# Patient Record
Sex: MALE | Race: BLACK OR AFRICAN AMERICAN | NOT HISPANIC OR LATINO
[De-identification: names, ages, dates, MRNs, and addresses within clinical notes are randomized per-mention and may not be internally consistent; named-entity substitution may affect disease eponyms.]

---

## 2021-08-16 DIAGNOSIS — Z01.818 ENCOUNTER FOR OTHER PREPROCEDURAL EXAMINATION: ICD-10-CM

## 2021-08-16 PROBLEM — Z00.00 ENCOUNTER FOR PREVENTIVE HEALTH EXAMINATION: Status: ACTIVE | Noted: 2021-08-16

## 2021-08-17 ENCOUNTER — APPOINTMENT (OUTPATIENT)
Dept: DISASTER EMERGENCY | Facility: CLINIC | Age: 66
End: 2021-08-17

## 2021-08-18 ENCOUNTER — NON-APPOINTMENT (OUTPATIENT)
Age: 66
End: 2021-08-18

## 2021-08-18 LAB — SARS-COV-2 N GENE NPH QL NAA+PROBE: NOT DETECTED

## 2021-08-20 ENCOUNTER — INPATIENT (INPATIENT)
Facility: HOSPITAL | Age: 66
LOS: 16 days | Discharge: HOME HEALTH | DRG: 233 | End: 2021-09-06
Attending: THORACIC SURGERY (CARDIOTHORACIC VASCULAR SURGERY) | Admitting: THORACIC SURGERY (CARDIOTHORACIC VASCULAR SURGERY)
Payer: MEDICARE

## 2021-08-20 VITALS
DIASTOLIC BLOOD PRESSURE: 65 MMHG | HEART RATE: 70 BPM | RESPIRATION RATE: 18 BRPM | HEIGHT: 66 IN | WEIGHT: 190.04 LBS | OXYGEN SATURATION: 100 % | TEMPERATURE: 99 F | SYSTOLIC BLOOD PRESSURE: 144 MMHG

## 2021-08-20 DIAGNOSIS — Z98.890 OTHER SPECIFIED POSTPROCEDURAL STATES: Chronic | ICD-10-CM

## 2021-08-20 DIAGNOSIS — I25.10 ATHEROSCLEROTIC HEART DISEASE OF NATIVE CORONARY ARTERY WITHOUT ANGINA PECTORIS: ICD-10-CM

## 2021-08-20 DIAGNOSIS — E78.5 HYPERLIPIDEMIA, UNSPECIFIED: ICD-10-CM

## 2021-08-20 DIAGNOSIS — E11.9 TYPE 2 DIABETES MELLITUS WITHOUT COMPLICATIONS: ICD-10-CM

## 2021-08-20 DIAGNOSIS — I10 ESSENTIAL (PRIMARY) HYPERTENSION: ICD-10-CM

## 2021-08-20 DIAGNOSIS — I25.118 ATHEROSCLEROTIC HEART DISEASE OF NATIVE CORONARY ARTERY WITH OTHER FORMS OF ANGINA PECTORIS: ICD-10-CM

## 2021-08-20 LAB
A1C WITH ESTIMATED AVERAGE GLUCOSE RESULT: 7.2 % — HIGH (ref 4–5.6)
ALBUMIN SERPL ELPH-MCNC: 3.7 G/DL — SIGNIFICANT CHANGE UP (ref 3.3–5)
ALP SERPL-CCNC: 60 U/L — SIGNIFICANT CHANGE UP (ref 40–120)
ALT FLD-CCNC: 11 U/L — SIGNIFICANT CHANGE UP (ref 10–45)
ANION GAP SERPL CALC-SCNC: 12 MMOL/L — SIGNIFICANT CHANGE UP (ref 5–17)
APPEARANCE UR: CLEAR — SIGNIFICANT CHANGE UP
APTT BLD: 30.8 SEC — SIGNIFICANT CHANGE UP (ref 27.5–35.5)
AST SERPL-CCNC: 15 U/L — SIGNIFICANT CHANGE UP (ref 10–40)
BILIRUB DIRECT SERPL-MCNC: 0.2 MG/DL — SIGNIFICANT CHANGE UP (ref 0–0.2)
BILIRUB INDIRECT FLD-MCNC: 0.4 MG/DL — SIGNIFICANT CHANGE UP (ref 0.2–1)
BILIRUB SERPL-MCNC: 0.6 MG/DL — SIGNIFICANT CHANGE UP (ref 0.2–1.2)
BILIRUB UR-MCNC: NEGATIVE — SIGNIFICANT CHANGE UP
BLD GP AB SCN SERPL QL: NEGATIVE — SIGNIFICANT CHANGE UP
BUN SERPL-MCNC: 25 MG/DL — HIGH (ref 7–23)
CALCIUM SERPL-MCNC: 9.6 MG/DL — SIGNIFICANT CHANGE UP (ref 8.4–10.5)
CHLORIDE SERPL-SCNC: 106 MMOL/L — SIGNIFICANT CHANGE UP (ref 96–108)
CHOLEST SERPL-MCNC: 136 MG/DL — SIGNIFICANT CHANGE UP
CO2 SERPL-SCNC: 21 MMOL/L — LOW (ref 22–31)
COLOR SPEC: SIGNIFICANT CHANGE UP
CREAT SERPL-MCNC: 1.06 MG/DL — SIGNIFICANT CHANGE UP (ref 0.5–1.3)
DIFF PNL FLD: NEGATIVE — SIGNIFICANT CHANGE UP
ESTIMATED AVERAGE GLUCOSE: 160 MG/DL — HIGH (ref 68–114)
FIBRINOGEN PPP-MCNC: 398 MG/DL — SIGNIFICANT CHANGE UP (ref 290–520)
GLUCOSE BLDC GLUCOMTR-MCNC: 155 MG/DL — HIGH (ref 70–99)
GLUCOSE BLDC GLUCOMTR-MCNC: 172 MG/DL — HIGH (ref 70–99)
GLUCOSE BLDC GLUCOMTR-MCNC: 177 MG/DL — HIGH (ref 70–99)
GLUCOSE BLDC GLUCOMTR-MCNC: 257 MG/DL — HIGH (ref 70–99)
GLUCOSE SERPL-MCNC: 168 MG/DL — HIGH (ref 70–99)
GLUCOSE UR QL: ABNORMAL
HCT VFR BLD CALC: 39.7 % — SIGNIFICANT CHANGE UP (ref 39–50)
HDLC SERPL-MCNC: 35 MG/DL — LOW
HGB BLD-MCNC: 13.1 G/DL — SIGNIFICANT CHANGE UP (ref 13–17)
INR BLD: 1.27 RATIO — HIGH (ref 0.88–1.16)
KETONES UR-MCNC: NEGATIVE — SIGNIFICANT CHANGE UP
LEUKOCYTE ESTERASE UR-ACNC: NEGATIVE — SIGNIFICANT CHANGE UP
LIPID PNL WITH DIRECT LDL SERPL: 91 MG/DL — SIGNIFICANT CHANGE UP
MCHC RBC-ENTMCNC: 32 PG — SIGNIFICANT CHANGE UP (ref 27–34)
MCHC RBC-ENTMCNC: 33 GM/DL — SIGNIFICANT CHANGE UP (ref 32–36)
MCV RBC AUTO: 97.1 FL — SIGNIFICANT CHANGE UP (ref 80–100)
NITRITE UR-MCNC: NEGATIVE — SIGNIFICANT CHANGE UP
NON HDL CHOLESTEROL: 101 MG/DL — SIGNIFICANT CHANGE UP
NRBC # BLD: 0 /100 WBCS — SIGNIFICANT CHANGE UP (ref 0–0)
NT-PROBNP SERPL-SCNC: 175 PG/ML — SIGNIFICANT CHANGE UP (ref 0–300)
PH UR: 6 — SIGNIFICANT CHANGE UP (ref 5–8)
PLATELET # BLD AUTO: 275 K/UL — SIGNIFICANT CHANGE UP (ref 150–400)
POTASSIUM SERPL-MCNC: 4.5 MMOL/L — SIGNIFICANT CHANGE UP (ref 3.5–5.3)
POTASSIUM SERPL-SCNC: 4.5 MMOL/L — SIGNIFICANT CHANGE UP (ref 3.5–5.3)
PROT SERPL-MCNC: 6.4 G/DL — SIGNIFICANT CHANGE UP (ref 6–8.3)
PROT UR-MCNC: NEGATIVE — SIGNIFICANT CHANGE UP
PROTHROM AB SERPL-ACNC: 15.1 SEC — HIGH (ref 10.6–13.6)
RBC # BLD: 4.09 M/UL — LOW (ref 4.2–5.8)
RBC # FLD: 12.5 % — SIGNIFICANT CHANGE UP (ref 10.3–14.5)
RH IG SCN BLD-IMP: POSITIVE — SIGNIFICANT CHANGE UP
SARS-COV-2 RNA SPEC QL NAA+PROBE: SIGNIFICANT CHANGE UP
SODIUM SERPL-SCNC: 139 MMOL/L — SIGNIFICANT CHANGE UP (ref 135–145)
SP GR SPEC: 1.04 — HIGH (ref 1.01–1.02)
T3 SERPL-MCNC: 71 NG/DL — LOW (ref 80–200)
T4 AB SER-ACNC: 5.9 UG/DL — SIGNIFICANT CHANGE UP (ref 4.6–12)
TRIGL SERPL-MCNC: 50 MG/DL — SIGNIFICANT CHANGE UP
TSH SERPL-MCNC: 1.09 UIU/ML — SIGNIFICANT CHANGE UP (ref 0.27–4.2)
UROBILINOGEN FLD QL: NEGATIVE — SIGNIFICANT CHANGE UP
WBC # BLD: 4.5 K/UL — SIGNIFICANT CHANGE UP (ref 3.8–10.5)
WBC # FLD AUTO: 4.5 K/UL — SIGNIFICANT CHANGE UP (ref 3.8–10.5)

## 2021-08-20 PROCEDURE — 71045 X-RAY EXAM CHEST 1 VIEW: CPT | Mod: 26

## 2021-08-20 PROCEDURE — 93306 TTE W/DOPPLER COMPLETE: CPT | Mod: 26

## 2021-08-20 PROCEDURE — 93010 ELECTROCARDIOGRAM REPORT: CPT

## 2021-08-20 RX ORDER — GABAPENTIN 400 MG/1
300 CAPSULE ORAL THREE TIMES A DAY
Refills: 0 | Status: DISCONTINUED | OUTPATIENT
Start: 2021-08-20 | End: 2021-08-27

## 2021-08-20 RX ORDER — FINASTERIDE 5 MG/1
5 TABLET, FILM COATED ORAL DAILY
Refills: 0 | Status: DISCONTINUED | OUTPATIENT
Start: 2021-08-20 | End: 2021-08-27

## 2021-08-20 RX ORDER — SODIUM CHLORIDE 9 MG/ML
1000 INJECTION, SOLUTION INTRAVENOUS
Refills: 0 | Status: DISCONTINUED | OUTPATIENT
Start: 2021-08-20 | End: 2021-08-23

## 2021-08-20 RX ORDER — DEXTROSE 50 % IN WATER 50 %
15 SYRINGE (ML) INTRAVENOUS ONCE
Refills: 0 | Status: DISCONTINUED | OUTPATIENT
Start: 2021-08-20 | End: 2021-08-27

## 2021-08-20 RX ORDER — GEMFIBROZIL 600 MG
600 TABLET ORAL
Refills: 0 | Status: DISCONTINUED | OUTPATIENT
Start: 2021-08-20 | End: 2021-08-27

## 2021-08-20 RX ORDER — GLUCAGON INJECTION, SOLUTION 0.5 MG/.1ML
1 INJECTION, SOLUTION SUBCUTANEOUS ONCE
Refills: 0 | Status: DISCONTINUED | OUTPATIENT
Start: 2021-08-20 | End: 2021-08-23

## 2021-08-20 RX ORDER — DEXTROSE 50 % IN WATER 50 %
12.5 SYRINGE (ML) INTRAVENOUS ONCE
Refills: 0 | Status: DISCONTINUED | OUTPATIENT
Start: 2021-08-20 | End: 2021-08-23

## 2021-08-20 RX ORDER — TAMSULOSIN HYDROCHLORIDE 0.4 MG/1
0.4 CAPSULE ORAL AT BEDTIME
Refills: 0 | Status: DISCONTINUED | OUTPATIENT
Start: 2021-08-20 | End: 2021-08-27

## 2021-08-20 RX ORDER — ASPIRIN/CALCIUM CARB/MAGNESIUM 324 MG
81 TABLET ORAL DAILY
Refills: 0 | Status: DISCONTINUED | OUTPATIENT
Start: 2021-08-20 | End: 2021-08-27

## 2021-08-20 RX ORDER — INSULIN LISPRO 100/ML
VIAL (ML) SUBCUTANEOUS
Refills: 0 | Status: DISCONTINUED | OUTPATIENT
Start: 2021-08-20 | End: 2021-08-22

## 2021-08-20 RX ORDER — INSULIN LISPRO 100/ML
VIAL (ML) SUBCUTANEOUS AT BEDTIME
Refills: 0 | Status: DISCONTINUED | OUTPATIENT
Start: 2021-08-20 | End: 2021-08-22

## 2021-08-20 RX ORDER — DEXTROSE 50 % IN WATER 50 %
25 SYRINGE (ML) INTRAVENOUS ONCE
Refills: 0 | Status: DISCONTINUED | OUTPATIENT
Start: 2021-08-20 | End: 2021-08-23

## 2021-08-20 RX ORDER — DEXTROSE 50 % IN WATER 50 %
25 SYRINGE (ML) INTRAVENOUS ONCE
Refills: 0 | Status: DISCONTINUED | OUTPATIENT
Start: 2021-08-20 | End: 2021-08-22

## 2021-08-20 RX ORDER — METOPROLOL TARTRATE 50 MG
25 TABLET ORAL
Refills: 0 | Status: DISCONTINUED | OUTPATIENT
Start: 2021-08-20 | End: 2021-08-27

## 2021-08-20 RX ADMIN — Medication 1: at 17:10

## 2021-08-20 RX ADMIN — Medication 600 MILLIGRAM(S): at 22:08

## 2021-08-20 RX ADMIN — TAMSULOSIN HYDROCHLORIDE 0.4 MILLIGRAM(S): 0.4 CAPSULE ORAL at 22:08

## 2021-08-20 RX ADMIN — FINASTERIDE 5 MILLIGRAM(S): 5 TABLET, FILM COATED ORAL at 17:13

## 2021-08-20 RX ADMIN — Medication 25 MILLIGRAM(S): at 17:13

## 2021-08-20 RX ADMIN — GABAPENTIN 300 MILLIGRAM(S): 400 CAPSULE ORAL at 22:08

## 2021-08-20 NOTE — H&P CARDIOLOGY - NSICDXFAMILYHX_GEN_ALL_CORE_FT
FAMILY HISTORY:  Father  Still living? No  MI (myocardial infarction), Age at diagnosis: Age Unknown

## 2021-08-20 NOTE — CONSULT NOTE ADULT - SUBJECTIVE AND OBJECTIVE BOX
History of Present Illness:    66 yo Male (denies implanted devices) with significant PMHx: of T2DM (last HgbA1c 8.1, managed by PCP, complicated by peripheral neuropathy), HLD, HTN, BPH presents for RHC/LHC. Patient c/o chest discomfort with exertion that has been becoming progressively worse and resolves with rest associated with SOB. Chest pain resolves with rest. Patient recently went to [facility] ER on 7/29 and patient states results were normal. Patient seen and evaluated by Dr. Celestin and presents for further evaluation and cardiac Cath.  Cath finding revealed: proximal left main 50 %, proximal LAD 95 % stenosis, proximal circumflex: 95 % and proximal RCA: 95 % stenosis. CTS consult called to evaluate patient for cardiac surgery with Dr. Deep Valerio.     Past Medical History  T2DM (type 2 diabetes mellitus)  c/b peripheral neuropathy    HTN (hypertension)    HLD (hyperlipidemia)    BPH (benign prostatic hyperplasia)    Past Surgical History  H/O shoulder surgery  right    H/O arthroscopy of knee    S/P arthroscopy of right knee    MEDICATIONS  (STANDING):  aspirin enteric coated 81 milliGRAM(s) Oral daily  dextrose 40% Gel 15 Gram(s) Oral once  dextrose 5%. 1000 milliLiter(s) (100 mL/Hr) IV Continuous <Continuous>  dextrose 5%. 1000 milliLiter(s) (50 mL/Hr) IV Continuous <Continuous>  dextrose 50% Injectable 25 Gram(s) IV Push once  dextrose 50% Injectable 25 Gram(s) IV Push once  dextrose 50% Injectable 12.5 Gram(s) IV Push once  enalapril 10 milliGRAM(s) Oral daily  finasteride 5 milliGRAM(s) Oral daily  gabapentin 300 milliGRAM(s) Oral three times a day  gemfibrozil 600 milliGRAM(s) Oral two times a day  glucagon  Injectable 1 milliGRAM(s) IntraMuscular once  insulin lispro (ADMELOG) corrective regimen sliding scale   SubCutaneous at bedtime  insulin lispro (ADMELOG) corrective regimen sliding scale   SubCutaneous three times a day before meals  tamsulosin 0.4 milliGRAM(s) Oral at bedtime    MEDICATIONS  (PRN):    Antiplatelet therapy: N/A                          Last dose/amt:    Allergies: sulfa drugs (Hives)    SOCIAL HISTORY:  Smoker: [ ] Yes  [ ] No        PACK YEARS:                         WHEN QUIT?  ETOH use: [ ] Yes  [ ] No              FREQUENCY / QUANTITY:  Ilicit Drug use:  [ ] Yes  [ ] No  Occupation:  Live with:  Assist device use:    Relevant Family History  FAMILY HISTORY:  MI (myocardial infarction) (Father)    Review of Systems  GENERAL:  Fevers[] chills[] sweats[] fatigue[] weight loss[] weight gain []                                        NEURO:  parathesias[] seizures []  syncope []  confusion []                                                                                  EYES: glasses[]  blurry vision[]  discharge[] pain[] glaucoma []                                                                            ENMT:  difficulty hearing []  vertigo[]  dysphagia[] epistaxis[] recent dental work []                                      CV:  chest pain[] palpitations[] HOLLEY [] diaphoresis [] edema[]                                                                                             RESPIRATORY:  wheezing[] SOB[] cough [] sputum[] hemoptysis[]                                                                    GI:  nausea[]  vomiting []  diarrhea[] constipation [] melena []                                                                        : hematuria[ ]  dysuria[ ] urgency[] incontinence[]                                                                                              MUSCULOSKELETAL  arthritis[ ]  joint swelling [ ] muscle weakness [ ]                                                                  SKIN/BREAST:  rash[ ] itching [ ]  hair loss[ ] masses[ ]                                                                                                PSYCH:  dementia [ ] depression [ ] anxiety[ ]                                                                                                                  HEME/LYMPH:  bruises easily[ ] enlarged lymph nodes[ ] tender lymph nodes[ ]                                                 ENDOCRINE:  cold intolerance[ ] heat intolerance[ ] polydipsia[ ]                                                                              PHYSICAL EXAM  Vital Signs Last 24 Hrs  T(C): 37.1 (20 Aug 2021 08:07), Max: 37.1 (20 Aug 2021 08:07)  T(F): 98.7 (20 Aug 2021 08:07), Max: 98.7 (20 Aug 2021 08:07)  HR: 68 (20 Aug 2021 12:40) (60 - 70)  BP: 113/63 (20 Aug 2021 12:40) (104/58 - 144/65)  BP(mean): --  RR: 19 (20 Aug 2021 12:40) (14 - 19)  SpO2: 98% (20 Aug 2021 12:40) (97% - 100%)    General: Well nourished, well developed, no acute distress.                                                         Neuro: Normal exam oriented to person/place & time with no focal motor or sensory  deficits.                    Eyes: Normal exam of conjunctiva & lids, pupils equally reactive.   ENT: Normal exam of nasal/oral mucosa with absence of cyanosis.   Neck: Normal exam of jugular veins, trachea & thyroid.   Chest: Normal lung exam with good air movement absence of wheezes, rales, or rhonchi:                                                                          CV:  Auscultation: normal [ ] S3[ ] S4[ ] Irregular [ ] Rub[ ] Clicks[ ]  Murmurs none:[ ]systolic [ ]  diastolic [ ] holosystolic [ ]  Carotids: No Bruits[ ] Other____________ Abdominal Aorta: normal [ ] nonpalpable[ ]                                                                         GI: Normal exam of abdomen, liver & spleen with no noted masses or tenderness.  (+) BS X 4 Quadrants, Nontender / Non Distended.                                                                                             Extremities: Normal no evidence of cyanosis or deformity Edema: none[ ]trace[ ]1+[ ]2+[ ]3+[ ]4+[ ]  Lower Extremity Pulses: Right[ ] Left[ ]Varicosities[ ]  SKIN : Normal exam to inspection & palpation.                                                           LABS:                        13.1   4.50  )-----------( 275      ( 20 Aug 2021 08:50 )             39.7     08-20    139  |  106  |  25<H>  ----------------------------<  168<H>  4.5   |  21<L>  |  1.06    Ca    9.6      20 Aug 2021 08:50    TPro  6.4  /  Alb  3.7  /  TBili  0.6  /  DBili  0.2  /  AST  15  /  ALT  11  /  AlkPhos  60  08-20    PT/INR - ( 20 Aug 2021 11:21 )   PT: 15.1 sec;   INR: 1.27 ratio         PTT - ( 20 Aug 2021 11:21 )  PTT:30.8 sec    Cardiac Cath: < from: Cardiac Cath Lab (08.20.21 @ 09:50) EF estimated was 60 %.  CORONARY VESSELS: The coronary circulation is right dominant.  LM: --  Proximal left main: There was a 50 % stenosis.  LAD:   --  Proximal LAD: There was a 95 % stenosis.  CX:   --  Proximal circumflex: There was a 95 % stenosis.  RCA:   --  Proximal RCA: There was a 95 % stenosis.               History of Present Illness:    64 yo Male (denies implanted devices) with significant PMHx: of T2DM (last HgbA1c 8.1, managed by PCP, complicated by peripheral neuropathy), HLD, HTN, BPH presents for RHC/LHC. Patient c/o chest discomfort with exertion that has been becoming progressively worse and resolves with rest associated with SOB. Chest pain resolves with rest. Patient recently went to [facility] ER on 7/29 and patient states results were normal. Patient seen and evaluated by Dr. Celestin and presents for further evaluation and cardiac Cath.  Cath finding revealed: proximal left main 50 %, proximal LAD 95 % stenosis, proximal circumflex: 95 % and proximal RCA: 95 % stenosis. CTS consult called to evaluate patient for cardiac surgery with Dr. Deep Valerio.     Past Medical History    T2DM (type 2 diabetes mellitus)  c/b peripheral neuropathy    HTN (hypertension)    HLD (hyperlipidemia)    BPH (benign prostatic hyperplasia)    Past Surgical History  H/O shoulder surgery  right    H/O arthroscopy of knee    S/P arthroscopy of right knee    MEDICATIONS  (STANDING):  aspirin enteric coated 81 milliGRAM(s) Oral daily  dextrose 40% Gel 15 Gram(s) Oral once  dextrose 5%. 1000 milliLiter(s) (100 mL/Hr) IV Continuous <Continuous>  dextrose 5%. 1000 milliLiter(s) (50 mL/Hr) IV Continuous <Continuous>  dextrose 50% Injectable 25 Gram(s) IV Push once  dextrose 50% Injectable 25 Gram(s) IV Push once  dextrose 50% Injectable 12.5 Gram(s) IV Push once  enalapril 10 milliGRAM(s) Oral daily  finasteride 5 milliGRAM(s) Oral daily  gabapentin 300 milliGRAM(s) Oral three times a day  gemfibrozil 600 milliGRAM(s) Oral two times a day  glucagon  Injectable 1 milliGRAM(s) IntraMuscular once  insulin lispro (ADMELOG) corrective regimen sliding scale  SubCutaneous at bedtime  insulin lispro (ADMELOG) corrective regimen sliding scale  SubCutaneous three times a day before meals  tamsulosin 0.4 milliGRAM(s) Oral at bedtime    MEDICATIONS  (PRN):    Antiplatelet therapy: N/A                          Last dose/amt:    Allergies: sulfa drugs (Hives)    SOCIAL HISTORY:  Smoker: [ ] Yes  [X ] No        PACK YEARS:                         WHEN QUIT?  ETOH use: [ ] Yes  [X] No              FREQUENCY / QUANTITY: Occasional may have a drink every 6 months   Ilicit Drug use:  [ ] Yes  [X ] No  Occupation: Retired Social work   Live with: Spouse   Assist device use: Denies     Relevant Family History  FAMILY HISTORY:  MI (myocardial infarction) (Father)    Review of Systems  GENERAL:  Fevers[] chills[] sweats[] fatigue[] weight loss[] weight gain []                                        NEURO:  parathesias[] seizures []  syncope []  confusion []                                                                                  EYES: glasses[]  blurry vision[]  discharge[] pain[] glaucoma []                                                                            ENMT:  difficulty hearing []  vertigo[]  dysphagia[] epistaxis[] recent dental work []                                      CV:  chest pain[X] palpitations[] HOLLEY [X] diaphoresis [] edema[]                                                                                             RESPIRATORY:  wheezing[] SOB[] cough [] sputum[] hemoptysis[]                                                                    GI:  nausea[]  vomiting []  diarrhea[] constipation [] melena []                                                                        : hematuria[ ]  dysuria[ ] urgency[] incontinence[]                                                                                              MUSCULOSKELETAL  arthritis[ ]  joint swelling [ ] muscle weakness [ ]                                                                  SKIN/BREAST:  rash[ ] itching [ ]  hair loss[ ] masses[ ]                                                                                                PSYCH:  dementia [ ] depression [ ] anxiety[ ]                                                                                                                  HEME/LYMPH:  bruises easily[ ] enlarged lymph nodes[ ] tender lymph nodes[ ]                                                 ENDOCRINE:  cold intolerance[ ] heat intolerance[ ] polydipsia[ ]                                                                              PHYSICAL EXAM  Vital Signs Last 24 Hrs  T(C): 37.1 (20 Aug 2021 08:07), Max: 37.1 (20 Aug 2021 08:07)  T(F): 98.7 (20 Aug 2021 08:07), Max: 98.7 (20 Aug 2021 08:07)  HR: 68 (20 Aug 2021 12:40) (60 - 70)  BP: 113/63 (20 Aug 2021 12:40) (104/58 - 144/65)  BP(mean): --  RR: 19 (20 Aug 2021 12:40) (14 - 19)  SpO2: 98% (20 Aug 2021 12:40) (97% - 100%)    General: Well nourished, well developed, no acute distress.                                                         Neuro: Normal exam oriented to person/place & time with no focal motor or sensory  deficits.                    Eyes: Normal exam of conjunctiva & lids, pupils equally reactive.   ENT: Normal exam of nasal/oral mucosa with absence of cyanosis.   Neck: Normal exam of jugular veins, trachea & thyroid.   Chest: Normal lung exam with good air movement absence of wheezes, rales, or rhonchi:                                                                          CV:  Auscultation: normal [X] S3[ ] S4[ ] Irregular [ ] Rub[ ] Clicks[ ]  Murmurs none:[X ]systolic [ ]  diastolic [ ] holosystolic [ ]  Carotids: No Bruits[+2 ] Other____________ Abdominal Aorta: normal [X ] nonpalpable[X ]                                                                         GI: Normal exam of abdomen, liver & spleen with no noted masses or tenderness.  (+) BS X 4 Quadrants, Nontender / Non Distended.                                                                                             Extremities: Normal no evidence of cyanosis or deformity Edema: none[X ]trace[ ]1+[ ]2+[ ]3+[ ]4+[ ]  Lower Extremity Pulses: Right[+2] Left[+2 ]Varicosities[+ ]  SKIN : Normal exam to inspection & palpation.                                                           LABS:                        13.1   4.50  )-----------( 275      ( 20 Aug 2021 08:50 )             39.7     08-20    139  |  106  |  25<H>  ----------------------------<  168<H>  4.5   |  21<L>  |  1.06    Ca    9.6      20 Aug 2021 08:50    TPro  6.4  /  Alb  3.7  /  TBili  0.6  /  DBili  0.2  /  AST  15  /  ALT  11  /  AlkPhos  60  08-20    PT/INR - ( 20 Aug 2021 11:21 )   PT: 15.1 sec;   INR: 1.27 ratio         PTT - ( 20 Aug 2021 11:21 )  PTT:30.8 sec    Cardiac Cath: < from: Cardiac Cath Lab (08.20.21 @ 09:50) EF estimated was 60 %.  CORONARY VESSELS: The coronary circulation is right dominant.  LM: --  Proximal left main: There was a 50 % stenosis.  LAD:   --  Proximal LAD: There was a 95 % stenosis.  CX:   --  Proximal circumflex: There was a 95 % stenosis.  RCA:   --  Proximal RCA: There was a 95 % stenosis.

## 2021-08-20 NOTE — CONSULT NOTE ADULT - ASSESSMENT
66 yo Male (denies implanted devices) with significant PMHx: of T2DM (last HgbA1c 8.1, managed by PCP, complicated by peripheral neuropathy), HLD, HTN, BPH presents for RHC/LHC today. S/P Cath finding revealed: proximal left main 50 %, proximal LAD 95 % stenosis, proximal circumflex: 95 % and proximal RCA: 95 % stenosis. CTS consult called to evaluate patient for cardiac surgery with Dr. Deep Valerio.     8/20 Pre op Cardiac surgery work up initiated.  66 yo Male (denies implanted devices) with significant PMHx: of T2DM (last HgbA1c 8.1, managed by PCP, complicated by peripheral neuropathy), HLD, HTN, BPH presents for RHC/LHC today. S/P Cath finding revealed: proximal left main 50 %, proximal LAD 95 % stenosis, proximal circumflex: 95 % and proximal RCA: 95 % stenosis. CTS consult called to evaluate patient for cardiac surgery with Dr. Deep Valerio.     8/20 Pre op Cardiac surgery work up initiated. Patient at this time would like to explore other options for treatment.  Will still be amenable to speak to Dr. Valerio regarding surgical options. Will proceed with pre op cardiac work up once patient is agreeable to bypass surgery.   66 yo Male (denies implanted devices) with significant PMHx: of T2DM (last HgbA1c 8.1, managed by PCP, complicated by peripheral neuropathy), HLD, HTN, BPH presents for RHC/LHC today. S/P Cath finding revealed: proximal left main 50 %, proximal LAD 95 % stenosis, proximal circumflex: 95 % and proximal RCA: 95 % stenosis. CTS consult called to evaluate patient for cardiac surgery with Dr. Deep Valerio.     8/20 Pre op Cardiac surgery work up initiated. Plan for cardiac surgery with Dr. Valerio tentatively for Monday 8/23/21

## 2021-08-20 NOTE — H&P CARDIOLOGY - NSICDXPASTSURGICALHX_GEN_ALL_CORE_FT
PAST SURGICAL HISTORY:  H/O arthroscopy of knee     H/O shoulder surgery right    S/P arthroscopy of right knee

## 2021-08-20 NOTE — H&P CARDIOLOGY - HISTORY OF PRESENT ILLNESS
65 year old male with significant PMHx of T2DM, HTN presents for RHC/LHC. Patient seen and evaluated by Dr. Celestin and presents for further evaluation.  65 year old male with significant PMHx of T2DM (last HgbA1c 8.1), HLD, HTN presents for RHC/LHC. Patient seen and evaluated by Dr. Celestin and presents for further evaluation.  65 year old male (denies implanted devices) with significant PMHx of T2DM (last HgbA1c 8.1, managed by PCP, complicated by peripheral neuropathy), HLD, HTN, BPH presents for RHC/LHC. Patient c/o chest discomfort with exertion that has been becoming progressively worse and resolves with rest associated with SOB. Chest pain resolves with rest. Patient seen and evaluated by Dr. Celestin and presents for further evaluation.     Patient currenly denies chest pian, palpitations, orthopnea or PND.  65 year old male (denies implanted devices) with significant PMHx of T2DM (last HgbA1c 8.1, managed by PCP, complicated by peripheral neuropathy), HLD, HTN, BPH presents for RHC/LHC. Patient c/o chest discomfort with exertion that has been becoming progressively worse and resolves with rest associated with SOB. Chest pain resolves with rest. Patient recently went to [facility] ER on 7/29 and patient states results were normal. Patient seen and evaluated by Dr. Celestin and presents for further evaluation.     Patient currenly denies chest pian, palpitations, orthopnea or PND.

## 2021-08-20 NOTE — H&P CARDIOLOGY - NSICDXPASTMEDICALHX_GEN_ALL_CORE_FT
PAST MEDICAL HISTORY:  BPH (benign prostatic hyperplasia)     HLD (hyperlipidemia)     HTN (hypertension)     T2DM (type 2 diabetes mellitus) c/b peripheral neuropathy

## 2021-08-21 LAB
A1C WITH ESTIMATED AVERAGE GLUCOSE RESULT: 7.1 % — HIGH (ref 4–5.6)
COVID-19 SPIKE DOMAIN AB INTERP: POSITIVE
COVID-19 SPIKE DOMAIN AB INTERP: POSITIVE
COVID-19 SPIKE DOMAIN ANTIBODY RESULT: >250 U/ML — HIGH
COVID-19 SPIKE DOMAIN ANTIBODY RESULT: >250 U/ML — HIGH
ESTIMATED AVERAGE GLUCOSE: 157 MG/DL — HIGH (ref 68–114)
GLUCOSE BLDC GLUCOMTR-MCNC: 121 MG/DL — HIGH (ref 70–99)
GLUCOSE BLDC GLUCOMTR-MCNC: 198 MG/DL — HIGH (ref 70–99)
GLUCOSE BLDC GLUCOMTR-MCNC: 224 MG/DL — HIGH (ref 70–99)
GLUCOSE BLDC GLUCOMTR-MCNC: 256 MG/DL — HIGH (ref 70–99)
PA ADP PRP-ACNC: 64 PRU — LOW (ref 194–417)
SARS-COV-2 IGG+IGM SERPL QL IA: >250 U/ML — HIGH
SARS-COV-2 IGG+IGM SERPL QL IA: >250 U/ML — HIGH
SARS-COV-2 IGG+IGM SERPL QL IA: POSITIVE
SARS-COV-2 IGG+IGM SERPL QL IA: POSITIVE
T3 SERPL-MCNC: 75 NG/DL — LOW (ref 80–200)
T4 FREE SERPL-MCNC: 1.3 NG/DL — SIGNIFICANT CHANGE UP (ref 0.9–1.8)
TSH SERPL-MCNC: 0.79 UIU/ML — SIGNIFICANT CHANGE UP (ref 0.27–4.2)

## 2021-08-21 PROCEDURE — 93880 EXTRACRANIAL BILAT STUDY: CPT | Mod: 26

## 2021-08-21 PROCEDURE — 99232 SBSQ HOSP IP/OBS MODERATE 35: CPT

## 2021-08-21 RX ORDER — DEXTROSE 50 % IN WATER 50 %
25 SYRINGE (ML) INTRAVENOUS ONCE
Refills: 0 | Status: DISCONTINUED | OUTPATIENT
Start: 2021-08-21 | End: 2021-08-27

## 2021-08-21 RX ORDER — SODIUM CHLORIDE 9 MG/ML
1000 INJECTION, SOLUTION INTRAVENOUS
Refills: 0 | Status: DISCONTINUED | OUTPATIENT
Start: 2021-08-21 | End: 2021-08-23

## 2021-08-21 RX ORDER — GLUCAGON INJECTION, SOLUTION 0.5 MG/.1ML
1 INJECTION, SOLUTION SUBCUTANEOUS ONCE
Refills: 0 | Status: DISCONTINUED | OUTPATIENT
Start: 2021-08-21 | End: 2021-08-23

## 2021-08-21 RX ORDER — INSULIN GLARGINE 100 [IU]/ML
8 INJECTION, SOLUTION SUBCUTANEOUS AT BEDTIME
Refills: 0 | Status: DISCONTINUED | OUTPATIENT
Start: 2021-08-21 | End: 2021-08-21

## 2021-08-21 RX ORDER — INSULIN LISPRO 100/ML
5 VIAL (ML) SUBCUTANEOUS
Refills: 0 | Status: DISCONTINUED | OUTPATIENT
Start: 2021-08-21 | End: 2021-08-22

## 2021-08-21 RX ORDER — INSULIN GLARGINE 100 [IU]/ML
14 INJECTION, SOLUTION SUBCUTANEOUS AT BEDTIME
Refills: 0 | Status: DISCONTINUED | OUTPATIENT
Start: 2021-08-21 | End: 2021-08-22

## 2021-08-21 RX ORDER — ENOXAPARIN SODIUM 100 MG/ML
85 INJECTION SUBCUTANEOUS EVERY 12 HOURS
Refills: 0 | Status: DISCONTINUED | OUTPATIENT
Start: 2021-08-21 | End: 2021-08-21

## 2021-08-21 RX ORDER — ENOXAPARIN SODIUM 100 MG/ML
90 INJECTION SUBCUTANEOUS EVERY 12 HOURS
Refills: 0 | Status: DISCONTINUED | OUTPATIENT
Start: 2021-08-21 | End: 2021-08-22

## 2021-08-21 RX ORDER — DEXTROSE 50 % IN WATER 50 %
15 SYRINGE (ML) INTRAVENOUS ONCE
Refills: 0 | Status: DISCONTINUED | OUTPATIENT
Start: 2021-08-21 | End: 2021-08-23

## 2021-08-21 RX ADMIN — Medication 600 MILLIGRAM(S): at 06:21

## 2021-08-21 RX ADMIN — Medication 1: at 22:30

## 2021-08-21 RX ADMIN — Medication 5 UNIT(S): at 17:28

## 2021-08-21 RX ADMIN — GABAPENTIN 300 MILLIGRAM(S): 400 CAPSULE ORAL at 13:25

## 2021-08-21 RX ADMIN — INSULIN GLARGINE 14 UNIT(S): 100 INJECTION, SOLUTION SUBCUTANEOUS at 22:30

## 2021-08-21 RX ADMIN — GABAPENTIN 300 MILLIGRAM(S): 400 CAPSULE ORAL at 06:21

## 2021-08-21 RX ADMIN — ENOXAPARIN SODIUM 90 MILLIGRAM(S): 100 INJECTION SUBCUTANEOUS at 17:28

## 2021-08-21 RX ADMIN — Medication 81 MILLIGRAM(S): at 12:11

## 2021-08-21 RX ADMIN — TAMSULOSIN HYDROCHLORIDE 0.4 MILLIGRAM(S): 0.4 CAPSULE ORAL at 22:55

## 2021-08-21 RX ADMIN — Medication 25 MILLIGRAM(S): at 06:20

## 2021-08-21 RX ADMIN — Medication 600 MILLIGRAM(S): at 17:27

## 2021-08-21 RX ADMIN — Medication 25 MILLIGRAM(S): at 17:28

## 2021-08-21 RX ADMIN — GABAPENTIN 300 MILLIGRAM(S): 400 CAPSULE ORAL at 22:31

## 2021-08-21 RX ADMIN — Medication 2: at 12:12

## 2021-08-21 RX ADMIN — FINASTERIDE 5 MILLIGRAM(S): 5 TABLET, FILM COATED ORAL at 12:12

## 2021-08-21 RX ADMIN — Medication 1: at 17:29

## 2021-08-22 LAB
ANION GAP SERPL CALC-SCNC: 15 MMOL/L — SIGNIFICANT CHANGE UP (ref 5–17)
BASOPHILS # BLD AUTO: 0.06 K/UL — SIGNIFICANT CHANGE UP (ref 0–0.2)
BASOPHILS NFR BLD AUTO: 1.6 % — SIGNIFICANT CHANGE UP (ref 0–2)
BLD GP AB SCN SERPL QL: NEGATIVE — SIGNIFICANT CHANGE UP
BUN SERPL-MCNC: 24 MG/DL — HIGH (ref 7–23)
CALCIUM SERPL-MCNC: 9.3 MG/DL — SIGNIFICANT CHANGE UP (ref 8.4–10.5)
CHLORIDE SERPL-SCNC: 107 MMOL/L — SIGNIFICANT CHANGE UP (ref 96–108)
CO2 SERPL-SCNC: 18 MMOL/L — LOW (ref 22–31)
CREAT SERPL-MCNC: 0.83 MG/DL — SIGNIFICANT CHANGE UP (ref 0.5–1.3)
EOSINOPHIL # BLD AUTO: 0.16 K/UL — SIGNIFICANT CHANGE UP (ref 0–0.5)
EOSINOPHIL NFR BLD AUTO: 4.4 % — SIGNIFICANT CHANGE UP (ref 0–6)
GLUCOSE BLDC GLUCOMTR-MCNC: 119 MG/DL — HIGH (ref 70–99)
GLUCOSE BLDC GLUCOMTR-MCNC: 188 MG/DL — HIGH (ref 70–99)
GLUCOSE BLDC GLUCOMTR-MCNC: 256 MG/DL — HIGH (ref 70–99)
GLUCOSE BLDC GLUCOMTR-MCNC: 277 MG/DL — HIGH (ref 70–99)
GLUCOSE BLDC GLUCOMTR-MCNC: 319 MG/DL — HIGH (ref 70–99)
GLUCOSE SERPL-MCNC: 108 MG/DL — HIGH (ref 70–99)
HCT VFR BLD CALC: 41.5 % — SIGNIFICANT CHANGE UP (ref 39–50)
HGB BLD-MCNC: 13.9 G/DL — SIGNIFICANT CHANGE UP (ref 13–17)
IMM GRANULOCYTES NFR BLD AUTO: 0.3 % — SIGNIFICANT CHANGE UP (ref 0–1.5)
LYMPHOCYTES # BLD AUTO: 1.09 K/UL — SIGNIFICANT CHANGE UP (ref 1–3.3)
LYMPHOCYTES # BLD AUTO: 29.7 % — SIGNIFICANT CHANGE UP (ref 13–44)
MCHC RBC-ENTMCNC: 31.4 PG — SIGNIFICANT CHANGE UP (ref 27–34)
MCHC RBC-ENTMCNC: 33.5 GM/DL — SIGNIFICANT CHANGE UP (ref 32–36)
MCV RBC AUTO: 93.9 FL — SIGNIFICANT CHANGE UP (ref 80–100)
MONOCYTES # BLD AUTO: 0.6 K/UL — SIGNIFICANT CHANGE UP (ref 0–0.9)
MONOCYTES NFR BLD AUTO: 16.3 % — HIGH (ref 2–14)
MRSA PCR RESULT.: SIGNIFICANT CHANGE UP
NEUTROPHILS # BLD AUTO: 1.75 K/UL — LOW (ref 1.8–7.4)
NEUTROPHILS NFR BLD AUTO: 47.7 % — SIGNIFICANT CHANGE UP (ref 43–77)
NRBC # BLD: 0 /100 WBCS — SIGNIFICANT CHANGE UP (ref 0–0)
PA ADP PRP-ACNC: 80 PRU — LOW (ref 194–417)
PLATELET # BLD AUTO: 265 K/UL — SIGNIFICANT CHANGE UP (ref 150–400)
POTASSIUM SERPL-MCNC: 3.8 MMOL/L — SIGNIFICANT CHANGE UP (ref 3.5–5.3)
POTASSIUM SERPL-SCNC: 3.8 MMOL/L — SIGNIFICANT CHANGE UP (ref 3.5–5.3)
RBC # BLD: 4.42 M/UL — SIGNIFICANT CHANGE UP (ref 4.2–5.8)
RBC # FLD: 12.2 % — SIGNIFICANT CHANGE UP (ref 10.3–14.5)
RH IG SCN BLD-IMP: POSITIVE — SIGNIFICANT CHANGE UP
S AUREUS DNA NOSE QL NAA+PROBE: SIGNIFICANT CHANGE UP
SARS-COV-2 RNA SPEC QL NAA+PROBE: SIGNIFICANT CHANGE UP
SARS-COV-2 RNA SPEC QL NAA+PROBE: SIGNIFICANT CHANGE UP
SODIUM SERPL-SCNC: 140 MMOL/L — SIGNIFICANT CHANGE UP (ref 135–145)
WBC # BLD: 3.67 K/UL — LOW (ref 3.8–10.5)
WBC # FLD AUTO: 3.67 K/UL — LOW (ref 3.8–10.5)

## 2021-08-22 PROCEDURE — 99232 SBSQ HOSP IP/OBS MODERATE 35: CPT

## 2021-08-22 RX ORDER — DEXTROSE 50 % IN WATER 50 %
50 SYRINGE (ML) INTRAVENOUS
Refills: 0 | Status: DISCONTINUED | OUTPATIENT
Start: 2021-08-22 | End: 2021-08-23

## 2021-08-22 RX ORDER — CHLORHEXIDINE GLUCONATE 213 G/1000ML
15 SOLUTION TOPICAL
Refills: 0 | Status: COMPLETED | OUTPATIENT
Start: 2021-08-22 | End: 2021-08-23

## 2021-08-22 RX ORDER — DEXTROSE 50 % IN WATER 50 %
25 SYRINGE (ML) INTRAVENOUS
Refills: 0 | Status: DISCONTINUED | OUTPATIENT
Start: 2021-08-22 | End: 2021-08-23

## 2021-08-22 RX ORDER — INSULIN LISPRO 100/ML
5 VIAL (ML) SUBCUTANEOUS ONCE
Refills: 0 | Status: COMPLETED | OUTPATIENT
Start: 2021-08-22 | End: 2021-08-22

## 2021-08-22 RX ORDER — CEFUROXIME AXETIL 250 MG
1500 TABLET ORAL ONCE
Refills: 0 | Status: DISCONTINUED | OUTPATIENT
Start: 2021-08-23 | End: 2021-08-27

## 2021-08-22 RX ORDER — INSULIN HUMAN 100 [IU]/ML
6 INJECTION, SOLUTION SUBCUTANEOUS ONCE
Refills: 0 | Status: COMPLETED | OUTPATIENT
Start: 2021-08-22 | End: 2021-08-22

## 2021-08-22 RX ORDER — CHLORHEXIDINE GLUCONATE 213 G/1000ML
1 SOLUTION TOPICAL
Refills: 0 | Status: COMPLETED | OUTPATIENT
Start: 2021-08-22 | End: 2021-08-23

## 2021-08-22 RX ORDER — INSULIN HUMAN 100 [IU]/ML
6 INJECTION, SOLUTION SUBCUTANEOUS
Qty: 100 | Refills: 0 | Status: DISCONTINUED | OUTPATIENT
Start: 2021-08-22 | End: 2021-08-23

## 2021-08-22 RX ADMIN — Medication 25 MILLIGRAM(S): at 06:17

## 2021-08-22 RX ADMIN — GABAPENTIN 300 MILLIGRAM(S): 400 CAPSULE ORAL at 22:06

## 2021-08-22 RX ADMIN — CHLORHEXIDINE GLUCONATE 15 MILLILITER(S): 213 SOLUTION TOPICAL at 22:04

## 2021-08-22 RX ADMIN — CHLORHEXIDINE GLUCONATE 1 APPLICATION(S): 213 SOLUTION TOPICAL at 22:03

## 2021-08-22 RX ADMIN — ENOXAPARIN SODIUM 90 MILLIGRAM(S): 100 INJECTION SUBCUTANEOUS at 17:16

## 2021-08-22 RX ADMIN — Medication 81 MILLIGRAM(S): at 12:11

## 2021-08-22 RX ADMIN — Medication 5 UNIT(S): at 08:17

## 2021-08-22 RX ADMIN — Medication 600 MILLIGRAM(S): at 06:17

## 2021-08-22 RX ADMIN — Medication 3: at 17:15

## 2021-08-22 RX ADMIN — Medication 5 UNIT(S): at 17:39

## 2021-08-22 RX ADMIN — INSULIN HUMAN 6 UNIT(S)/HR: 100 INJECTION, SOLUTION SUBCUTANEOUS at 22:30

## 2021-08-22 RX ADMIN — Medication 5 UNIT(S): at 17:15

## 2021-08-22 RX ADMIN — TAMSULOSIN HYDROCHLORIDE 0.4 MILLIGRAM(S): 0.4 CAPSULE ORAL at 22:06

## 2021-08-22 RX ADMIN — Medication 25 MILLIGRAM(S): at 17:16

## 2021-08-22 RX ADMIN — FINASTERIDE 5 MILLIGRAM(S): 5 TABLET, FILM COATED ORAL at 12:11

## 2021-08-22 RX ADMIN — Medication 600 MILLIGRAM(S): at 17:16

## 2021-08-22 RX ADMIN — GABAPENTIN 300 MILLIGRAM(S): 400 CAPSULE ORAL at 06:17

## 2021-08-22 RX ADMIN — GABAPENTIN 300 MILLIGRAM(S): 400 CAPSULE ORAL at 14:02

## 2021-08-22 RX ADMIN — Medication 1: at 12:11

## 2021-08-22 RX ADMIN — Medication 5 UNIT(S): at 12:12

## 2021-08-22 RX ADMIN — INSULIN HUMAN 6 UNIT(S): 100 INJECTION, SOLUTION SUBCUTANEOUS at 22:31

## 2021-08-22 RX ADMIN — ENOXAPARIN SODIUM 90 MILLIGRAM(S): 100 INJECTION SUBCUTANEOUS at 06:17

## 2021-08-22 NOTE — PROGRESS NOTE ADULT - ASSESSMENT
66 yo Male (denies implanted devices) with significant PMHx: of T2DM (last HgbA1c 8.1, managed by PCP, complicated by peripheral neuropathy), HLD, HTN, BPH presents for RHC/LHC. Patient c/o chest discomfort with exertion that has been becoming progressively worse and resolves with rest associated with SOB. Chest pain resolves with rest. Patient recently went to [facility] ER on 7/29 and patient states results were normal. Patient seen and evaluated by Dr. Celestin and presents for further evaluation and cardiac Cath.  Cath finding revealed: proximal left main 50 %, proximal LAD 95 % stenosis, proximal circumflex: 95 % and proximal RCA: 95 % stenosis. CTS consult called to evaluate patient for cardiac surgery with Dr. Deep Valerio.   8/22 P2Y12 80. Continue with BBlocker, statin asa. CABG to be rescheduled. 66 yo Male (denies implanted devices) with significant PMHx: of T2DM (last HgbA1c 8.1, managed by PCP, complicated by peripheral neuropathy), HLD, HTN, BPH presents for RHC/LHC. Patient c/o chest discomfort with exertion that has been becoming progressively worse and resolves with rest associated with SOB. Chest pain resolves with rest. Patient recently went to [facility] ER on 7/29 and patient states results were normal. Patient seen and evaluated by Dr. Celestin and presents for further evaluation and cardiac Cath.  Cath finding revealed: proximal left main 50 %, proximal LAD 95 % stenosis, proximal circumflex: 95 % and proximal RCA: 95 % stenosis. CTS consult called to evaluate patient for cardiac surgery with Dr. Deep Valerio.   8/22 P2Y12 80. Continue with BBlocker, statin asa. CABG tentatively scheduled in am pending repeat [id number] in am 0400

## 2021-08-22 NOTE — PROGRESS NOTE ADULT - SUBJECTIVE AND OBJECTIVE BOX
VITAL SIGNS    Telemetry: SR 55-60   Vital Signs Last 24 Hrs  T(C): 36.5 (21 @ 06:03), Max: 36.6 (21 @ 14:40)  T(F): 97.7 (21 @ 06:03), Max: 97.9 (21 @ 14:40)  HR: 64 (21 @ 06:03) (64 - 79)  BP: 125/74 (21 @ 06:03) (122/70 - 126/64)  RR: 18 (21 @ 06:03) (18 - 18)  SpO2: 95% (21 @ 06:03) (95% - 99%)             @ 07:01  -   @ 07:00  --------------------------------------------------------  IN: 1280 mL / OUT: 2300 mL / NET: -1020 mL     @ 07:01  -   @ 10:24  --------------------------------------------------------  IN: 360 mL / OUT: 400 mL / NET: -40 mL       Daily     Daily Weight in k.1 (22 Aug 2021 09:44)  Admit Wt: Drug Dosing Weight  Height (cm): 167.6 (20 Aug 2021 08:07)  Weight (kg): 86.2 (20 Aug 2021 08:07)  BMI (kg/m2): 30.7 (20 Aug 2021 08:07)  BSA (m2): 1.96 (20 Aug 2021 08:07)      CAPILLARY BLOOD GLUCOSE      POCT Blood Glucose.: 119 mg/dL (22 Aug 2021 08:00)  POCT Blood Glucose.: 256 mg/dL (21 Aug 2021 21:44)  POCT Blood Glucose.: 198 mg/dL (21 Aug 2021 17:19)  POCT Blood Glucose.: 224 mg/dL (21 Aug 2021 11:46)          MEDICATIONS  aspirin enteric coated 81 milliGRAM(s) Oral daily  dextrose 40% Gel 15 Gram(s) Oral once  dextrose 40% Gel 15 Gram(s) Oral once  dextrose 5%. 1000 milliLiter(s) IV Continuous <Continuous>  dextrose 5%. 1000 milliLiter(s) IV Continuous <Continuous>  dextrose 5%. 1000 milliLiter(s) IV Continuous <Continuous>  dextrose 50% Injectable 25 Gram(s) IV Push once  dextrose 50% Injectable 12.5 Gram(s) IV Push once  dextrose 50% Injectable 25 Gram(s) IV Push once  dextrose 50% Injectable 25 Gram(s) IV Push once  enoxaparin Injectable 90 milliGRAM(s) SubCutaneous every 12 hours  finasteride 5 milliGRAM(s) Oral daily  gabapentin 300 milliGRAM(s) Oral three times a day  gemfibrozil 600 milliGRAM(s) Oral two times a day  glucagon  Injectable 1 milliGRAM(s) IntraMuscular once  glucagon  Injectable 1 milliGRAM(s) IntraMuscular once  insulin glargine Injectable (LANTUS) 14 Unit(s) SubCutaneous at bedtime  insulin lispro (ADMELOG) corrective regimen sliding scale   SubCutaneous at bedtime  insulin lispro (ADMELOG) corrective regimen sliding scale   SubCutaneous three times a day before meals  insulin lispro Injectable (ADMELOG) 5 Unit(s) SubCutaneous three times a day before meals  metoprolol tartrate 25 milliGRAM(s) Oral two times a day  tamsulosin 0.4 milliGRAM(s) Oral at bedtime      >>> <<<  PHYSICAL EXAM  Subjective: NAD OOB to chair  Neurology: alert and oriented x 3, nonfocal, no gross deficits  CV : s1s2  Lungs: CTA b/l  Abdomen: soft, NT,ND, (+ )BM  :  voiding  Extremities:  -c/c/e     LABS  -    140  |  107  |  24<H>  ----------------------------<  108<H>  3.8   |  18<L>  |  0.83    Ca    9.3      22 Aug 2021 06:41    TPro  6.4  /  Alb  3.7  /  TBili  0.6  /  DBili  0.2  /  AST  15  /  ALT  11  /  AlkPhos  60  -                                 13.9   3.67  )-----------( 265      ( 22 Aug 2021 07:11 )             41.5          PT/INR - ( 20 Aug 2021 11:21 )   PT: 15.1 sec;   INR: 1.27 ratio         PTT - ( 20 Aug 2021 11:21 )  PTT:30.8 sec       PAST MEDICAL & SURGICAL HISTORY:  T2DM (type 2 diabetes mellitus)  c/b peripheral neuropathy    HTN (hypertension)    HLD (hyperlipidemia)    BPH (benign prostatic hyperplasia)    H/O shoulder surgery  right    H/O arthroscopy of knee    S/P arthroscopy of right knee          VITAL SIGNS    Telemetry: SR 55-60   Vital Signs Last 24 Hrs  T(C): 36.5 (21 @ 06:03), Max: 36.6 (21 @ 14:40)  T(F): 97.7 (21 @ 06:03), Max: 97.9 (21 @ 14:40)  HR: 64 (21 @ 06:03) (64 - 79)  BP: 125/74 (21 @ 06:03) (122/70 - 126/64)  RR: 18 (21 @ 06:03) (18 - 18)  SpO2: 95% (21 @ 06:03) (95% - 99%)             @ 07:01  -   @ 07:00  --------------------------------------------------------  IN: 1280 mL / OUT: 2300 mL / NET: -1020 mL     @ 07:01  -   @ 10:24  --------------------------------------------------------  IN: 360 mL / OUT: 400 mL / NET: -40 mL       Daily     Daily Weight in k.1 (22 Aug 2021 09:44)  Admit Wt: Drug Dosing Weight  Height (cm): 167.6 (20 Aug 2021 08:07)  Weight (kg): 86.2 (20 Aug 2021 08:07)  BMI (kg/m2): 30.7 (20 Aug 2021 08:07)  BSA (m2): 1.96 (20 Aug 2021 08:07)      CAPILLARY BLOOD GLUCOSE      POCT Blood Glucose.: 119 mg/dL (22 Aug 2021 08:00)  POCT Blood Glucose.: 256 mg/dL (21 Aug 2021 21:44)  POCT Blood Glucose.: 198 mg/dL (21 Aug 2021 17:19)  POCT Blood Glucose.: 224 mg/dL (21 Aug 2021 11:46)          MEDICATIONS  aspirin enteric coated 81 milliGRAM(s) Oral daily  dextrose 40% Gel 15 Gram(s) Oral once  dextrose 40% Gel 15 Gram(s) Oral once  dextrose 5%. 1000 milliLiter(s) IV Continuous <Continuous>  dextrose 5%. 1000 milliLiter(s) IV Continuous <Continuous>  dextrose 5%. 1000 milliLiter(s) IV Continuous <Continuous>  dextrose 50% Injectable 25 Gram(s) IV Push once  dextrose 50% Injectable 12.5 Gram(s) IV Push once  dextrose 50% Injectable 25 Gram(s) IV Push once  dextrose 50% Injectable 25 Gram(s) IV Push once  enoxaparin Injectable 90 milliGRAM(s) SubCutaneous every 12 hours  finasteride 5 milliGRAM(s) Oral daily  gabapentin 300 milliGRAM(s) Oral three times a day  gemfibrozil 600 milliGRAM(s) Oral two times a day  glucagon  Injectable 1 milliGRAM(s) IntraMuscular once  glucagon  Injectable 1 milliGRAM(s) IntraMuscular once  insulin glargine Injectable (LANTUS) 14 Unit(s) SubCutaneous at bedtime  insulin lispro (ADMELOG) corrective regimen sliding scale   SubCutaneous at bedtime  insulin lispro (ADMELOG) corrective regimen sliding scale   SubCutaneous three times a day before meals  insulin lispro Injectable (ADMELOG) 5 Unit(s) SubCutaneous three times a day before meals  metoprolol tartrate 25 milliGRAM(s) Oral two times a day  tamsulosin 0.4 milliGRAM(s) Oral at bedtime      >>> <<<  PHYSICAL EXAM  Subjective: NAD OOB to chair  Neurology: alert and oriented x 3, nonfocal, no gross deficits  CV : s1s2  Lungs: CTA b/l  Abdomen: soft, NT,ND, (+ )BM  :  voiding  Extremities:  -c/c/e     LABS  -    140  |  107  |  24<H>  ----------------------------<  108<H>  3.8   |  18<L>  |  0.83    Ca    9.3      22 Aug 2021 06:41    TPro  6.4  /  Alb  3.7  /  TBili  0.6  /  DBili  0.2  /  AST  15  /  ALT  11  /  AlkPhos  60                                   13.9   3.67  )-----------( 265      ( 22 Aug 2021 07:11 )             41.5          PT/INR - ( 20 Aug 2021 11:21 )   PT: 15.1 sec;   INR: 1.27 ratio      Urinalysis (21 @ 18:09)   pH Urine: 6.0   Glucose Qualitative, Urine: 1000 mg/dL   Blood, Urine: Negative   Color: Light Yellow   Urine Appearance: Clear   Bilirubin: Negative   Ketone - Urine: Negative   Specific Gravity: 1.036   Protein, Urine: Negative   Urobilinogen: Negative   Nitrite: Negative   Leukocyte Esterase Concentration: Negative < from: VA Duplex Carotid, Bilat (21 @ 10:13) >  FINDINGS:    Mild calcified plaque is present in the right carotid bulb. Otherwise, there is no significant plaque or luminal narrowing in the bilateral internal carotid arteries.    Blood flow velocities are as follows:    RIGHT:    PROX CCA = 75 cm/sec ;  DIST CCA = 58 cm/sec;  PROX ICA = 72 cm/sec;  DIST ICA = 42 cm/sec;  ECA = 74 cm/sec.    LEFT   :    PROX CCA = 84 cm/sec;  DIST CCA = 82 cm/sec ;  PROX ICA = 61 cm/sec;  DIST ICA = 57 cm/sec;  ECA = 69 cm/sec    Measurement of carotid stenosis is based on velocity parameters that correlate the residual internal carotid diameter with that of the more distal vessel in accordance with a method such as the [employer] Symptomatic Carotid Endarterectomy Trial (NASCET).    Flow in the bilateral vertebral arteries is antegrade.    IMPRESSION:  No evidence of hemodynamically significant stenosis in the bilateral internal carotid arteries.    < end of copied text >  < from: Xray Chest 1 View- PORTABLE-Urgent (Xray Chest 1 View- PORTABLE-Urgent .) (21 @ 18:48) >  INTERPRETATION:  Chest one view    HISTORY: Preop CABG    COMPARISON STUDY: None available    Frontal expiratory view of the chest shows the heart to be normal in size. The lungs are clear and there is no evidence of pneumothorax nor pleural effusion.    IMPRESSION:  No active pulmonary disease.    < end of copied text >  < from: TTE with Doppler (w/Cont) (21 @ 15:34) >  PROCEDURE: Transthoracic echocardiogram with 2-D, M-Mode  and complete spectral and color flow Doppler.Verbal  consent was obtained for injection of  Ultrasonic Enhancing  Agent following a discussion of risks and benefits.  Following intravenous injection of Ultrasonic Enhancing  Agent , harmonic imaging was performed.  INDICATION: Chronic ischemic heart disease, unspecified  (I25.9)  ------------------------------------------------------------------------  Dimensions:    Normal Values:  LA:     3.5    2.0 - 4.0 cm  Ao:     3.1    2.0 - 3.8 cm  SEPTUM: 1.3    0.6 - 1.2 cm  PWT:    0.8    0.6 - 1.1 cm  LVIDd:  4.4    3.0 - 5.6 cm  LVIDs:  3.3    1.8 - 4.0 cm  Derived variables:  LVMI: 82 g/m2  RWT: 0.37  EF (Visual Estimate): 60 %  ------------------------------------------------------------------------  Observations:  Mitral Valve: Mitral annular calcification. Mild mitral  regurgitation.  Aortic Valve/Aorta: Calcified trileaflet aortic valve with  normal opening. Minimal aortic regurgitation.  Normal aortic root size.  Left Atrium: Normal left atrium.  Left Ventricle: Endocardial visualization enhanced with  intravenous injection of Ultrasonic Enhancing Agent  (Definity).  Normal left ventricular internal dimensions and wall  thickness.  Estimated ejection fraction 60%.  Akinesis of the basal inferior and basal inferoseptal  segments.  Left ventricular filling pressure is normal.  Right Heart: Normal right atrium. Normal right ventricular  size and function.  Normal tricuspid valve. Normal pulmonic valve.  Pericardium/Pleura: Normal pericardium with no pericardial  effusion.  Hemodynamic: No evidence of pulmonary hypertension.  ------------------------------------------------------------------------  Conclusions:  Endocardial visualization enhanced with intravenous  injection of Ultrasonic Enhancing Agent (Definity).  Normal left ventricular internal dimensions and wall  thickness.  Estimated ejection fraction 60%. Akinesis of the basal  inferior and basal inferoseptal segments.    < end of copied text >       PTT - ( 20 Aug 2021 11:21 )  PTT:30.8 sec       PAST MEDICAL & SURGICAL HISTORY:  T2DM (type 2 diabetes mellitus)  c/b peripheral neuropathy    HTN (hypertension)    HLD (hyperlipidemia)    BPH (benign prostatic hyperplasia)    H/O shoulder surgery  right    H/O arthroscopy of knee    S/P arthroscopy of right knee

## 2021-08-22 NOTE — PROGRESS NOTE ADULT - PROBLEM SELECTOR PLAN 1
c/w bblocker, statin asa  PPI, dvt prophylaxis, incentive spirometry, ambulation  telemetry, strict I/O  lovenox bid  p2y12 in am

## 2021-08-23 DIAGNOSIS — E66.9 OBESITY, UNSPECIFIED: ICD-10-CM

## 2021-08-23 LAB
ANION GAP SERPL CALC-SCNC: 13 MMOL/L — SIGNIFICANT CHANGE UP (ref 5–17)
BUN SERPL-MCNC: 28 MG/DL — HIGH (ref 7–23)
CALCIUM SERPL-MCNC: 9.1 MG/DL — SIGNIFICANT CHANGE UP (ref 8.4–10.5)
CHLORIDE SERPL-SCNC: 107 MMOL/L — SIGNIFICANT CHANGE UP (ref 96–108)
CO2 SERPL-SCNC: 18 MMOL/L — LOW (ref 22–31)
CREAT SERPL-MCNC: 0.98 MG/DL — SIGNIFICANT CHANGE UP (ref 0.5–1.3)
GLUCOSE BLDC GLUCOMTR-MCNC: 111 MG/DL — HIGH (ref 70–99)
GLUCOSE BLDC GLUCOMTR-MCNC: 117 MG/DL — HIGH (ref 70–99)
GLUCOSE BLDC GLUCOMTR-MCNC: 132 MG/DL — HIGH (ref 70–99)
GLUCOSE BLDC GLUCOMTR-MCNC: 140 MG/DL — HIGH (ref 70–99)
GLUCOSE BLDC GLUCOMTR-MCNC: 140 MG/DL — HIGH (ref 70–99)
GLUCOSE BLDC GLUCOMTR-MCNC: 152 MG/DL — HIGH (ref 70–99)
GLUCOSE BLDC GLUCOMTR-MCNC: 152 MG/DL — HIGH (ref 70–99)
GLUCOSE BLDC GLUCOMTR-MCNC: 205 MG/DL — HIGH (ref 70–99)
GLUCOSE BLDC GLUCOMTR-MCNC: 266 MG/DL — HIGH (ref 70–99)
GLUCOSE BLDC GLUCOMTR-MCNC: 283 MG/DL — HIGH (ref 70–99)
GLUCOSE BLDC GLUCOMTR-MCNC: 57 MG/DL — LOW (ref 70–99)
GLUCOSE BLDC GLUCOMTR-MCNC: 63 MG/DL — LOW (ref 70–99)
GLUCOSE SERPL-MCNC: 154 MG/DL — HIGH (ref 70–99)
HCT VFR BLD CALC: 41.6 % — SIGNIFICANT CHANGE UP (ref 39–50)
HGB BLD-MCNC: 13.7 G/DL — SIGNIFICANT CHANGE UP (ref 13–17)
MCHC RBC-ENTMCNC: 31.3 PG — SIGNIFICANT CHANGE UP (ref 27–34)
MCHC RBC-ENTMCNC: 32.9 GM/DL — SIGNIFICANT CHANGE UP (ref 32–36)
MCV RBC AUTO: 95 FL — SIGNIFICANT CHANGE UP (ref 80–100)
NRBC # BLD: 0 /100 WBCS — SIGNIFICANT CHANGE UP (ref 0–0)
PA ADP PRP-ACNC: 93 PRU — LOW (ref 194–417)
PLATELET # BLD AUTO: 275 K/UL — SIGNIFICANT CHANGE UP (ref 150–400)
POTASSIUM SERPL-MCNC: 4.1 MMOL/L — SIGNIFICANT CHANGE UP (ref 3.5–5.3)
POTASSIUM SERPL-SCNC: 4.1 MMOL/L — SIGNIFICANT CHANGE UP (ref 3.5–5.3)
RBC # BLD: 4.38 M/UL — SIGNIFICANT CHANGE UP (ref 4.2–5.8)
RBC # FLD: 12.3 % — SIGNIFICANT CHANGE UP (ref 10.3–14.5)
SODIUM SERPL-SCNC: 138 MMOL/L — SIGNIFICANT CHANGE UP (ref 135–145)
WBC # BLD: 5.55 K/UL — SIGNIFICANT CHANGE UP (ref 3.8–10.5)
WBC # FLD AUTO: 5.55 K/UL — SIGNIFICANT CHANGE UP (ref 3.8–10.5)

## 2021-08-23 PROCEDURE — 99232 SBSQ HOSP IP/OBS MODERATE 35: CPT

## 2021-08-23 RX ORDER — INSULIN LISPRO 100/ML
4 VIAL (ML) SUBCUTANEOUS
Refills: 0 | Status: DISCONTINUED | OUTPATIENT
Start: 2021-08-23 | End: 2021-08-23

## 2021-08-23 RX ORDER — INSULIN GLARGINE 100 [IU]/ML
15 INJECTION, SOLUTION SUBCUTANEOUS AT BEDTIME
Refills: 0 | Status: DISCONTINUED | OUTPATIENT
Start: 2021-08-23 | End: 2021-08-24

## 2021-08-23 RX ORDER — ENOXAPARIN SODIUM 100 MG/ML
80 INJECTION SUBCUTANEOUS ONCE
Refills: 0 | Status: COMPLETED | OUTPATIENT
Start: 2021-08-23 | End: 2021-08-23

## 2021-08-23 RX ORDER — GLUCAGON INJECTION, SOLUTION 0.5 MG/.1ML
1 INJECTION, SOLUTION SUBCUTANEOUS ONCE
Refills: 0 | Status: DISCONTINUED | OUTPATIENT
Start: 2021-08-23 | End: 2021-08-27

## 2021-08-23 RX ORDER — INSULIN LISPRO 100/ML
VIAL (ML) SUBCUTANEOUS AT BEDTIME
Refills: 0 | Status: DISCONTINUED | OUTPATIENT
Start: 2021-08-23 | End: 2021-08-26

## 2021-08-23 RX ORDER — INSULIN LISPRO 100/ML
7 VIAL (ML) SUBCUTANEOUS
Refills: 0 | Status: DISCONTINUED | OUTPATIENT
Start: 2021-08-23 | End: 2021-08-24

## 2021-08-23 RX ORDER — INSULIN LISPRO 100/ML
VIAL (ML) SUBCUTANEOUS
Refills: 0 | Status: DISCONTINUED | OUTPATIENT
Start: 2021-08-23 | End: 2021-08-23

## 2021-08-23 RX ORDER — ENOXAPARIN SODIUM 100 MG/ML
80 INJECTION SUBCUTANEOUS
Refills: 0 | Status: DISCONTINUED | OUTPATIENT
Start: 2021-08-23 | End: 2021-08-25

## 2021-08-23 RX ORDER — INSULIN LISPRO 100/ML
VIAL (ML) SUBCUTANEOUS
Refills: 0 | Status: DISCONTINUED | OUTPATIENT
Start: 2021-08-23 | End: 2021-08-26

## 2021-08-23 RX ADMIN — GABAPENTIN 300 MILLIGRAM(S): 400 CAPSULE ORAL at 05:15

## 2021-08-23 RX ADMIN — GABAPENTIN 300 MILLIGRAM(S): 400 CAPSULE ORAL at 22:10

## 2021-08-23 RX ADMIN — TAMSULOSIN HYDROCHLORIDE 0.4 MILLIGRAM(S): 0.4 CAPSULE ORAL at 22:25

## 2021-08-23 RX ADMIN — Medication 600 MILLIGRAM(S): at 05:15

## 2021-08-23 RX ADMIN — Medication 25 MILLIGRAM(S): at 17:22

## 2021-08-23 RX ADMIN — Medication 6: at 11:33

## 2021-08-23 RX ADMIN — Medication 7 UNIT(S): at 17:23

## 2021-08-23 RX ADMIN — ENOXAPARIN SODIUM 80 MILLIGRAM(S): 100 INJECTION SUBCUTANEOUS at 11:28

## 2021-08-23 RX ADMIN — Medication 4 UNIT(S): at 07:59

## 2021-08-23 RX ADMIN — Medication 81 MILLIGRAM(S): at 11:29

## 2021-08-23 RX ADMIN — INSULIN GLARGINE 15 UNIT(S): 100 INJECTION, SOLUTION SUBCUTANEOUS at 22:09

## 2021-08-23 RX ADMIN — Medication 25 MILLIGRAM(S): at 05:15

## 2021-08-23 RX ADMIN — FINASTERIDE 5 MILLIGRAM(S): 5 TABLET, FILM COATED ORAL at 11:29

## 2021-08-23 RX ADMIN — Medication 4 UNIT(S): at 11:34

## 2021-08-23 RX ADMIN — ENOXAPARIN SODIUM 80 MILLIGRAM(S): 100 INJECTION SUBCUTANEOUS at 22:10

## 2021-08-23 RX ADMIN — Medication 1: at 22:38

## 2021-08-23 RX ADMIN — Medication 600 MILLIGRAM(S): at 17:22

## 2021-08-23 RX ADMIN — GABAPENTIN 300 MILLIGRAM(S): 400 CAPSULE ORAL at 13:26

## 2021-08-23 RX ADMIN — Medication 2: at 17:22

## 2021-08-23 NOTE — CONSULT NOTE ADULT - SUBJECTIVE AND OBJECTIVE BOX
HPI:  65 year old male (denies implanted devices) with significant PMHx of T2DM (last HgbA1c 8.1, managed by PCP, complicated by peripheral neuropathy), HLD, HTN, BPH presents for RHC/LHC. Patient c/o chest discomfort with exertion that has been becoming progressively worse and resolves with rest associated with SOB. Chest pain resolves with rest. Patient recently went to [facility] ER on 7/29 and patient states results were normal. Patient seen and evaluated by Dr. Celestin and presents for further evaluation.     Patient currenly denies chest pian, palpitations, orthopnea or PND.  (20 Aug 2021 08:17)  Patient has history of diabetes, A1C 7.1%, on several oral meds at home, no recent hypoglycemic episodes, no polyuria polydipsia. Patient follows up with PCP for diabetes management.  Endo was consulted for glycemic control.    PAST MEDICAL & SURGICAL HISTORY:  T2DM (type 2 diabetes mellitus)  c/b peripheral neuropathy    HTN (hypertension)    HLD (hyperlipidemia)    BPH (benign prostatic hyperplasia)    H/O shoulder surgery  right    H/O arthroscopy of knee    S/P arthroscopy of right knee        FAMILY HISTORY:  MI (myocardial infarction) (Father)        Social History:    Outpatient Medications:    MEDICATIONS  (STANDING):  aspirin enteric coated 81 milliGRAM(s) Oral daily  cefuroxime  IVPB 1500 milliGRAM(s) IV Intermittent once  dextrose 40% Gel 15 Gram(s) Oral once  dextrose 50% Injectable 25 Gram(s) IV Push once  enoxaparin Injectable 80 milliGRAM(s) SubCutaneous two times a day  finasteride 5 milliGRAM(s) Oral daily  gabapentin 300 milliGRAM(s) Oral three times a day  gemfibrozil 600 milliGRAM(s) Oral two times a day  glucagon  Injectable 1 milliGRAM(s) IntraMuscular once  insulin glargine Injectable (LANTUS) 15 Unit(s) SubCutaneous at bedtime  insulin lispro (ADMELOG) corrective regimen sliding scale   SubCutaneous three times a day before meals  insulin lispro (ADMELOG) corrective regimen sliding scale   SubCutaneous at bedtime  insulin lispro Injectable (ADMELOG) 7 Unit(s) SubCutaneous three times a day before meals  metoprolol tartrate 25 milliGRAM(s) Oral two times a day  tamsulosin 0.4 milliGRAM(s) Oral at bedtime    MEDICATIONS  (PRN):      Allergies    sulfa drugs (Hives)    Intolerances      Review of Systems:  Constitutional: No fever, no chills  Eyes: No blurry vision  Neuro: No tremors  HEENT: No pain, no neck swelling  Cardiovascular: No chest pain, no palpitations  Respiratory: Has SOB, no cough  GI: No nausea, vomiting, abdominal pain  : No dysuria  Skin: no rash  MSK: Has leg swelling.  Psych: no depression  Endocrine: no polyuria, polydipsia    ALL OTHER SYSTEMS REVIEWED AND NEGATIVE    UNABLE TO OBTAIN    PHYSICAL EXAM:  VITALS: T(C): 36.9 (08-23-21 @ 13:57)  T(F): 98.4 (08-23-21 @ 13:57), Max: 98.6 (08-22-21 @ 21:52)  HR: 72 (08-23-21 @ 13:57) (63 - 76)  BP: 125/72 (08-23-21 @ 13:57) (125/72 - 131/71)  RR:  (18 - 18)  SpO2:  (98% - 99%)  Wt(kg): --  GENERAL: NAD, well-groomed, well-developed  EYES: No proptosis, no lid lag  HEENT:  Atraumatic, Normocephalic  THYROID: Normal size, no palpable nodules  RESPIRATORY: Clear to auscultation bilaterally; No rales, rhonchi, wheezing  CARDIOVASCULAR: Si S2, No murmurs;  GI: Soft, non distended, normal bowel sounds  SKIN: Dry, intact, No rashes or lesions  MUSCULOSKELETAL: Has BL lower extremity edema.  NEURO:  no tremor, sensation decreased in feet BL,    POCT Blood Glucose.: 266 mg/dL (08-23-21 @ 11:29)  POCT Blood Glucose.: 152 mg/dL (08-23-21 @ 07:53)  POCT Blood Glucose.: 140 mg/dL (08-23-21 @ 03:59)  POCT Blood Glucose.: 132 mg/dL (08-23-21 @ 02:58)  POCT Blood Glucose.: 140 mg/dL (08-23-21 @ 02:30)  POCT Blood Glucose.: 152 mg/dL (08-23-21 @ 01:57)  POCT Blood Glucose.: 111 mg/dL (08-23-21 @ 01:31)  POCT Blood Glucose.: 63 mg/dL (08-23-21 @ 01:15)  POCT Blood Glucose.: 57 mg/dL (08-23-21 @ 00:58)  POCT Blood Glucose.: 117 mg/dL (08-22-21 @ 23:58)  POCT Blood Glucose.: 256 mg/dL (08-22-21 @ 23:04)  POCT Blood Glucose.: 319 mg/dL (08-22-21 @ 21:51)  POCT Blood Glucose.: 277 mg/dL (08-22-21 @ 16:45)  POCT Blood Glucose.: 188 mg/dL (08-22-21 @ 11:49)  POCT Blood Glucose.: 119 mg/dL (08-22-21 @ 08:00)  POCT Blood Glucose.: 256 mg/dL (08-21-21 @ 21:44)  POCT Blood Glucose.: 198 mg/dL (08-21-21 @ 17:19)  POCT Blood Glucose.: 224 mg/dL (08-21-21 @ 11:46)  POCT Blood Glucose.: 121 mg/dL (08-21-21 @ 07:45)  POCT Blood Glucose.: 257 mg/dL (08-20-21 @ 21:43)  POCT Blood Glucose.: 177 mg/dL (08-20-21 @ 17:03)                            13.7   5.55  )-----------( 275      ( 23 Aug 2021 04:06 )             41.6       08-23    138  |  107  |  28<H>  ----------------------------<  154<H>  4.1   |  18<L>  |  0.98    EGFR if : 94  EGFR if non : 81    Ca    9.1      08-23        Thyroid Function Tests:  08-21 @ 09:33 TSH -- FreeT4 1.3 T3 -- Anti TPO -- Anti Thyroglobulin Ab -- TSI --  08-21 @ 02:11 TSH 0.79 FreeT4 -- T3 75 Anti TPO -- Anti Thyroglobulin Ab -- TSI --          08-20 Chol 136 Direct LDL -- LDL calculated 91 HDL 35<L> Trig 50    Radiology:                  HPI:  65 year old male (denies implanted devices) with significant PMHx of T2DM (last HgbA1c 8.1, managed by PCP, complicated by peripheral neuropathy), HLD, HTN, BPH presents for RHC/LHC. Patient c/o chest discomfort with exertion that has been becoming progressively worse and resolves with rest associated with SOB. Chest pain resolves with rest. Patient recently went to [facility] ER on 7/29 and patient states results were normal. Patient seen and evaluated by Dr. Celestin and presents for further evaluation.     Patient currenly denies chest pian, palpitations, orthopnea or PND.  (20 Aug 2021 08:17)  Patient has history of diabetes, A1C 7.1%, on several oral meds at home, no recent hypoglycemic episodes, no polyuria polydipsia. Patient follows up with PCP for diabetes management.  Endo was consulted for glycemic control.    PAST MEDICAL & SURGICAL HISTORY:  T2DM (type 2 diabetes mellitus)  c/b peripheral neuropathy    HTN (hypertension)    HLD (hyperlipidemia)    BPH (benign prostatic hyperplasia)    H/O shoulder surgery  right    H/O arthroscopy of knee    S/P arthroscopy of right knee        FAMILY HISTORY:  MI (myocardial infarction) (Father)        Social History:    Outpatient Medications:    MEDICATIONS  (STANDING):  aspirin enteric coated 81 milliGRAM(s) Oral daily  cefuroxime  IVPB 1500 milliGRAM(s) IV Intermittent once  dextrose 40% Gel 15 Gram(s) Oral once  dextrose 50% Injectable 25 Gram(s) IV Push once  enoxaparin Injectable 80 milliGRAM(s) SubCutaneous two times a day  finasteride 5 milliGRAM(s) Oral daily  gabapentin 300 milliGRAM(s) Oral three times a day  gemfibrozil 600 milliGRAM(s) Oral two times a day  glucagon  Injectable 1 milliGRAM(s) IntraMuscular once  insulin glargine Injectable (LANTUS) 15 Unit(s) SubCutaneous at bedtime  insulin lispro (ADMELOG) corrective regimen sliding scale   SubCutaneous three times a day before meals  insulin lispro (ADMELOG) corrective regimen sliding scale   SubCutaneous at bedtime  insulin lispro Injectable (ADMELOG) 7 Unit(s) SubCutaneous three times a day before meals  metoprolol tartrate 25 milliGRAM(s) Oral two times a day  tamsulosin 0.4 milliGRAM(s) Oral at bedtime    MEDICATIONS  (PRN):      Allergies    sulfa drugs (Hives)    Intolerances      Review of Systems:  Constitutional: No fever, no chills  Eyes: No blurry vision  Neuro: No tremors  HEENT: No pain, no neck swelling  Cardiovascular: No chest pain, no palpitations  Respiratory: Has SOB, no cough  GI: No nausea, vomiting, abdominal pain  : No dysuria  Skin: no rash  MSK: Has leg swelling.  Psych: no depression  Endocrine: no polyuria, polydipsia    ALL OTHER SYSTEMS REVIEWED AND NEGATIVE    UNABLE TO OBTAIN    PHYSICAL EXAM:  VITALS: T(C): 36.9 (08-23-21 @ 13:57)  T(F): 98.4 (08-23-21 @ 13:57), Max: 98.6 (08-22-21 @ 21:52)  HR: 72 (08-23-21 @ 13:57) (63 - 76)  BP: 125/72 (08-23-21 @ 13:57) (125/72 - 131/71)  RR:  (18 - 18)  SpO2:  (98% - 99%)  Wt(kg): --  GENERAL: NAD, well-groomed, well-developed  EYES: No proptosis, no lid lag  HEENT:  Atraumatic, Normocephalic  THYROID: Normal size, no palpable nodules  RESPIRATORY: Clear to auscultation bilaterally; No rales, rhonchi, wheezing  CARDIOVASCULAR: Si S2, No murmurs;  GI: Soft, non distended, normal bowel sounds  SKIN: Dry, intact, No rashes or lesions  MUSCULOSKELETAL: Has BL lower extremity edema.  NEURO:  no tremor, sensation decreased in feet BL,    POCT Blood Glucose.: 266 mg/dL (08-23-21 @ 11:29)  POCT Blood Glucose.: 152 mg/dL (08-23-21 @ 07:53)  POCT Blood Glucose.: 140 mg/dL (08-23-21 @ 03:59)  POCT Blood Glucose.: 132 mg/dL (08-23-21 @ 02:58)  POCT Blood Glucose.: 140 mg/dL (08-23-21 @ 02:30)  POCT Blood Glucose.: 152 mg/dL (08-23-21 @ 01:57)  POCT Blood Glucose.: 111 mg/dL (08-23-21 @ 01:31)  POCT Blood Glucose.: 63 mg/dL (08-23-21 @ 01:15)  POCT Blood Glucose.: 57 mg/dL (08-23-21 @ 00:58)  POCT Blood Glucose.: 117 mg/dL (08-22-21 @ 23:58)  POCT Blood Glucose.: 256 mg/dL (08-22-21 @ 23:04)  POCT Blood Glucose.: 319 mg/dL (08-22-21 @ 21:51)  POCT Blood Glucose.: 277 mg/dL (08-22-21 @ 16:45)  POCT Blood Glucose.: 188 mg/dL (08-22-21 @ 11:49)  POCT Blood Glucose.: 119 mg/dL (08-22-21 @ 08:00)  POCT Blood Glucose.: 256 mg/dL (08-21-21 @ 21:44)  POCT Blood Glucose.: 198 mg/dL (08-21-21 @ 17:19)  POCT Blood Glucose.: 224 mg/dL (08-21-21 @ 11:46)  POCT Blood Glucose.: 121 mg/dL (08-21-21 @ 07:45)  POCT Blood Glucose.: 257 mg/dL (08-20-21 @ 21:43)  POCT Blood Glucose.: 177 mg/dL (08-20-21 @ 17:03)                            13.7   5.55  )-----------( 275      ( 23 Aug 2021 04:06 )             41.6       08-23    138  |  107  |  28<H>  ----------------------------<  154<H>  4.1   |  18<L>  |  0.98    EGFR if : 94  EGFR if non : 81    Ca    9.1      08-23        Thyroid Function Tests:  08-21 @ 09:33 TSH -- FreeT4 1.3 T3 -- Anti TPO -- Anti Thyroglobulin Ab -- TSI --  08-21 @ 02:11 TSH 0.79 FreeT4 -- T3 75 Anti TPO -- Anti Thyroglobulin Ab -- TSI --          08-20 Chol 136 Direct LDL -- LDL calculated 91 HDL 35<L> Trig 50    Radiology:

## 2021-08-23 NOTE — PROGRESS NOTE ADULT - PROBLEM SELECTOR PLAN 1
c/w bblocker, asa  Start statin>lopid at home  PPI, dvt prophylaxis, incentive spirometry, ambulation  telemetry, strict I/O  lovenox x 1 today> poss OR am  Flomax/Proscar BPH  p2y12 in am c/w bblocker, asa  Start statin>lopid at home  PPI, dvt prophylaxis, incentive spirometry, ambulation  telemetry, strict I/O  lovenox q12  Flomax/Proscar BPH  p2y12 tomorrow evening

## 2021-08-23 NOTE — CONSULT NOTE ADULT - ASSESSMENT
Assessment  DMT2: 65y Male with DM T2 with hyperglycemia, A1C 7.1%, was on several oral meds at home, now on insulin, Lantus dose held last night, received IV insulin, had hypoglycemic episode overnight, blood sugars now running high and not at preop target, eating meals, appears comfortable, planning CABG.  CAD: on medications, no chest pain, stable, monitored.  HTN: Controlled,  on antihypertensive medications.  Obesity: No strict exercise routines, not on any weight loss program, neither on low calorie diet.        Cb Hill MD  Cell: [phone]  Office: [phone]     Assessment  DMT2: 65y Male with DM T2 with hyperglycemia, A1C 7.1%, was on several oral meds at home, now on insulin, Lantus dose held last night, received IV insulin,  had hypoglycemic episode overnight, blood sugars now running high and not at preop target, eating meals, appears comfortable, planning CABG.  CAD: on medications, no chest pain, stable, monitored.  HTN: Controlled,  on antihypertensive medications.  Obesity: No strict exercise routines, not on any weight loss program, neither on low calorie diet.        Cb Hill MD  Cell: [phone]  Office: [phone]

## 2021-08-23 NOTE — PROGRESS NOTE ADULT - SUBJECTIVE AND OBJECTIVE BOX
Subjective:  "I am ok, no pain"  Sleeping in bed    Tele:  SR  60-70                              T(C): 36.6 (08-23-21 @ 05:32), Max: 37 (08-22-21 @ 21:52)  HR: 63 (08-23-21 @ 05:32) (63 - 76)  BP: 126/75 (08-23-21 @ 05:32) (120/63 - 131/71)  RR: 18 (08-23-21 @ 05:32) (18 - 18)  SpO2: 98% (08-23-21 @ 05:32) (98% - 99%)        08-23    138  |  107  |  28<H>  ----------------------------<  154<H>  4.1   |  18<L>  |  0.98    Ca    9.1      23 Aug 2021 04:06                                 13.7   5.55  )-----------( 275      ( 23 Aug 2021 04:06 )             41.6            CAPILLARY BLOOD GLUCOSE      POCT Blood Glucose.: 140 mg/dL (23 Aug 2021 03:59)  POCT Blood Glucose.: 132 mg/dL (23 Aug 2021 02:58)  POCT Blood Glucose.: 140 mg/dL (23 Aug 2021 02:30)  POCT Blood Glucose.: 152 mg/dL (23 Aug 2021 01:57)  POCT Blood Glucose.: 111 mg/dL (23 Aug 2021 01:31)  POCT Blood Glucose.: 63 mg/dL (23 Aug 2021 01:15)  POCT Blood Glucose.: 57 mg/dL (23 Aug 2021 00:58)  POCT Blood Glucose.: 117 mg/dL (22 Aug 2021 23:58)  POCT Blood Glucose.: 256 mg/dL (22 Aug 2021 23:04)  POCT Blood Glucose.: 319 mg/dL (22 Aug 2021 21:51)  POCT Blood Glucose.: 277 mg/dL (22 Aug 2021 16:45)  POCT Blood Glucose.: 188 mg/dL (22 Aug 2021 11:49)  POCT Blood Glucose.: 119 mg/dL (22 Aug 2021 08:00)            Assessment    Neurology: alert and oriented x 3,     CV : s1s2    Lungs: CTA b/l    Abdomen: soft, NT,ND, (+ )BM    :  voiding    Extremities:  -c/c/e         MEDICATIONS  (STANDING):  aspirin enteric coated 81 milliGRAM(s) Oral daily  cefuroxime  IVPB 1500 milliGRAM(s) IV Intermittent once  chlorhexidine 0.12% Liquid 15 milliLiter(s) Oral Mucosa two times a day  chlorhexidine 4% Liquid 1 Application(s) Topical two times a day  dextrose 40% Gel 15 Gram(s) Oral once  dextrose 50% Injectable 25 Gram(s) IV Push once  enoxaparin Injectable 80 milliGRAM(s) SubCutaneous once  finasteride 5 milliGRAM(s) Oral daily  gabapentin 300 milliGRAM(s) Oral three times a day  gemfibrozil 600 milliGRAM(s) Oral two times a day  insulin lispro (ADMELOG) corrective regimen sliding scale   SubCutaneous three times a day before meals  insulin lispro Injectable (ADMELOG) 4 Unit(s) SubCutaneous before breakfast  insulin lispro Injectable (ADMELOG) 4 Unit(s) SubCutaneous before lunch  insulin lispro Injectable (ADMELOG) 4 Unit(s) SubCutaneous before dinner  metoprolol tartrate 25 milliGRAM(s) Oral two times a day  tamsulosin 0.4 milliGRAM(s) Oral at bedtime       PAST MEDICAL & SURGICAL HISTORY:  T2DM (type 2 diabetes mellitus)  c/b peripheral neuropathy    HTN (hypertension)    HLD (hyperlipidemia)    BPH (benign prostatic hyperplasia)    H/O shoulder surgery  right    H/O arthroscopy of knee    S/P arthroscopy of right knee

## 2021-08-23 NOTE — CONSULT NOTE ADULT - PROBLEM SELECTOR RECOMMENDATION 9
On medications,  no chest pain, stable, monitored and followed up by primary cardiothoracic team/cardiology team.
Pre op Cardiac surgery work up in progress   Continue with ASA 81 mg PO daily   Start Lopressor 25 mg PO BID   Start Statin Lipitor 40 mg PO HS   Continue with gemfibrozil 600 mg PO BID   D/C enalapril to optimize renal function preoperatively   Type and Screen x 2   Vein Mapping   TTE   MRSA / MSSA nasal PCR   Plan for Cardiac Surgery with Dr. Valerio tentatively for Monday 8/23/21

## 2021-08-23 NOTE — CONSULT NOTE ADULT - PROBLEM SELECTOR RECOMMENDATION 3
Will start Lantus 15u at bedtime.  Will increase Admelog to 7u before each meal and adjust Admelog correction scale coverage to be low-scale ac/hs. Will continue monitoring FS and FU.  Patient counseled for compliance with consistent low carb diet and exercise as tolerated outpatient.
Start Lopressor 25 mg PO BID  D/C enalapril to optimize renal function preoperatively

## 2021-08-23 NOTE — CONSULT NOTE ADULT - PROBLEM SELECTOR RECOMMENDATION 4
Overweight/Obesity: Patient counseled for weight loss, exercise, low calorie diet.
Send Hgb A1C  Accuchecks 4 times a day AC and HS and cover with Admelog corrective scale   Continue with DASH/ Diabetic Diet

## 2021-08-23 NOTE — PROGRESS NOTE ADULT - ASSESSMENT
64 yo Male (denies implanted devices) with significant PMHx: of T2DM (last HgbA1c 8.1, managed by PCP, complicated by peripheral neuropathy), HLD, HTN, BPH presents for RHC/LHC. Patient c/o chest discomfort with exertion that has been becoming progressively worse and resolves with rest associated with SOB. Chest pain resolves with rest. Patient recently went to [facility] ER on 7/29 and patient states results were normal. Patient seen and evaluated by Dr. Celestin and presents for further evaluation and cardiac Cath.  Cath finding revealed: proximal left main 50 %, proximal LAD 95 % stenosis, proximal circumflex: 95 % and proximal RCA: 95 % stenosis. CTS consult called to evaluate patient for cardiac surgery with Dr. Deep Valerio.   8/22 P2Y12 80. Continue with BBlocker, statin asa. CABG tentatively scheduled in am pending repeat p2y12 in am 0400  8/23 CABG postponed P2Y12> 92 this am  Endo consulted elevated BS, Start statin(lopid at home)   ASA betablocker CAD Recheck P2Y12 am

## 2021-08-24 LAB
ANION GAP SERPL CALC-SCNC: 11 MMOL/L — SIGNIFICANT CHANGE UP (ref 5–17)
BUN SERPL-MCNC: 25 MG/DL — HIGH (ref 7–23)
CALCIUM SERPL-MCNC: 9.3 MG/DL — SIGNIFICANT CHANGE UP (ref 8.4–10.5)
CHLORIDE SERPL-SCNC: 107 MMOL/L — SIGNIFICANT CHANGE UP (ref 96–108)
CO2 SERPL-SCNC: 17 MMOL/L — LOW (ref 22–31)
CREAT SERPL-MCNC: 0.88 MG/DL — SIGNIFICANT CHANGE UP (ref 0.5–1.3)
GLUCOSE BLDC GLUCOMTR-MCNC: 123 MG/DL — HIGH (ref 70–99)
GLUCOSE BLDC GLUCOMTR-MCNC: 156 MG/DL — HIGH (ref 70–99)
GLUCOSE BLDC GLUCOMTR-MCNC: 159 MG/DL — HIGH (ref 70–99)
GLUCOSE BLDC GLUCOMTR-MCNC: 159 MG/DL — HIGH (ref 70–99)
GLUCOSE BLDC GLUCOMTR-MCNC: 172 MG/DL — HIGH (ref 70–99)
GLUCOSE SERPL-MCNC: 183 MG/DL — HIGH (ref 70–99)
HCT VFR BLD CALC: 40.6 % — SIGNIFICANT CHANGE UP (ref 39–50)
HGB BLD-MCNC: 13.4 G/DL — SIGNIFICANT CHANGE UP (ref 13–17)
MCHC RBC-ENTMCNC: 31.2 PG — SIGNIFICANT CHANGE UP (ref 27–34)
MCHC RBC-ENTMCNC: 33 GM/DL — SIGNIFICANT CHANGE UP (ref 32–36)
MCV RBC AUTO: 94.4 FL — SIGNIFICANT CHANGE UP (ref 80–100)
NRBC # BLD: 0 /100 WBCS — SIGNIFICANT CHANGE UP (ref 0–0)
PA ADP PRP-ACNC: 151 PRU — LOW (ref 194–417)
PLATELET # BLD AUTO: 277 K/UL — SIGNIFICANT CHANGE UP (ref 150–400)
POTASSIUM SERPL-MCNC: 3.7 MMOL/L — SIGNIFICANT CHANGE UP (ref 3.5–5.3)
POTASSIUM SERPL-SCNC: 3.7 MMOL/L — SIGNIFICANT CHANGE UP (ref 3.5–5.3)
RBC # BLD: 4.3 M/UL — SIGNIFICANT CHANGE UP (ref 4.2–5.8)
RBC # FLD: 12.2 % — SIGNIFICANT CHANGE UP (ref 10.3–14.5)
SODIUM SERPL-SCNC: 135 MMOL/L — SIGNIFICANT CHANGE UP (ref 135–145)
WBC # BLD: 4.19 K/UL — SIGNIFICANT CHANGE UP (ref 3.8–10.5)
WBC # FLD AUTO: 4.19 K/UL — SIGNIFICANT CHANGE UP (ref 3.8–10.5)

## 2021-08-24 PROCEDURE — 99232 SBSQ HOSP IP/OBS MODERATE 35: CPT

## 2021-08-24 RX ORDER — CHLORHEXIDINE GLUCONATE 213 G/1000ML
1 SOLUTION TOPICAL ONCE
Refills: 0 | Status: COMPLETED | OUTPATIENT
Start: 2021-08-24 | End: 2021-08-25

## 2021-08-24 RX ORDER — CHLORHEXIDINE GLUCONATE 213 G/1000ML
10 SOLUTION TOPICAL ONCE
Refills: 0 | Status: DISCONTINUED | OUTPATIENT
Start: 2021-08-24 | End: 2021-08-25

## 2021-08-24 RX ORDER — CHLORHEXIDINE GLUCONATE 213 G/1000ML
1 SOLUTION TOPICAL ONCE
Refills: 0 | Status: COMPLETED | OUTPATIENT
Start: 2021-08-24 | End: 2021-08-24

## 2021-08-24 RX ORDER — CHLORHEXIDINE GLUCONATE 213 G/1000ML
1 SOLUTION TOPICAL ONCE
Refills: 0 | Status: COMPLETED | OUTPATIENT
Start: 2021-08-25 | End: 2021-08-25

## 2021-08-24 RX ORDER — INSULIN GLARGINE 100 [IU]/ML
18 INJECTION, SOLUTION SUBCUTANEOUS AT BEDTIME
Refills: 0 | Status: DISCONTINUED | OUTPATIENT
Start: 2021-08-24 | End: 2021-08-26

## 2021-08-24 RX ORDER — CEFUROXIME AXETIL 250 MG
1500 TABLET ORAL ONCE
Refills: 0 | Status: COMPLETED | OUTPATIENT
Start: 2021-08-24 | End: 2021-08-25

## 2021-08-24 RX ORDER — INSULIN LISPRO 100/ML
8 VIAL (ML) SUBCUTANEOUS
Refills: 0 | Status: DISCONTINUED | OUTPATIENT
Start: 2021-08-24 | End: 2021-08-25

## 2021-08-24 RX ORDER — POTASSIUM CHLORIDE 20 MEQ
40 PACKET (EA) ORAL ONCE
Refills: 0 | Status: COMPLETED | OUTPATIENT
Start: 2021-08-24 | End: 2021-08-24

## 2021-08-24 RX ADMIN — Medication 25 MILLIGRAM(S): at 05:42

## 2021-08-24 RX ADMIN — Medication 81 MILLIGRAM(S): at 12:15

## 2021-08-24 RX ADMIN — TAMSULOSIN HYDROCHLORIDE 0.4 MILLIGRAM(S): 0.4 CAPSULE ORAL at 22:35

## 2021-08-24 RX ADMIN — GABAPENTIN 300 MILLIGRAM(S): 400 CAPSULE ORAL at 13:41

## 2021-08-24 RX ADMIN — GABAPENTIN 300 MILLIGRAM(S): 400 CAPSULE ORAL at 22:35

## 2021-08-24 RX ADMIN — Medication 600 MILLIGRAM(S): at 17:18

## 2021-08-24 RX ADMIN — Medication 8 UNIT(S): at 12:15

## 2021-08-24 RX ADMIN — Medication 40 MILLIEQUIVALENT(S): at 08:37

## 2021-08-24 RX ADMIN — ENOXAPARIN SODIUM 80 MILLIGRAM(S): 100 INJECTION SUBCUTANEOUS at 05:42

## 2021-08-24 RX ADMIN — Medication 7 UNIT(S): at 07:36

## 2021-08-24 RX ADMIN — Medication 600 MILLIGRAM(S): at 05:42

## 2021-08-24 RX ADMIN — INSULIN GLARGINE 18 UNIT(S): 100 INJECTION, SOLUTION SUBCUTANEOUS at 22:35

## 2021-08-24 RX ADMIN — FINASTERIDE 5 MILLIGRAM(S): 5 TABLET, FILM COATED ORAL at 12:15

## 2021-08-24 RX ADMIN — ENOXAPARIN SODIUM 80 MILLIGRAM(S): 100 INJECTION SUBCUTANEOUS at 17:18

## 2021-08-24 RX ADMIN — Medication 1: at 17:17

## 2021-08-24 RX ADMIN — Medication 25 MILLIGRAM(S): at 17:19

## 2021-08-24 RX ADMIN — Medication 8 UNIT(S): at 17:18

## 2021-08-24 RX ADMIN — CHLORHEXIDINE GLUCONATE 1 APPLICATION(S): 213 SOLUTION TOPICAL at 21:17

## 2021-08-24 RX ADMIN — Medication 1: at 07:35

## 2021-08-24 RX ADMIN — GABAPENTIN 300 MILLIGRAM(S): 400 CAPSULE ORAL at 05:42

## 2021-08-24 NOTE — PROGRESS NOTE ADULT - ASSESSMENT
66 yo Male (denies implanted devices) with significant PMHx of T2DM (last HgbA1c 8.1, managed by PCP, complicated by peripheral neuropathy), HLD, HTN, BPH presented for RHC/LHC. Patient c/o chest discomfort with exertion that has been becoming progressively worse and resolves with rest associated with SOB.  Patient recently went to [facility] ER on 7/29 and patient states results were normal. Patient seen and evaluated by Dr. Celestin and presented for further evaluation and cardiac Cath.  Cath finding revealed: proximal left main 50 %, proximal LAD 95 % stenosis, proximal circumflex: 95 % and proximal RCA: 95 % stenosis. CTS consult called to evaluate patient for cardiac surgery with Dr. Deep Valerio.   8/22 P2Y12 80. Continue with BBlocker, statin asa. CABG tentatively scheduled in am pending repeat p2y12 in am 0400  8/23 CABG postponed P2Y12> 92 this am  Endo consulted elevated BS, Start statin (lopid at home)   ASA betablocker CAD Recheck P2Y12 am  8/24 P2Y12 @ 5pm-->   T&S to be sent at 5pm as well.      Problem/Plan - 1:  ·  Problem: CAD (coronary artery disease).  Plan: c/w bblocker, asa  Start statin>lopid at home  PPI, dvt prophylaxis, incentive spirometry, ambulation  telemetry, strict I/O  lovenox q12  Flomax/Proscar BPH  p2y12 tonight  T&S tonight  NPO after midnight  ***Endo following for uncontrolled FS.  Was previously requiring insulin gtt.   this am, 123 this afternoon.  Continue with basal/bolus, doses per Endo team.

## 2021-08-24 NOTE — PROGRESS NOTE ADULT - SUBJECTIVE AND OBJECTIVE BOX
Patient discussed on morning rounds with Dr. Valerio    Operation / Date: Pre-op for CABG, Wednesday 8/25/21    SUBJECTIVE ASSESSMENT:  Patient       Vital Signs Last 24 Hrs  T(C): 36.6 (24 Aug 2021 05:15), Max: 37 (23 Aug 2021 18:52)  T(F): 97.9 (24 Aug 2021 05:15), Max: 98.6 (23 Aug 2021 18:52)  HR: 71 (24 Aug 2021 05:15) (71 - 73)  BP: 106/67 (24 Aug 2021 05:15) (106/67 - 125/72)  BP(mean): --  RR: 18 (24 Aug 2021 05:15) (18 - 18)  SpO2: 98% (24 Aug 2021 05:15) (98% - 99%)  I&O's Detail    22 Aug 2021 07:01  -  23 Aug 2021 07:00  --------------------------------------------------------  IN:    Insulin: 18 mL    Oral Fluid: 1097 mL  Total IN: 1115 mL    OUT:    Voided (mL): 2900 mL  Total OUT: 2900 mL    Total NET: -1785 mL      23 Aug 2021 07:01  -  24 Aug 2021 06:20  --------------------------------------------------------  IN:    Oral Fluid: 820 mL  Total IN: 820 mL    OUT:    Voided (mL): 2250 mL  Total OUT: 2250 mL    Total NET: -1430 mL          CHEST TUBE:  Yes/No. AIR LEAKS: Yes/No. Suction / H2O SEAL.   DILIP DRAIN:  Yes/No.  EPICARDIAL WIRES: Yes/No.  TIE DOWNS: Yes/No.  MARTIN: Yes/No.    PHYSICAL EXAM:    GEN: NAD, looks comfortable  Psych: Mood appropriate  Neuro: A&Ox3.  No focal deficits.  Moving all extremities.   HEENT: No obvious abnormalities  CV: S1S2, regular, no murmurs appreciated.  No carotid bruits.  No JVD  Lungs: Clear B/L.  No wheezing, rales or rhonchi  ABD: Soft, non-tender, non-distended.  +Bowel sounds  EXT: Warm and well perfused.  No peripheral edema noted  Musculoskeletal: Moving all extremities with normal ROM, no joint swelling  PV: Pedal pulses palpable  Incision Sites:    LABS:                        13.4   4.19  )-----------( 277      ( 24 Aug 2021 05:35 )             40.6       COUMADIN:  Yes/No. REASON: .        08-24    135  |  107  |  25<H>  ----------------------------<  183<H>  3.7   |  17<L>  |  0.88    Ca    9.3      24 Aug 2021 05:35            MEDICATIONS  (STANDING):  aspirin enteric coated 81 milliGRAM(s) Oral daily  cefuroxime  IVPB 1500 milliGRAM(s) IV Intermittent once  dextrose 40% Gel 15 Gram(s) Oral once  dextrose 50% Injectable 25 Gram(s) IV Push once  enoxaparin Injectable 80 milliGRAM(s) SubCutaneous two times a day  finasteride 5 milliGRAM(s) Oral daily  gabapentin 300 milliGRAM(s) Oral three times a day  gemfibrozil 600 milliGRAM(s) Oral two times a day  glucagon  Injectable 1 milliGRAM(s) IntraMuscular once  insulin glargine Injectable (LANTUS) 15 Unit(s) SubCutaneous at bedtime  insulin lispro (ADMELOG) corrective regimen sliding scale   SubCutaneous three times a day before meals  insulin lispro (ADMELOG) corrective regimen sliding scale   SubCutaneous at bedtime  insulin lispro Injectable (ADMELOG) 7 Unit(s) SubCutaneous three times a day before meals  metoprolol tartrate 25 milliGRAM(s) Oral two times a day  tamsulosin 0.4 milliGRAM(s) Oral at bedtime    MEDICATIONS  (PRN):        RADIOLOGY & ADDITIONAL TESTS:    < from: Xray Chest 1 View- PORTABLE-Urgent (Xray Chest 1 View- PORTABLE-Urgent .) (08.20.21 @ 18:48) >    IMPRESSION:  No active pulmonary disease.    < end of copied text >

## 2021-08-24 NOTE — PROGRESS NOTE ADULT - ASSESSMENT
Assessment  DMT2: 65y Male with DM T2 with hyperglycemia, A1C 7.1%, was on several oral meds at home, now on basal bolus insulin, increased dose yesterday, blood sugars are improving though still not at preop target, no hypoglycemias. Patient is eating meals, compliant with low-carb diet, appears comfortable, planning CABG possibly tomorrow.  CAD: on medications, no chest pain, stable, monitored.  HTN: Controlled,  on antihypertensive medications.  Obesity: No strict exercise routines, not on any weight loss program, neither on low calorie diet.        Cb Hill MD  Cell: [phone]  Office: [phone]     Assessment  DMT2: 65y Male with DM T2 with hyperglycemia, A1C 7.1%, was on several oral meds at home, now on basal bolus insulin, increased dose yesterday, blood sugars are improving though still not at preop target, no hypoglycemias.  Patient is eating meals, compliant with low-carb diet, appears comfortable, planning CABG possibly tomorrow.  CAD: on medications, no chest pain, stable, monitored.  HTN: Controlled,  on antihypertensive medications.  Obesity: No strict exercise routines, not on any weight loss program, neither on low calorie diet.        Cb Hill MD  Cell: [phone]  Office: [phone]

## 2021-08-24 NOTE — PRE-ANESTHESIA EVALUATION ADULT - NSRADCARDRESULTSFT_GEN_ALL_CORE
TTE 8/20/2021  Conclusions:   Endocardial visualization enhanced with intravenous injection of Ultrasonic Enhancing Agent (Definity).  Normal left ventricular internal dimensions and wall thickness.  Estimated ejection fraction 60%. Akinesis of the basal inferior and basal inferoseptal segments.     Cardiac Cath 8/20/21  CORONARY VESSELS: The coronary circulation is right dominant.  LM:   --  Proximal left main: There was a 50 % stenosis.  LAD:   --  Proximal LAD: There was a 95 % stenosis.  CX:   --  Proximal circumflex: There was a 95 % stenosis.  RCA:   --  Proximal RCA: There was a 95 % stenosis.

## 2021-08-24 NOTE — PRE-ANESTHESIA EVALUATION ADULT - NSANTHPMHFT_GEN_ALL_CORE
64 yo M w/ PMHx of DM2, peripheral diabetic neuropathy, HLD, HTN, BPH w/ worsening anginal symptoms presented for cardiac Cath electively.  Cath revealed multi-vessel CAD: proximal left main 50 %, proximal LAD 95 % stenosis, proximal circumflex: 95 % and proximal RCA: 95 % stenosis. Presents today for CABG.

## 2021-08-24 NOTE — PROGRESS NOTE ADULT - SUBJECTIVE AND OBJECTIVE BOX
Chief complaint  Patient is a 65y old  Male who presents with a chief complaint of CAD (24 Aug 2021 13:56)   Review of systems  Patient awake in chair, looks comfortable, no hypoglycemic episodes.    Labs and Fingersticks  CAPILLARY BLOOD GLUCOSE      POCT Blood Glucose.: 123 mg/dL (24 Aug 2021 11:42)  POCT Blood Glucose.: 159 mg/dL (24 Aug 2021 07:21)  POCT Blood Glucose.: 159 mg/dL (24 Aug 2021 06:53)  POCT Blood Glucose.: 283 mg/dL (23 Aug 2021 21:38)  POCT Blood Glucose.: 205 mg/dL (23 Aug 2021 16:25)      Anion Gap, Serum: 11 (08-24 @ 05:35)  Anion Gap, Serum: 13 (08-23 @ 04:06)      Calcium, Total Serum: 9.3 (08-24 @ 05:35)  Calcium, Total Serum: 9.1 (08-23 @ 04:06)          08-24    135  |  107  |  25<H>  ----------------------------<  183<H>  3.7   |  17<L>  |  0.88    Ca    9.3      24 Aug 2021 05:35                          13.4   4.19  )-----------( 277      ( 24 Aug 2021 05:35 )             40.6     Medications  MEDICATIONS  (STANDING):  aspirin enteric coated 81 milliGRAM(s) Oral daily  cefuroxime  IVPB 1500 milliGRAM(s) IV Intermittent once  cefuroxime  IVPB 1500 milliGRAM(s) IV Intermittent once  chlorhexidine 0.12% Liquid 10 milliLiter(s) Swish and Spit once  chlorhexidine 4% Liquid 1 Application(s) Topical once  chlorhexidine 4% Liquid 1 Application(s) Topical once  dextrose 40% Gel 15 Gram(s) Oral once  dextrose 50% Injectable 25 Gram(s) IV Push once  enoxaparin Injectable 80 milliGRAM(s) SubCutaneous two times a day  finasteride 5 milliGRAM(s) Oral daily  gabapentin 300 milliGRAM(s) Oral three times a day  gemfibrozil 600 milliGRAM(s) Oral two times a day  glucagon  Injectable 1 milliGRAM(s) IntraMuscular once  insulin glargine Injectable (LANTUS) 18 Unit(s) SubCutaneous at bedtime  insulin lispro (ADMELOG) corrective regimen sliding scale   SubCutaneous three times a day before meals  insulin lispro (ADMELOG) corrective regimen sliding scale   SubCutaneous at bedtime  insulin lispro Injectable (ADMELOG) 8 Unit(s) SubCutaneous three times a day before meals  metoprolol tartrate 25 milliGRAM(s) Oral two times a day  tamsulosin 0.4 milliGRAM(s) Oral at bedtime      Physical Exam  General: Patient comfortable in bed  Vital Signs Last 12 Hrs  T(F): 97.6 (08-24-21 @ 13:15), Max: 97.9 (08-24-21 @ 05:15)  HR: 71 (08-24-21 @ 13:15) (71 - 71)  BP: 112/57 (08-24-21 @ 13:15) (106/67 - 112/57)  BP(mean): --  RR: 18 (08-24-21 @ 13:15) (18 - 18)  SpO2: 100% (08-24-21 @ 13:15) (98% - 100%)  Neck: No palpable thyroid nodules.  CVS: S1S2, No murmurs  Respiratory: No wheezing, no crepitations  GI: Abdomen soft, bowel sounds positive  Musculoskeletal:  edema lower extremities.   Skin: No skin rashes, no ecchymosis    Diagnostics             Chief complaint  Patient is a 65y old  Male who presents with a chief complaint of CAD (24 Aug 2021 13:56)   Review of systems  Patient awake in chair, looks comfortable, no hypoglycemic episodes.    Labs and Fingersticks  CAPILLARY BLOOD GLUCOSE      POCT Blood Glucose.: 123 mg/dL (24 Aug 2021 11:42)  POCT Blood Glucose.: 159 mg/dL (24 Aug 2021 07:21)  POCT Blood Glucose.: 159 mg/dL (24 Aug 2021 06:53)  POCT Blood Glucose.: 283 mg/dL (23 Aug 2021 21:38)  POCT Blood Glucose.: 205 mg/dL (23 Aug 2021 16:25)      Anion Gap, Serum: 11 (08-24 @ 05:35)  Anion Gap, Serum: 13 (08-23 @ 04:06)      Calcium, Total Serum: 9.3 (08-24 @ 05:35)  Calcium, Total Serum: 9.1 (08-23 @ 04:06)          08-24    135  |  107  |  25<H>  ----------------------------<  183<H>  3.7   |  17<L>  |  0.88    Ca    9.3      24 Aug 2021 05:35                          13.4   4.19  )-----------( 277      ( 24 Aug 2021 05:35 )             40.6     Medications  MEDICATIONS  (STANDING):  aspirin enteric coated 81 milliGRAM(s) Oral daily  cefuroxime  IVPB 1500 milliGRAM(s) IV Intermittent once  cefuroxime  IVPB 1500 milliGRAM(s) IV Intermittent once  chlorhexidine 0.12% Liquid 10 milliLiter(s) Swish and Spit once  chlorhexidine 4% Liquid 1 Application(s) Topical once  chlorhexidine 4% Liquid 1 Application(s) Topical once  dextrose 40% Gel 15 Gram(s) Oral once  dextrose 50% Injectable 25 Gram(s) IV Push once  enoxaparin Injectable 80 milliGRAM(s) SubCutaneous two times a day  finasteride 5 milliGRAM(s) Oral daily  gabapentin 300 milliGRAM(s) Oral three times a day  gemfibrozil 600 milliGRAM(s) Oral two times a day  glucagon  Injectable 1 milliGRAM(s) IntraMuscular once  insulin glargine Injectable (LANTUS) 18 Unit(s) SubCutaneous at bedtime  insulin lispro (ADMELOG) corrective regimen sliding scale   SubCutaneous three times a day before meals  insulin lispro (ADMELOG) corrective regimen sliding scale   SubCutaneous at bedtime  insulin lispro Injectable (ADMELOG) 8 Unit(s) SubCutaneous three times a day before meals  metoprolol tartrate 25 milliGRAM(s) Oral two times a day  tamsulosin 0.4 milliGRAM(s) Oral at bedtime      Physical Exam  General: Patient comfortable in bed  Vital Signs Last 12 Hrs  T(F): 97.6 (08-24-21 @ 13:15), Max: 97.9 (08-24-21 @ 05:15)  HR: 71 (08-24-21 @ 13:15) (71 - 71)  BP: 112/57 (08-24-21 @ 13:15) (106/67 - 112/57)  BP(mean): --  RR: 18 (08-24-21 @ 13:15) (18 - 18)  SpO2: 100% (08-24-21 @ 13:15) (98% - 100%)  Neck: No palpable thyroid nodules.  CVS: S1S2, No murmurs  Respiratory: No wheezing, no crepitations  GI: Abdomen soft, bowel sounds positive  Musculoskeletal:  edema lower extremities.   Skin: No skin rashes, no ecchymosis    Diagnostics

## 2021-08-24 NOTE — PROGRESS NOTE ADULT - SUBJECTIVE AND OBJECTIVE BOX
Patient discussed on morning rounds with Dr. Valerio    Operation / Date: Pre-op for CAD    SUBJECTIVE ASSESSMENT:  Patient denies any CP or SOB, no other c/o today.  He is up and ambulating.  Tolerates PO diet.  Likely going for surgery tomorrow pending P2Y12.          Vital Signs Last 24 Hrs  T(C): 36.4 (24 Aug 2021 13:15), Max: 37 (23 Aug 2021 18:52)  T(F): 97.6 (24 Aug 2021 13:15), Max: 98.6 (23 Aug 2021 18:52)  HR: 71 (24 Aug 2021 13:15) (71 - 73)  BP: 112/57 (24 Aug 2021 13:15) (106/67 - 118/68)  BP(mean): --  RR: 18 (24 Aug 2021 13:15) (18 - 18)  SpO2: 100% (24 Aug 2021 13:15) (98% - 100%)  I&O's Detail    23 Aug 2021 07:01  -  24 Aug 2021 07:00  --------------------------------------------------------  IN:    Oral Fluid: 820 mL  Total IN: 820 mL    OUT:    Voided (mL): 2250 mL  Total OUT: 2250 mL    Total NET: -1430 mL      24 Aug 2021 07:01  -  24 Aug 2021 13:57  --------------------------------------------------------  IN:    Oral Fluid: 480 mL  Total IN: 480 mL    OUT:  Total OUT: 0 mL    Total NET: 480 mL      PHYSICAL EXAM:  GEN: NAD, looks comfortable  Psych: Mood appropriate  Neuro: A&Ox3.  No focal deficits.  Moving all extremities.   HEENT: No obvious abnormalities  CV: S1S2, regular, no murmurs appreciated.  No carotid bruits.  No JVD  Lungs: Clear B/L.  No wheezing, rales or rhonchi  ABD: Soft, non-tender, non-distended.  +Bowel sounds  EXT: Warm and well perfused.  No peripheral edema noted  Musculoskeletal: Moving all extremities with normal ROM, no joint swelling  PV: Pedal pulses palpable      LABS:                        13.4   4.19  )-----------( 277      ( 24 Aug 2021 05:35 )             40.6           08-24    135  |  107  |  25<H>  ----------------------------<  183<H>  3.7   |  17<L>  |  0.88    Ca    9.3      24 Aug 2021 05:35            MEDICATIONS  (STANDING):  aspirin enteric coated 81 milliGRAM(s) Oral daily  cefuroxime  IVPB 1500 milliGRAM(s) IV Intermittent once  dextrose 40% Gel 15 Gram(s) Oral once  dextrose 50% Injectable 25 Gram(s) IV Push once  enoxaparin Injectable 80 milliGRAM(s) SubCutaneous two times a day  finasteride 5 milliGRAM(s) Oral daily  gabapentin 300 milliGRAM(s) Oral three times a day  gemfibrozil 600 milliGRAM(s) Oral two times a day  glucagon  Injectable 1 milliGRAM(s) IntraMuscular once  insulin glargine Injectable (LANTUS) 18 Unit(s) SubCutaneous at bedtime  insulin lispro (ADMELOG) corrective regimen sliding scale   SubCutaneous three times a day before meals  insulin lispro (ADMELOG) corrective regimen sliding scale   SubCutaneous at bedtime  insulin lispro Injectable (ADMELOG) 8 Unit(s) SubCutaneous three times a day before meals  metoprolol tartrate 25 milliGRAM(s) Oral two times a day  tamsulosin 0.4 milliGRAM(s) Oral at bedtime    MEDICATIONS  (PRN):        RADIOLOGY & ADDITIONAL TESTS:    < from: Xray Chest 1 View- PORTABLE-Urgent (Xray Chest 1 View- PORTABLE-Urgent .) (08.20.21 @ 18:48) >    IMPRESSION:  No active pulmonary disease.    Thank you for the courtesy of this referral.    < end of copied text >

## 2021-08-25 ENCOUNTER — APPOINTMENT (OUTPATIENT)
Dept: CARDIOTHORACIC SURGERY | Facility: HOSPITAL | Age: 66
End: 2021-08-25

## 2021-08-25 PROBLEM — E11.9 TYPE 2 DIABETES MELLITUS WITHOUT COMPLICATIONS: Chronic | Status: ACTIVE | Noted: 2021-08-20

## 2021-08-25 PROBLEM — E78.5 HYPERLIPIDEMIA, UNSPECIFIED: Chronic | Status: ACTIVE | Noted: 2021-08-20

## 2021-08-25 PROBLEM — I10 ESSENTIAL (PRIMARY) HYPERTENSION: Chronic | Status: ACTIVE | Noted: 2021-08-20

## 2021-08-25 PROBLEM — N40.0 BENIGN PROSTATIC HYPERPLASIA WITHOUT LOWER URINARY TRACT SYMPTOMS: Chronic | Status: ACTIVE | Noted: 2021-08-20

## 2021-08-25 LAB
ANION GAP SERPL CALC-SCNC: 9 MMOL/L — SIGNIFICANT CHANGE UP (ref 5–17)
BUN SERPL-MCNC: 23 MG/DL — SIGNIFICANT CHANGE UP (ref 7–23)
CALCIUM SERPL-MCNC: 8.8 MG/DL — SIGNIFICANT CHANGE UP (ref 8.4–10.5)
CHLORIDE SERPL-SCNC: 106 MMOL/L — SIGNIFICANT CHANGE UP (ref 96–108)
CO2 SERPL-SCNC: 19 MMOL/L — LOW (ref 22–31)
CREAT SERPL-MCNC: 0.9 MG/DL — SIGNIFICANT CHANGE UP (ref 0.5–1.3)
GLUCOSE BLDC GLUCOMTR-MCNC: 112 MG/DL — HIGH (ref 70–99)
GLUCOSE BLDC GLUCOMTR-MCNC: 129 MG/DL — HIGH (ref 70–99)
GLUCOSE BLDC GLUCOMTR-MCNC: 132 MG/DL — HIGH (ref 70–99)
GLUCOSE BLDC GLUCOMTR-MCNC: 155 MG/DL — HIGH (ref 70–99)
GLUCOSE BLDC GLUCOMTR-MCNC: 172 MG/DL — HIGH (ref 70–99)
GLUCOSE BLDC GLUCOMTR-MCNC: 177 MG/DL — HIGH (ref 70–99)
GLUCOSE BLDC GLUCOMTR-MCNC: 188 MG/DL — HIGH (ref 70–99)
GLUCOSE BLDC GLUCOMTR-MCNC: 203 MG/DL — HIGH (ref 70–99)
GLUCOSE BLDC GLUCOMTR-MCNC: 259 MG/DL — HIGH (ref 70–99)
GLUCOSE BLDC GLUCOMTR-MCNC: 276 MG/DL — HIGH (ref 70–99)
GLUCOSE BLDC GLUCOMTR-MCNC: 72 MG/DL — SIGNIFICANT CHANGE UP (ref 70–99)
GLUCOSE BLDC GLUCOMTR-MCNC: 76 MG/DL — SIGNIFICANT CHANGE UP (ref 70–99)
GLUCOSE BLDC GLUCOMTR-MCNC: 92 MG/DL — SIGNIFICANT CHANGE UP (ref 70–99)
GLUCOSE SERPL-MCNC: 313 MG/DL — HIGH (ref 70–99)
HCT VFR BLD CALC: 41.4 % — SIGNIFICANT CHANGE UP (ref 39–50)
HGB BLD-MCNC: 13.4 G/DL — SIGNIFICANT CHANGE UP (ref 13–17)
MCHC RBC-ENTMCNC: 31.6 PG — SIGNIFICANT CHANGE UP (ref 27–34)
MCHC RBC-ENTMCNC: 32.4 GM/DL — SIGNIFICANT CHANGE UP (ref 32–36)
MCV RBC AUTO: 97.6 FL — SIGNIFICANT CHANGE UP (ref 80–100)
NRBC # BLD: 0 /100 WBCS — SIGNIFICANT CHANGE UP (ref 0–0)
PA ADP PRP-ACNC: 164 PRU — LOW (ref 194–417)
PLATELET # BLD AUTO: 281 K/UL — SIGNIFICANT CHANGE UP (ref 150–400)
POTASSIUM SERPL-MCNC: 4.3 MMOL/L — SIGNIFICANT CHANGE UP (ref 3.5–5.3)
POTASSIUM SERPL-SCNC: 4.3 MMOL/L — SIGNIFICANT CHANGE UP (ref 3.5–5.3)
RBC # BLD: 4.24 M/UL — SIGNIFICANT CHANGE UP (ref 4.2–5.8)
RBC # FLD: 12.3 % — SIGNIFICANT CHANGE UP (ref 10.3–14.5)
SARS-COV-2 RNA SPEC QL NAA+PROBE: SIGNIFICANT CHANGE UP
SODIUM SERPL-SCNC: 134 MMOL/L — LOW (ref 135–145)
WBC # BLD: 5.12 K/UL — SIGNIFICANT CHANGE UP (ref 3.8–10.5)
WBC # FLD AUTO: 5.12 K/UL — SIGNIFICANT CHANGE UP (ref 3.8–10.5)

## 2021-08-25 RX ORDER — CHLORHEXIDINE GLUCONATE 213 G/1000ML
30 SOLUTION TOPICAL ONCE
Refills: 0 | Status: DISCONTINUED | OUTPATIENT
Start: 2021-08-25 | End: 2021-08-25

## 2021-08-25 RX ORDER — ENOXAPARIN SODIUM 100 MG/ML
80 INJECTION SUBCUTANEOUS
Refills: 0 | Status: DISCONTINUED | OUTPATIENT
Start: 2021-08-26 | End: 2021-08-26

## 2021-08-25 RX ORDER — ENOXAPARIN SODIUM 100 MG/ML
80 INJECTION SUBCUTANEOUS
Refills: 0 | Status: DISCONTINUED | OUTPATIENT
Start: 2021-08-25 | End: 2021-08-25

## 2021-08-25 RX ORDER — DEXTROSE 50 % IN WATER 50 %
25 SYRINGE (ML) INTRAVENOUS
Refills: 0 | Status: DISCONTINUED | OUTPATIENT
Start: 2021-08-25 | End: 2021-08-27

## 2021-08-25 RX ORDER — DEXTROSE 50 % IN WATER 50 %
50 SYRINGE (ML) INTRAVENOUS
Refills: 0 | Status: DISCONTINUED | OUTPATIENT
Start: 2021-08-25 | End: 2021-08-27

## 2021-08-25 RX ORDER — INSULIN HUMAN 100 [IU]/ML
4 INJECTION, SOLUTION SUBCUTANEOUS
Qty: 100 | Refills: 0 | Status: DISCONTINUED | OUTPATIENT
Start: 2021-08-25 | End: 2021-08-27

## 2021-08-25 RX ORDER — INSULIN HUMAN 100 [IU]/ML
4 INJECTION, SOLUTION SUBCUTANEOUS
Qty: 250 | Refills: 0 | Status: DISCONTINUED | OUTPATIENT
Start: 2021-08-25 | End: 2021-08-25

## 2021-08-25 RX ORDER — INSULIN LISPRO 100/ML
10 VIAL (ML) SUBCUTANEOUS
Refills: 0 | Status: DISCONTINUED | OUTPATIENT
Start: 2021-08-25 | End: 2021-08-26

## 2021-08-25 RX ORDER — CHLORHEXIDINE GLUCONATE 213 G/1000ML
1 SOLUTION TOPICAL ONCE
Refills: 0 | Status: DISCONTINUED | OUTPATIENT
Start: 2021-08-25 | End: 2021-08-25

## 2021-08-25 RX ADMIN — Medication 600 MILLIGRAM(S): at 06:22

## 2021-08-25 RX ADMIN — INSULIN HUMAN 4 UNIT(S)/HR: 100 INJECTION, SOLUTION SUBCUTANEOUS at 14:20

## 2021-08-25 RX ADMIN — GABAPENTIN 300 MILLIGRAM(S): 400 CAPSULE ORAL at 14:20

## 2021-08-25 RX ADMIN — ENOXAPARIN SODIUM 80 MILLIGRAM(S): 100 INJECTION SUBCUTANEOUS at 06:21

## 2021-08-25 RX ADMIN — Medication 8 UNIT(S): at 08:10

## 2021-08-25 RX ADMIN — Medication 25 MILLIGRAM(S): at 06:22

## 2021-08-25 RX ADMIN — FINASTERIDE 5 MILLIGRAM(S): 5 TABLET, FILM COATED ORAL at 11:57

## 2021-08-25 RX ADMIN — Medication 3: at 11:57

## 2021-08-25 RX ADMIN — GABAPENTIN 300 MILLIGRAM(S): 400 CAPSULE ORAL at 21:53

## 2021-08-25 RX ADMIN — Medication 81 MILLIGRAM(S): at 11:56

## 2021-08-25 RX ADMIN — GABAPENTIN 300 MILLIGRAM(S): 400 CAPSULE ORAL at 06:22

## 2021-08-25 RX ADMIN — TAMSULOSIN HYDROCHLORIDE 0.4 MILLIGRAM(S): 0.4 CAPSULE ORAL at 21:53

## 2021-08-25 RX ADMIN — Medication 8 UNIT(S): at 11:57

## 2021-08-25 RX ADMIN — INSULIN GLARGINE 18 UNIT(S): 100 INJECTION, SOLUTION SUBCUTANEOUS at 21:54

## 2021-08-25 RX ADMIN — Medication 600 MILLIGRAM(S): at 17:28

## 2021-08-25 RX ADMIN — Medication 25 MILLIGRAM(S): at 17:29

## 2021-08-25 RX ADMIN — ENOXAPARIN SODIUM 80 MILLIGRAM(S): 100 INJECTION SUBCUTANEOUS at 17:29

## 2021-08-25 NOTE — PROGRESS NOTE ADULT - ASSESSMENT
Assessment  DMT2: 65y Male with DM T2 with hyperglycemia, A1C 7.1%, was on several oral meds at home, now on basal bolus insulin, increased dose yesterday, blood sugars in acceptable range this AM and running high postprandially, not at preop target, no hypoglycemias.  Patient is eating meals, noncompliant with low-carb diet, appears comfortable, planning CABG tomorrow.  CAD: on medications, no chest pain, stable, monitored.  HTN: Controlled,  on antihypertensive medications.  Obesity: No strict exercise routines, not on any weight loss program, neither on low calorie diet.        Cb Hill MD  Cell: [phone]  Office: [phone]     Assessment  DMT2: 65y Male with DM T2 with hyperglycemia, A1C 7.1%, was on several oral meds at home, now on basal bolus insulin, increased  dose yesterday, blood sugars in acceptable range this AM and running high postprandially, not at preop target, no hypoglycemias.  Patient is eating meals, noncompliant with low-carb diet, appears comfortable, planning CABG tomorrow.  CAD: on medications, no chest pain, stable, monitored.  HTN: Controlled,  on antihypertensive medications.  Obesity: No strict exercise routines, not on any weight loss program, neither on low calorie diet.        Cb Hill MD  Cell: [phone]  Office: [phone]

## 2021-08-25 NOTE — PROGRESS NOTE ADULT - PROBLEM SELECTOR PLAN 1
Pre op Cardiac surgery work up completed   Continue with ASA 81 mg PO daily   Continue with Lopressor 25 mg PO BID   Continue on gemfibrozil 600mg PO two times a day  NPO after midnight   Type and Screen x 1   Hibiclens Shower tonight at 8 pm   Plan for Cardiac Surgery tomorrow 8/26 with Dr. Valerio

## 2021-08-25 NOTE — PROGRESS NOTE ADULT - SUBJECTIVE AND OBJECTIVE BOX
Chief complaint  Patient is a 65y old  Male who presents with a chief complaint of CAD (24 Aug 2021 13:56)   Review of systems  Patient awake in chair, looks comfortable, no hypoglycemic episodes.    Labs and Fingersticks  CAPILLARY BLOOD GLUCOSE      POCT Blood Glucose.: 259 mg/dL (25 Aug 2021 14:12)  POCT Blood Glucose.: 276 mg/dL (25 Aug 2021 11:41)  POCT Blood Glucose.: 112 mg/dL (25 Aug 2021 08:01)  POCT Blood Glucose.: 172 mg/dL (24 Aug 2021 22:14)  POCT Blood Glucose.: 156 mg/dL (24 Aug 2021 16:25)      Anion Gap, Serum: 9 (08-25 @ 10:18)  Anion Gap, Serum: 11 (08-24 @ 05:35)      Calcium, Total Serum: 8.8 (08-25 @ 10:18)  Calcium, Total Serum: 9.3 (08-24 @ 05:35)          08-25    134<L>  |  106  |  23  ----------------------------<  313<H>  4.3   |  19<L>  |  0.90    Ca    8.8      25 Aug 2021 10:18                          13.4   5.12  )-----------( 281      ( 25 Aug 2021 12:23 )             41.4     Medications  MEDICATIONS  (STANDING):  aspirin enteric coated 81 milliGRAM(s) Oral daily  cefuroxime  IVPB 1500 milliGRAM(s) IV Intermittent once  chlorhexidine 0.12% Liquid 10 milliLiter(s) Swish and Spit once  dextrose 40% Gel 15 Gram(s) Oral once  dextrose 50% Injectable 25 Gram(s) IV Push once  dextrose 50% Injectable 25 milliLiter(s) IV Push every 15 minutes  dextrose 50% Injectable 50 milliLiter(s) IV Push every 15 minutes  dextrose 50% Injectable 25 milliLiter(s) IV Push every 15 minutes  enoxaparin Injectable 80 milliGRAM(s) SubCutaneous two times a day  finasteride 5 milliGRAM(s) Oral daily  gabapentin 300 milliGRAM(s) Oral three times a day  gemfibrozil 600 milliGRAM(s) Oral two times a day  glucagon  Injectable 1 milliGRAM(s) IntraMuscular once  insulin glargine Injectable (LANTUS) 18 Unit(s) SubCutaneous at bedtime  insulin lispro (ADMELOG) corrective regimen sliding scale   SubCutaneous three times a day before meals  insulin lispro (ADMELOG) corrective regimen sliding scale   SubCutaneous at bedtime  insulin lispro Injectable (ADMELOG) 10 Unit(s) SubCutaneous three times a day before meals  insulin regular Infusion 4 Unit(s)/Hr (4 mL/Hr) IV Continuous <Continuous>  metoprolol tartrate 25 milliGRAM(s) Oral two times a day  tamsulosin 0.4 milliGRAM(s) Oral at bedtime      Physical Exam  General: Patient comfortable in bed  Vital Signs Last 12 Hrs  T(F): 97.9 (08-25-21 @ 13:03), Max: 98 (08-25-21 @ 04:45)  HR: 75 (08-25-21 @ 13:03) (67 - 75)  BP: 135/79 (08-25-21 @ 13:03) (134/78 - 135/79)  BP(mean): --  RR: 18 (08-25-21 @ 13:03) (18 - 18)  SpO2: 100% (08-25-21 @ 13:03) (99% - 100%)  Neck: No palpable thyroid nodules.  CVS: S1S2, No murmurs  Respiratory: No wheezing, no crepitations  GI: Abdomen soft, bowel sounds positive  Musculoskeletal:  edema lower extremities.   Skin: No skin rashes, no ecchymosis    Diagnostics             Chief complaint  Patient is a 65y old  Male who presents with a chief complaint of CAD (24 Aug 2021 13:56)   Review of systems  Patient awake in chair, looks comfortable, no hypoglycemic episodes.    Labs and Fingersticks  CAPILLARY BLOOD GLUCOSE      POCT Blood Glucose.: 259 mg/dL (25 Aug 2021 14:12)  POCT Blood Glucose.: 276 mg/dL (25 Aug 2021 11:41)  POCT Blood Glucose.: 112 mg/dL (25 Aug 2021 08:01)  POCT Blood Glucose.: 172 mg/dL (24 Aug 2021 22:14)  POCT Blood Glucose.: 156 mg/dL (24 Aug 2021 16:25)      Anion Gap, Serum: 9 (08-25 @ 10:18)  Anion Gap, Serum: 11 (08-24 @ 05:35)      Calcium, Total Serum: 8.8 (08-25 @ 10:18)  Calcium, Total Serum: 9.3 (08-24 @ 05:35)          08-25    134<L>  |  106  |  23  ----------------------------<  313<H>  4.3   |  19<L>  |  0.90    Ca    8.8      25 Aug 2021 10:18                          13.4   5.12  )-----------( 281      ( 25 Aug 2021 12:23 )             41.4     Medications  MEDICATIONS  (STANDING):  aspirin enteric coated 81 milliGRAM(s) Oral daily  cefuroxime  IVPB 1500 milliGRAM(s) IV Intermittent once  chlorhexidine 0.12% Liquid 10 milliLiter(s) Swish and Spit once  dextrose 40% Gel 15 Gram(s) Oral once  dextrose 50% Injectable 25 Gram(s) IV Push once  dextrose 50% Injectable 25 milliLiter(s) IV Push every 15 minutes  dextrose 50% Injectable 50 milliLiter(s) IV Push every 15 minutes  dextrose 50% Injectable 25 milliLiter(s) IV Push every 15 minutes  enoxaparin Injectable 80 milliGRAM(s) SubCutaneous two times a day  finasteride 5 milliGRAM(s) Oral daily  gabapentin 300 milliGRAM(s) Oral three times a day  gemfibrozil 600 milliGRAM(s) Oral two times a day  glucagon  Injectable 1 milliGRAM(s) IntraMuscular once  insulin glargine Injectable (LANTUS) 18 Unit(s) SubCutaneous at bedtime  insulin lispro (ADMELOG) corrective regimen sliding scale   SubCutaneous three times a day before meals  insulin lispro (ADMELOG) corrective regimen sliding scale   SubCutaneous at bedtime  insulin lispro Injectable (ADMELOG) 10 Unit(s) SubCutaneous three times a day before meals  insulin regular Infusion 4 Unit(s)/Hr (4 mL/Hr) IV Continuous <Continuous>  metoprolol tartrate 25 milliGRAM(s) Oral two times a day  tamsulosin 0.4 milliGRAM(s) Oral at bedtime      Physical Exam  General: Patient comfortable in bed  Vital Signs Last 12 Hrs  T(F): 97.9 (08-25-21 @ 13:03), Max: 98 (08-25-21 @ 04:45)  HR: 75 (08-25-21 @ 13:03) (67 - 75)  BP: 135/79 (08-25-21 @ 13:03) (134/78 - 135/79)  BP(mean): --  RR: 18 (08-25-21 @ 13:03) (18 - 18)  SpO2: 100% (08-25-21 @ 13:03) (99% - 100%)  Neck: No palpable thyroid nodules.  CVS: S1S2, No murmurs  Respiratory: No wheezing, no crepitations  GI: Abdomen soft, bowel sounds positive  Musculoskeletal:  edema lower extremities.   Skin: No skin rashes, no ecchymosis    Diagnostics

## 2021-08-25 NOTE — PROGRESS NOTE ADULT - SUBJECTIVE AND OBJECTIVE BOX
Cardiac Surgery Pre-op Note:    CC: Patient is a 65y old  Male who presents with a chief complaint of CAD (24 Aug 2021 13:56)    Referring Physician: Dr. Celestin                                                                                                            Surgeon: Dr. Deep Valerio     Procedure: (21) (CABG)    Allergies    sulfa drugs (Hives)    Intolerances    HPI:  65 year old male (denies implanted devices) with significant PMHx of T2DM (last HgbA1c 8.1, managed by PCP, complicated by peripheral neuropathy), HLD, HTN, BPH presents for RHC/LHC. Patient c/o chest discomfort with exertion that has been becoming progressively worse and resolves with rest associated with SOB. Chest pain resolves with rest. Patient recently went to [facility] ER on  and patient states results were normal. Patient seen and evaluated by Dr. Celestin and presents for further evaluation.     Patient currently denies chest pian, palpitations, orthopnea or PND.  (20 Aug 2021 08:17)    PAST MEDICAL & SURGICAL HISTORY:  T2DM (type 2 diabetes mellitus)  c/b peripheral neuropathy    HTN (hypertension)    HLD (hyperlipidemia)    BPH (benign prostatic hyperplasia)    H/O shoulder surgery  right    H/O arthroscopy of knee    S/P arthroscopy of right knee    MEDICATIONS  (STANDING):  aspirin enteric coated 81 milliGRAM(s) Oral daily  cefuroxime IVPB 1500 milliGRAM(s) IV Intermittent once  chlorhexidine 0.12% Liquid 10 milliLiter(s) Swish and Spit once  chlorhexidine 0.12% Liquid 30 milliLiter(s) Swish and Spit once  chlorhexidine 4% Liquid 1 Application(s) Topical once  dextrose 40% Gel 15 Gram(s) Oral once  dextrose 50% Injectable 25 Gram(s) IV Push once  dextrose 50% Injectable 25 milliLiter(s) IV Push every 15 minutes  dextrose 50% Injectable 50 milliLiter(s) IV Push every 15 minutes  dextrose 50% Injectable 25 milliLiter(s) IV Push every 15 minutes  finasteride 5 milliGRAM(s) Oral daily  gabapentin 300 milliGRAM(s) Oral three times a day  gemfibrozil 600 milliGRAM(s) Oral two times a day  glucagon  Injectable 1 milliGRAM(s) IntraMuscular once  insulin glargine Injectable (LANTUS) 18 Unit(s) SubCutaneous at bedtime  insulin lispro (ADMELOG) corrective regimen sliding scale SubCutaneous three times a day before meals  insulin lispro (ADMELOG) corrective regimen sliding scale SubCutaneous at bedtime  insulin lispro Injectable (ADMELOG) 10 Unit(s) SubCutaneous three times a day before meals  insulin regular Infusion 4 Unit(s)/Hr (4 mL/Hr) IV Continuous <Continuous>  metoprolol tartrate 25 milliGRAM(s) Oral two times a day  tamsulosin 0.4 milliGRAM(s) Oral at bedtime    MEDICATIONS  (PRN):    Vital Signs Last 24 Hrs  T(C): 36.7 (21 @ 15:04), Max: 36.8 (21 @ 19:00)  T(F): 98 (21 @ 15:04), Max: 98.2 (21 @ 19:00)  HR: 82 (21 @ 17:24) (63 - 82)  BP: 137/63 (21 @ 17:24) (113/66 - 137/63)  RR: 19 (21 @ 17:24) (18 - 19)  SpO2: 97% (21 @ 17:24) (97% - 100%)          ABO Interpretation: O ( @ 17:38)     Daily     Daily Weight in k (25 Aug 2021 07:52)  Admit Wt: Drug Dosing Weight      Labs:                        13.4   5.12  )-----------( 281      ( 25 Aug 2021 12:23 )             41.4         134<L>  |  106  |  23  ----------------------------<  313<H>  4.3   |  19<L>  |  0.90    Ca    8.8      25 Aug 2021 10:18    P2Y12: P2Y12 Plt Reactivity: 164 PRU (21 @ 03:48)  P2Y12 Plt Reactivity: 151 PRU (21 @ 17:42)  P2Y12 Plt Reactivity: 93 PRU (21 @ 04:06)      Blood Type: ABO Interpretation: O ( @ 17:38)    HGB A1C: 7.1  Prealbumin:   Pro-BNP: 175  Thyroid Panel:  @ 09:33/--  1.3/--/--   @ 02:11/0.79  --/--/75   @ 15:51/1.09  --/.    MRSA: MRSA PCR Result.: NotDetec ( @ 01:23)   / MSSA:     CXR: < from: Xray Chest 1 View- PORTABLE-Urgent (Xray Chest 1 View- PORTABLE-Urgent .) (21 @ 18:48) >  Frontal expiratory view of the chest shows the heart to be normal in size. The lungs are clear and there is no evidence of pneumothorax nor pleural effusion.    IMPRESSION:  No active pulmonary disease.    EKG: < from: 12 Lead ECG (21 @ 08:) >    Ventricular Rate 66 BPM    Atrial Rate 66 BPM    P-R Interval 150 ms    QRS Duration 76 ms    Q-T Interval 364 ms    QTC Calculation(Bazett) 381 ms    P Axis 51 degrees    R Axis 37 degrees    T Axis 1 degrees    Diagnosis Line NORMAL SINUS RHYTHM  NORMAL ECG    Carotid Duplex:  < from: VA Duplex Carotid, Bilat (21 @ 10:13) IMPRESSION: No evidence of hemodynamically significant stenosis in the bilateral internal carotid arteries.    Echocardiogram: < from: TTE with Doppler (w/Cont) (21 @ 15:34) >  Observations:  Mitral Valve: Mitral annular calcification. Mild mitral  regurgitation.  Aortic Valve/Aorta: Calcified trileaflet aortic valve with  normal opening. Minimal aortic regurgitation.  Normal aortic root size.  Left Atrium: Normal left atrium.  Left Ventricle: Endocardial visualization enhanced with  intravenous injection of Ultrasonic Enhancing Agent  (Definity).  Normal left ventricular internal dimensions and wall  thickness.  Estimated ejection fraction 60%.  Akinesis of the basal inferior and basal inferoseptal  segments.  Left ventricular filling pressure is normal.  Right Heart: Normal right atrium. Normal right ventricular  size and function.  Normal tricuspid valve. Normal pulmonic valve.  Pericardium/Pleura: Normal pericardium with no pericardial  effusion.  Hemodynamic: No evidence of pulmonary hypertension.  ------------------------------------------------------------------------  Conclusions:  Endocardial visualization enhanced with intravenous  injection of Ultrasonic Enhancing Agent (Definity).  Normal left ventricular internal dimensions and wall  thickness.  Estimated ejection fraction 60%. Akinesis of the basal  inferior and basal inferoseptal segments.    Cardiac catheterization: < from: Cardiac Cath Lab (21 @ 09:50) >  VENTRICLES: EF estimated was 60 %.  CORONARY VESSELS: The coronary circulation is right dominant.  LM: --  Proximal left main: There was a 50 % stenosis.  LAD:   --  Proximal LAD: There was a 95 % stenosis.  CX:   --  Proximal circumflex: There was a 95 % stenosis.  RCA:   --  Proximal RCA: There was a 95 % stenosis.  COMPLICATIONS: There were no complications.  DIAGNOSTIC RECOMMENDATIONS: Consultation be obtained for coronary artery  bypass grafting.    Gen: WN/WD NAD  Neuro: AAOx3, nonfocal  Pulm: CTA B/L  CV: RRR, S1S2  Abd: Soft, NT, ND +BS  Ext: No edema, + peripheral pulses      Pt has AICD/PPM [] Yes  [X ] No             Brand Name:  Pre-op Beta Blocker ordered within 24 hrs of surgery (CABG ONLY)?  [ X] Yes  [ ] No  If not, Why?  Type & Cross  [X] Yes  [ ] No  NPO after Midnight [X ] Yes  [ ] No  Pre-op ABX ordered, to be taped on chart:  [X ] Yes  [ ] No     Hibiclens/Peridex ordered [X ] Yes  [ ] No  Intraop on Hold:  IJ [X ]   Consent obtained: [X ] Yes  [ ] No

## 2021-08-25 NOTE — PROGRESS NOTE ADULT - ASSESSMENT
64 yo Male (denies implanted devices) with significant PMHx of T2DM (last HgbA1c 8.1, managed by PCP, complicated by peripheral neuropathy), HLD, HTN, BPH presented for RHC/LHC. Patient c/o chest discomfort with exertion that has been becoming progressively worse and resolves with rest associated with SOB.  Patient recently went to [facility] ER on 7/29 and patient states results were normal. Patient seen and evaluated by Dr. Celestin and presented for further evaluation and cardiac Cath.  Cath finding revealed: proximal left main 50 %, proximal LAD 95 % stenosis, proximal circumflex: 95 % and proximal RCA: 95 % stenosis. CTS consult called to evaluate patient for cardiac surgery with Dr. Deep Valerio.   8/22 P2Y12 80. Continue with BBlocker, statin asa. CABG tentatively scheduled in am pending repeat p2y12 in am 0400  8/23 CABG postponed P2Y12> 92 this am  Endo consulted elevated BS, Start statin (lopid at home)   ASA betablocker CAD Recheck P2Y12 am  8/24 P2Y12 @ 5pm-->   T&S to be sent at 5pm as well.   8/25 VVS; Hyperglycemia --> BFG > 200.  Transferred to SDU for Insulin gtt.  NPO after midnight.

## 2021-08-26 LAB
ANION GAP SERPL CALC-SCNC: 13 MMOL/L — SIGNIFICANT CHANGE UP (ref 5–17)
BUN SERPL-MCNC: 24 MG/DL — HIGH (ref 7–23)
CALCIUM SERPL-MCNC: 8.9 MG/DL — SIGNIFICANT CHANGE UP (ref 8.4–10.5)
CHLORIDE SERPL-SCNC: 107 MMOL/L — SIGNIFICANT CHANGE UP (ref 96–108)
CO2 SERPL-SCNC: 17 MMOL/L — LOW (ref 22–31)
CREAT SERPL-MCNC: 0.84 MG/DL — SIGNIFICANT CHANGE UP (ref 0.5–1.3)
GLUCOSE BLDC GLUCOMTR-MCNC: 128 MG/DL — HIGH (ref 70–99)
GLUCOSE BLDC GLUCOMTR-MCNC: 170 MG/DL — HIGH (ref 70–99)
GLUCOSE BLDC GLUCOMTR-MCNC: 171 MG/DL — HIGH (ref 70–99)
GLUCOSE BLDC GLUCOMTR-MCNC: 200 MG/DL — HIGH (ref 70–99)
GLUCOSE BLDC GLUCOMTR-MCNC: 214 MG/DL — HIGH (ref 70–99)
GLUCOSE BLDC GLUCOMTR-MCNC: 238 MG/DL — HIGH (ref 70–99)
GLUCOSE BLDC GLUCOMTR-MCNC: 248 MG/DL — HIGH (ref 70–99)
GLUCOSE BLDC GLUCOMTR-MCNC: 58 MG/DL — LOW (ref 70–99)
GLUCOSE BLDC GLUCOMTR-MCNC: 70 MG/DL — SIGNIFICANT CHANGE UP (ref 70–99)
GLUCOSE BLDC GLUCOMTR-MCNC: 71 MG/DL — SIGNIFICANT CHANGE UP (ref 70–99)
GLUCOSE BLDC GLUCOMTR-MCNC: 74 MG/DL — SIGNIFICANT CHANGE UP (ref 70–99)
GLUCOSE BLDC GLUCOMTR-MCNC: 75 MG/DL — SIGNIFICANT CHANGE UP (ref 70–99)
GLUCOSE BLDC GLUCOMTR-MCNC: 77 MG/DL — SIGNIFICANT CHANGE UP (ref 70–99)
GLUCOSE BLDC GLUCOMTR-MCNC: 90 MG/DL — SIGNIFICANT CHANGE UP (ref 70–99)
GLUCOSE BLDC GLUCOMTR-MCNC: 93 MG/DL — SIGNIFICANT CHANGE UP (ref 70–99)
GLUCOSE BLDC GLUCOMTR-MCNC: 96 MG/DL — SIGNIFICANT CHANGE UP (ref 70–99)
GLUCOSE BLDC GLUCOMTR-MCNC: 99 MG/DL — SIGNIFICANT CHANGE UP (ref 70–99)
GLUCOSE SERPL-MCNC: 92 MG/DL — SIGNIFICANT CHANGE UP (ref 70–99)
HCT VFR BLD CALC: 40 % — SIGNIFICANT CHANGE UP (ref 39–50)
HGB BLD-MCNC: 13.2 G/DL — SIGNIFICANT CHANGE UP (ref 13–17)
MCHC RBC-ENTMCNC: 31.1 PG — SIGNIFICANT CHANGE UP (ref 27–34)
MCHC RBC-ENTMCNC: 33 GM/DL — SIGNIFICANT CHANGE UP (ref 32–36)
MCV RBC AUTO: 94.1 FL — SIGNIFICANT CHANGE UP (ref 80–100)
NRBC # BLD: 0 /100 WBCS — SIGNIFICANT CHANGE UP (ref 0–0)
PLATELET # BLD AUTO: 273 K/UL — SIGNIFICANT CHANGE UP (ref 150–400)
POTASSIUM SERPL-MCNC: 3.7 MMOL/L — SIGNIFICANT CHANGE UP (ref 3.5–5.3)
POTASSIUM SERPL-SCNC: 3.7 MMOL/L — SIGNIFICANT CHANGE UP (ref 3.5–5.3)
RBC # BLD: 4.25 M/UL — SIGNIFICANT CHANGE UP (ref 4.2–5.8)
RBC # FLD: 12.1 % — SIGNIFICANT CHANGE UP (ref 10.3–14.5)
SARS-COV-2 RNA SPEC QL NAA+PROBE: SIGNIFICANT CHANGE UP
SODIUM SERPL-SCNC: 137 MMOL/L — SIGNIFICANT CHANGE UP (ref 135–145)
WBC # BLD: 4.38 K/UL — SIGNIFICANT CHANGE UP (ref 3.8–10.5)
WBC # FLD AUTO: 4.38 K/UL — SIGNIFICANT CHANGE UP (ref 3.8–10.5)

## 2021-08-26 PROCEDURE — 33518 CABG ARTERY-VEIN TWO: CPT

## 2021-08-26 PROCEDURE — 33533 CABG ARTERIAL SINGLE: CPT

## 2021-08-26 PROCEDURE — 33508 ENDOSCOPIC VEIN HARVEST: CPT | Mod: 59

## 2021-08-26 RX ORDER — INSULIN LISPRO 100/ML
6 VIAL (ML) SUBCUTANEOUS
Refills: 0 | Status: DISCONTINUED | OUTPATIENT
Start: 2021-08-26 | End: 2021-08-26

## 2021-08-26 RX ORDER — POTASSIUM BICARBONATE 978 MG/1
25 TABLET, EFFERVESCENT ORAL ONCE
Refills: 0 | Status: COMPLETED | OUTPATIENT
Start: 2021-08-26 | End: 2021-08-26

## 2021-08-26 RX ORDER — CHLORHEXIDINE GLUCONATE 213 G/1000ML
1 SOLUTION TOPICAL ONCE
Refills: 0 | Status: COMPLETED | OUTPATIENT
Start: 2021-08-26 | End: 2021-08-26

## 2021-08-26 RX ORDER — CHLORHEXIDINE GLUCONATE 213 G/1000ML
1 SOLUTION TOPICAL ONCE
Refills: 0 | Status: COMPLETED | OUTPATIENT
Start: 2021-08-27 | End: 2021-08-27

## 2021-08-26 RX ORDER — CHLORHEXIDINE GLUCONATE 213 G/1000ML
30 SOLUTION TOPICAL ONCE
Refills: 0 | Status: COMPLETED | OUTPATIENT
Start: 2021-08-26 | End: 2021-08-27

## 2021-08-26 RX ADMIN — Medication 6 UNIT(S): at 12:02

## 2021-08-26 RX ADMIN — TAMSULOSIN HYDROCHLORIDE 0.4 MILLIGRAM(S): 0.4 CAPSULE ORAL at 21:48

## 2021-08-26 RX ADMIN — FINASTERIDE 5 MILLIGRAM(S): 5 TABLET, FILM COATED ORAL at 12:24

## 2021-08-26 RX ADMIN — GABAPENTIN 300 MILLIGRAM(S): 400 CAPSULE ORAL at 14:47

## 2021-08-26 RX ADMIN — Medication 25 MILLIGRAM(S): at 05:45

## 2021-08-26 RX ADMIN — GABAPENTIN 300 MILLIGRAM(S): 400 CAPSULE ORAL at 05:45

## 2021-08-26 RX ADMIN — Medication 81 MILLIGRAM(S): at 12:24

## 2021-08-26 RX ADMIN — ENOXAPARIN SODIUM 80 MILLIGRAM(S): 100 INJECTION SUBCUTANEOUS at 05:45

## 2021-08-26 RX ADMIN — Medication 2: at 12:00

## 2021-08-26 RX ADMIN — Medication 600 MILLIGRAM(S): at 05:45

## 2021-08-26 RX ADMIN — Medication 0: at 07:42

## 2021-08-26 RX ADMIN — Medication 600 MILLIGRAM(S): at 17:06

## 2021-08-26 RX ADMIN — GABAPENTIN 300 MILLIGRAM(S): 400 CAPSULE ORAL at 21:48

## 2021-08-26 RX ADMIN — ENOXAPARIN SODIUM 80 MILLIGRAM(S): 100 INJECTION SUBCUTANEOUS at 17:06

## 2021-08-26 RX ADMIN — Medication 25 MILLIGRAM(S): at 17:06

## 2021-08-26 RX ADMIN — CHLORHEXIDINE GLUCONATE 1 APPLICATION(S): 213 SOLUTION TOPICAL at 21:46

## 2021-08-26 RX ADMIN — POTASSIUM BICARBONATE 25 MILLIEQUIVALENT(S): 978 TABLET, EFFERVESCENT ORAL at 07:01

## 2021-08-26 NOTE — PROGRESS NOTE ADULT - SUBJECTIVE AND OBJECTIVE BOX
Cardiac Surgery Pre-op Note:     Surgeon:    Procedure: (Date) (Procedure)    HPI:  65 year old male (denies implanted devices) with significant PMHx of T2DM (last HgbA1c 8.1, managed by PCP, complicated by peripheral neuropathy), HLD, HTN, BPH presents for RHC/LHC. Patient c/o chest discomfort with exertion that has been becoming progressively worse and resolves with rest associated with SOB. Chest pain resolves with rest. Patient recently went to [facility] ER on 7/29 and patient states results were normal. Patient seen and evaluated by Dr. Celestin and presents for further evaluation.     Patient currenly denies chest pian, palpitations, orthopnea or PND.  (20 Aug 2021 08:17)      PAST MEDICAL & SURGICAL HISTORY:  T2DM (type 2 diabetes mellitus)  c/b peripheral neuropathy    HTN (hypertension)    HLD (hyperlipidemia)    BPH (benign prostatic hyperplasia)    H/O shoulder surgery  right    H/O arthroscopy of knee    S/P arthroscopy of right knee        MEDICATIONS  (STANDING):  aspirin enteric coated 81 milliGRAM(s) Oral daily  cefuroxime  IVPB 1500 milliGRAM(s) IV Intermittent once  dextrose 40% Gel 15 Gram(s) Oral once  dextrose 50% Injectable 25 Gram(s) IV Push once  dextrose 50% Injectable 25 milliLiter(s) IV Push every 15 minutes  dextrose 50% Injectable 50 milliLiter(s) IV Push every 15 minutes  dextrose 50% Injectable 25 milliLiter(s) IV Push every 15 minutes  enoxaparin Injectable 80 milliGRAM(s) SubCutaneous two times a day  finasteride 5 milliGRAM(s) Oral daily  gabapentin 300 milliGRAM(s) Oral three times a day  gemfibrozil 600 milliGRAM(s) Oral two times a day  glucagon  Injectable 1 milliGRAM(s) IntraMuscular once  insulin regular Infusion 4 Unit(s)/Hr (4 mL/Hr) IV Continuous <Continuous>  metoprolol tartrate 25 milliGRAM(s) Oral two times a day  tamsulosin 0.4 milliGRAM(s) Oral at bedtime    MEDICATIONS  (PRN):      Vital Signs Last 24 Hrs  T(C): 36.8 (08-26-21 @ 06:48), Max: 36.8 (08-25-21 @ 18:59)  T(F): 98.2 (08-26-21 @ 06:48), Max: 98.2 (08-25-21 @ 18:59)  HR: 59 (08-26-21 @ 08:00) (59 - 82)  BP: 111/60 (08-26-21 @ 08:00) (111/60 - 137/63)  RR: 18 (08-26-21 @ 06:48) (18 - 19)  SpO2: 100% (08-26-21 @ 08:00) (97% - 100%)          ABO Interpretation: O (08-26 @ 05:19)     Daily     Daily   Admit Wt: Drug Dosing Weight      Labs:                        13.2   4.38  )-----------( 273      ( 26 Aug 2021 05:19 )             40.0     08-26    137  |  107  |  24<H>  ----------------------------<  92  3.7   |  17<L>  |  0.84    Ca    8.9      26 Aug 2021 05:19          Blood Type: ABO Interpretation: O (08-26 @ 05:19)    HGB A1C:   Pro-BNP:   Thyroid Panel: 08-21 @ 09:33/--  1.3/--/--  08-21 @ 02:11/0.79  --/--/75  08-20 @ 15:51/1.09  --/5.9/71    MRSA:  / MSSA:     P2Y12    CXR:     Carotid Duplex:      PFT's:    Echocardiogram:    Cardiac catheterization:    PHYSICAL EXAM:  Neuro: A&Ox3, NAD  Pulm: B/L BS CTA  CV: RRR,+S1S2  Abd: Soft, NT/ND +BSX4Q  Ext: B/L LE no edema, +PP, no calf tenderness Cardiac Surgery Pre-op Note:     Surgeon: Teodoro    Procedure:  8/27 cabg    HPI:  65 year old male (denies implanted devices) with significant PMHx of T2DM (last HgbA1c 8.1, managed by PCP, complicated by peripheral neuropathy), HLD, HTN, BPH presents for RHC/LHC. Patient c/o chest discomfort with exertion that has been becoming progressively worse and resolves with rest associated with SOB. Chest pain resolves with rest. Patient recently went to [facility] ER on 7/29 and patient states results were normal. Patient seen and evaluated by Dr. Celestin and presents for further evaluation.     Patient currenly denies chest pian, palpitations, orthopnea or PND.  (20 Aug 2021 08:17)      PAST MEDICAL & SURGICAL HISTORY:  T2DM (type 2 diabetes mellitus)  c/b peripheral neuropathy    HTN (hypertension)    HLD (hyperlipidemia)    BPH (benign prostatic hyperplasia)    H/O shoulder surgery  right    H/O arthroscopy of knee    S/P arthroscopy of right knee        MEDICATIONS  (STANDING):  aspirin enteric coated 81 milliGRAM(s) Oral daily  cefuroxime  IVPB 1500 milliGRAM(s) IV Intermittent once  dextrose 40% Gel 15 Gram(s) Oral once  dextrose 50% Injectable 25 Gram(s) IV Push once  dextrose 50% Injectable 25 milliLiter(s) IV Push every 15 minutes  dextrose 50% Injectable 50 milliLiter(s) IV Push every 15 minutes  dextrose 50% Injectable 25 milliLiter(s) IV Push every 15 minutes  enoxaparin Injectable 80 milliGRAM(s) SubCutaneous two times a day  finasteride 5 milliGRAM(s) Oral daily  gabapentin 300 milliGRAM(s) Oral three times a day  gemfibrozil 600 milliGRAM(s) Oral two times a day  glucagon  Injectable 1 milliGRAM(s) IntraMuscular once  insulin regular Infusion 4 Unit(s)/Hr (4 mL/Hr) IV Continuous <Continuous>  metoprolol tartrate 25 milliGRAM(s) Oral two times a day  tamsulosin 0.4 milliGRAM(s) Oral at bedtime    MEDICATIONS  (PRN):      Vital Signs Last 24 Hrs  T(C): 36.8 (08-26-21 @ 06:48), Max: 36.8 (08-25-21 @ 18:59)  T(F): 98.2 (08-26-21 @ 06:48), Max: 98.2 (08-25-21 @ 18:59)  HR: 59 (08-26-21 @ 08:00) (59 - 82)  BP: 111/60 (08-26-21 @ 08:00) (111/60 - 137/63)  RR: 18 (08-26-21 @ 06:48) (18 - 19)  SpO2: 100% (08-26-21 @ 08:00) (97% - 100%)          ABO Interpretation: O (08-26 @ 05:19)     Daily     Daily   Admit Wt: Drug Dosing Weight      Labs:                        13.2   4.38  )-----------( 273      ( 26 Aug 2021 05:19 )             40.0     08-26    137  |  107  |  24<H>  ----------------------------<  92  3.7   |  17<L>  |  0.84    Ca    8.9      26 Aug 2021 05:19          Blood Type: ABO Interpretation: O (08-26 @ 05:19)    HGB A1C:   7.1  Pro-BNP:   Thyroid Panel: 08-21 @ 09:33/--  1.3/--/--  08-21 @ 02:11/0.79  --/--/75  08-20 @ 15:51/1.09  --/5.9/71    MRSA:  / MSSA:    neg    P2Y12   164 8/25    CXR: nad    Carotid Duplex:    neg    PFT's:    Echocardiogram: < from: TTE with Doppler (w/Cont) (08.20.21 @ 15:34) >  Conclusions:  Endocardial visualization enhanced with intravenous  injection of Ultrasonic Enhancing Agent (Definity).  Normal left ventricular internal dimensions and wall  thickness.  Estimated ejection fraction 60%. Akinesis of the basal  inferior and basal inferoseptal segments.    < end of copied text >      Cardiac catheterization:    PHYSICAL EXAM:  Neuro: A&Ox3, NAD  Pulm: B/L BS CTA  CV: RRR,+S1S2  Abd: Soft, NT/ND +BSX4Q  Ext: B/L LE no edema, +PP, no calf tenderness

## 2021-08-26 NOTE — PROGRESS NOTE ADULT - ASSESSMENT
Assessment  DMT2: 65y Male with DM T2 with hyperglycemia, A1C 7.1%, was on several oral meds at home, transitioned to IV insulin 2/2 hyperglycemia yesterday, received Lantus dose last night, blood sugars improved though trending up/running high postprandially this afternoon, no hypoglycemic episodes. Patient is eating meals, noncompliant with low-carb diet, appears comfortable, planning CABG tomorrow, requires tight glycemic control.  CAD: on medications, no chest pain, stable, monitored.  HTN: Controlled,  on antihypertensive medications.  Obesity: No strict exercise routines, not on any weight loss program, neither on low calorie diet.        Cb Hill MD  Cell: [phone]  Office: [phone]     Assessment  DMT2: 65y Male with DM T2 with hyperglycemia, A1C 7.1%, was on several oral meds at home, transitioned to IV insulin 2/2 hyperglycemia yesterday, received Lantus dose last night, blood sugars improved though trending up/running  high postprandially this afternoon, no hypoglycemic episodes. Patient is eating meals, noncompliant with low-carb diet, appears comfortable, planning CABG tomorrow, requires tight glycemic control.  CAD: on medications, no chest pain, stable, monitored.  HTN: Controlled,  on antihypertensive medications.  Obesity: No strict exercise routines, not on any weight loss program, neither on low calorie diet.        Cb Hill MD  Cell: [phone]  Office: [phone]

## 2021-08-26 NOTE — PROGRESS NOTE ADULT - ASSESSMENT
66 yo Male (denies implanted devices) with significant PMHx of T2DM (last HgbA1c 8.1, managed by PCP, complicated by peripheral neuropathy), HLD, HTN, BPH presented for RHC/LHC. Patient c/o chest discomfort with exertion that has been becoming progressively worse and resolves with rest associated with SOB.  Patient recently went to [facility] ER on 7/29 and patient states results were normal. Patient seen and evaluated by Dr. Celestin and presented for further evaluation and cardiac Cath.  Cath finding revealed: proximal left main 50 %, proximal LAD 95 % stenosis, proximal circumflex: 95 % and proximal RCA: 95 % stenosis. CTS consult called to evaluate patient for cardiac surgery with Dr. Deep Valerio.   8/22 P2Y12 80. Continue with BBlocker, statin asa. CABG tentatively scheduled in am pending repeat p2y12 in am 0400  8/23 CABG postponed P2Y12> 92 this am  Endo consulted elevated BS, Start statin (lopid at home)   ASA betablocker CAD Recheck P2Y12 am  8/24 P2Y12 @ 5pm-->   T&S to be sent at 5pm as well.   8/25 VVS; Hyperglycemia --> BFG > 200.  Transferred to SDU for Insulin gtt.  NPO after midnight  8/26 Off isulin gtt, resumed this  ирина  OR tomorrow.

## 2021-08-26 NOTE — PROGRESS NOTE ADULT - PROBLEM SELECTOR PROBLEM 2
HLD (hyperlipidemia) Bleeding that does not stop/Swelling that gets worse/Pain not relieved by Medications/Fever greater than (need to indicate Fahrenheit or Celsius)/Wound/Surgical Site with redness, or foul smelling discharge or pus

## 2021-08-26 NOTE — PROGRESS NOTE ADULT - SUBJECTIVE AND OBJECTIVE BOX
Chief complaint  Patient is a 65y old  Male who presents with a chief complaint of Cardiac Surgery (25 Aug 2021 18:27)   Review of systems  Patient looks comfortable, no hypoglycemic episodes.    Labs and Fingersticks  CAPILLARY BLOOD GLUCOSE  99 (26 Aug 2021 03:00)  93 (26 Aug 2021 02:30)  96 (26 Aug 2021 02:00)  70 (26 Aug 2021 01:30)  77 (26 Aug 2021 01:00)  71 (26 Aug 2021 00:32)  76 (25 Aug 2021 23:50)  92 (25 Aug 2021 23:00)  129 (25 Aug 2021 22:00)  172 (25 Aug 2021 21:00)  177 (25 Aug 2021 20:00)      POCT Blood Glucose.: 238 mg/dL (26 Aug 2021 11:57)  POCT Blood Glucose.: 90 mg/dL (26 Aug 2021 07:41)  POCT Blood Glucose.: 99 mg/dL (26 Aug 2021 03:04)  POCT Blood Glucose.: 93 mg/dL (26 Aug 2021 02:33)  POCT Blood Glucose.: 96 mg/dL (26 Aug 2021 02:06)  POCT Blood Glucose.: 70 mg/dL (26 Aug 2021 01:35)  POCT Blood Glucose.: 74 mg/dL (26 Aug 2021 01:32)  POCT Blood Glucose.: 77 mg/dL (26 Aug 2021 01:04)  POCT Blood Glucose.: 71 mg/dL (26 Aug 2021 00:32)  POCT Blood Glucose.: 76 mg/dL (25 Aug 2021 23:55)  POCT Blood Glucose.: 92 mg/dL (25 Aug 2021 23:08)  POCT Blood Glucose.: 129 mg/dL (25 Aug 2021 22:12)  POCT Blood Glucose.: 172 mg/dL (25 Aug 2021 21:23)  POCT Blood Glucose.: 177 mg/dL (25 Aug 2021 20:03)  POCT Blood Glucose.: 188 mg/dL (25 Aug 2021 19:04)  POCT Blood Glucose.: 155 mg/dL (25 Aug 2021 18:01)  POCT Blood Glucose.: 72 mg/dL (25 Aug 2021 17:27)  POCT Blood Glucose.: 132 mg/dL (25 Aug 2021 16:19)  POCT Blood Glucose.: 203 mg/dL (25 Aug 2021 15:18)  POCT Blood Glucose.: 259 mg/dL (25 Aug 2021 14:12)      Anion Gap, Serum: 13 (08-26 @ 05:19)  Anion Gap, Serum: 9 (08-25 @ 10:18)      Calcium, Total Serum: 8.9 (08-26 @ 05:19)  Calcium, Total Serum: 8.8 (08-25 @ 10:18)          08-26    137  |  107  |  24<H>  ----------------------------<  92  3.7   |  17<L>  |  0.84    Ca    8.9      26 Aug 2021 05:19                          13.2   4.38  )-----------( 273      ( 26 Aug 2021 05:19 )             40.0     Medications  MEDICATIONS  (STANDING):  aspirin enteric coated 81 milliGRAM(s) Oral daily  cefuroxime  IVPB 1500 milliGRAM(s) IV Intermittent once  dextrose 40% Gel 15 Gram(s) Oral once  dextrose 50% Injectable 25 Gram(s) IV Push once  dextrose 50% Injectable 25 milliLiter(s) IV Push every 15 minutes  dextrose 50% Injectable 50 milliLiter(s) IV Push every 15 minutes  dextrose 50% Injectable 25 milliLiter(s) IV Push every 15 minutes  enoxaparin Injectable 80 milliGRAM(s) SubCutaneous two times a day  finasteride 5 milliGRAM(s) Oral daily  gabapentin 300 milliGRAM(s) Oral three times a day  gemfibrozil 600 milliGRAM(s) Oral two times a day  glucagon  Injectable 1 milliGRAM(s) IntraMuscular once  insulin regular Infusion 4 Unit(s)/Hr (4 mL/Hr) IV Continuous <Continuous>  metoprolol tartrate 25 milliGRAM(s) Oral two times a day  tamsulosin 0.4 milliGRAM(s) Oral at bedtime      Physical Exam  General: Patient comfortable in bed  Vital Signs Last 12 Hrs  T(F): 98.2 (08-26-21 @ 06:48), Max: 98.2 (08-26-21 @ 06:48)  HR: 59 (08-26-21 @ 08:00) (59 - 62)  BP: 111/60 (08-26-21 @ 08:00) (111/60 - 131/62)  BP(mean): 80 (08-26-21 @ 08:00) (80 - 89)  RR: 18 (08-26-21 @ 06:48) (18 - 18)  SpO2: 100% (08-26-21 @ 08:00) (100% - 100%)  Neck: No palpable thyroid nodules.  CVS: S1S2, No murmurs  Respiratory: No wheezing, no crepitations  GI: Abdomen soft, bowel sounds positive  Musculoskeletal:  edema lower extremities.   Skin: No skin rashes, no ecchymosis    Diagnostics             Chief complaint  Patient is a 65y old  Male who presents with a chief complaint of Cardiac Surgery (25 Aug 2021 18:27)   Review of systems  Patient looks comfortable, no hypoglycemic episodes.    Labs and Fingersticks  CAPILLARY BLOOD GLUCOSE  99 (26 Aug 2021 03:00)  93 (26 Aug 2021 02:30)  96 (26 Aug 2021 02:00)  70 (26 Aug 2021 01:30)  77 (26 Aug 2021 01:00)  71 (26 Aug 2021 00:32)  76 (25 Aug 2021 23:50)  92 (25 Aug 2021 23:00)  129 (25 Aug 2021 22:00)  172 (25 Aug 2021 21:00)  177 (25 Aug 2021 20:00)      POCT Blood Glucose.: 238 mg/dL (26 Aug 2021 11:57)  POCT Blood Glucose.: 90 mg/dL (26 Aug 2021 07:41)  POCT Blood Glucose.: 99 mg/dL (26 Aug 2021 03:04)  POCT Blood Glucose.: 93 mg/dL (26 Aug 2021 02:33)  POCT Blood Glucose.: 96 mg/dL (26 Aug 2021 02:06)  POCT Blood Glucose.: 70 mg/dL (26 Aug 2021 01:35)  POCT Blood Glucose.: 74 mg/dL (26 Aug 2021 01:32)  POCT Blood Glucose.: 77 mg/dL (26 Aug 2021 01:04)  POCT Blood Glucose.: 71 mg/dL (26 Aug 2021 00:32)  POCT Blood Glucose.: 76 mg/dL (25 Aug 2021 23:55)  POCT Blood Glucose.: 92 mg/dL (25 Aug 2021 23:08)  POCT Blood Glucose.: 129 mg/dL (25 Aug 2021 22:12)  POCT Blood Glucose.: 172 mg/dL (25 Aug 2021 21:23)  POCT Blood Glucose.: 177 mg/dL (25 Aug 2021 20:03)  POCT Blood Glucose.: 188 mg/dL (25 Aug 2021 19:04)  POCT Blood Glucose.: 155 mg/dL (25 Aug 2021 18:01)  POCT Blood Glucose.: 72 mg/dL (25 Aug 2021 17:27)  POCT Blood Glucose.: 132 mg/dL (25 Aug 2021 16:19)  POCT Blood Glucose.: 203 mg/dL (25 Aug 2021 15:18)  POCT Blood Glucose.: 259 mg/dL (25 Aug 2021 14:12)      Anion Gap, Serum: 13 (08-26 @ 05:19)  Anion Gap, Serum: 9 (08-25 @ 10:18)      Calcium, Total Serum: 8.9 (08-26 @ 05:19)  Calcium, Total Serum: 8.8 (08-25 @ 10:18)          08-26    137  |  107  |  24<H>  ----------------------------<  92  3.7   |  17<L>  |  0.84    Ca    8.9      26 Aug 2021 05:19                          13.2   4.38  )-----------( 273      ( 26 Aug 2021 05:19 )             40.0     Medications  MEDICATIONS  (STANDING):  aspirin enteric coated 81 milliGRAM(s) Oral daily  cefuroxime  IVPB 1500 milliGRAM(s) IV Intermittent once  dextrose 40% Gel 15 Gram(s) Oral once  dextrose 50% Injectable 25 Gram(s) IV Push once  dextrose 50% Injectable 25 milliLiter(s) IV Push every 15 minutes  dextrose 50% Injectable 50 milliLiter(s) IV Push every 15 minutes  dextrose 50% Injectable 25 milliLiter(s) IV Push every 15 minutes  enoxaparin Injectable 80 milliGRAM(s) SubCutaneous two times a day  finasteride 5 milliGRAM(s) Oral daily  gabapentin 300 milliGRAM(s) Oral three times a day  gemfibrozil 600 milliGRAM(s) Oral two times a day  glucagon  Injectable 1 milliGRAM(s) IntraMuscular once  insulin regular Infusion 4 Unit(s)/Hr (4 mL/Hr) IV Continuous <Continuous>  metoprolol tartrate 25 milliGRAM(s) Oral two times a day  tamsulosin 0.4 milliGRAM(s) Oral at bedtime      Physical Exam  General: Patient comfortable in bed  Vital Signs Last 12 Hrs  T(F): 98.2 (08-26-21 @ 06:48), Max: 98.2 (08-26-21 @ 06:48)  HR: 59 (08-26-21 @ 08:00) (59 - 62)  BP: 111/60 (08-26-21 @ 08:00) (111/60 - 131/62)  BP(mean): 80 (08-26-21 @ 08:00) (80 - 89)  RR: 18 (08-26-21 @ 06:48) (18 - 18)  SpO2: 100% (08-26-21 @ 08:00) (100% - 100%)  Neck: No palpable thyroid nodules.  CVS: S1S2, No murmurs  Respiratory: No wheezing, no crepitations  GI: Abdomen soft, bowel sounds positive  Musculoskeletal:  edema lower extremities.   Skin: No skin rashes, no ecchymosis    Diagnostics

## 2021-08-27 ENCOUNTER — APPOINTMENT (OUTPATIENT)
Dept: CARDIOTHORACIC SURGERY | Facility: CLINIC | Age: 66
End: 2021-08-27

## 2021-08-27 LAB
ALBUMIN SERPL ELPH-MCNC: 2.3 G/DL — LOW (ref 3.3–5)
ALP SERPL-CCNC: 30 U/L — LOW (ref 40–120)
ALT FLD-CCNC: 12 U/L — SIGNIFICANT CHANGE UP (ref 10–45)
ANION GAP SERPL CALC-SCNC: 11 MMOL/L — SIGNIFICANT CHANGE UP (ref 5–17)
APTT BLD: 29.7 SEC — SIGNIFICANT CHANGE UP (ref 27.5–35.5)
AST SERPL-CCNC: 34 U/L — SIGNIFICANT CHANGE UP (ref 10–40)
BASE EXCESS BLDV CALC-SCNC: -0.5 MMOL/L — SIGNIFICANT CHANGE UP (ref -2–2)
BASE EXCESS BLDV CALC-SCNC: -1.2 MMOL/L — SIGNIFICANT CHANGE UP (ref -2–2)
BASE EXCESS BLDV CALC-SCNC: 0.3 MMOL/L — SIGNIFICANT CHANGE UP (ref -2–2)
BASE EXCESS BLDV CALC-SCNC: 1.1 MMOL/L — SIGNIFICANT CHANGE UP (ref -2–2)
BASE EXCESS BLDV CALC-SCNC: 2.8 MMOL/L — HIGH (ref -2–2)
BASOPHILS # BLD AUTO: 0.2 K/UL — SIGNIFICANT CHANGE UP (ref 0–0.2)
BASOPHILS NFR BLD AUTO: 3 % — HIGH (ref 0–2)
BILIRUB SERPL-MCNC: 0.7 MG/DL — SIGNIFICANT CHANGE UP (ref 0.2–1.2)
BLOOD GAS VENOUS - CREATININE: SIGNIFICANT CHANGE UP MG/DL (ref 0.5–1.3)
BUN SERPL-MCNC: 14 MG/DL — SIGNIFICANT CHANGE UP (ref 7–23)
CA-I SERPL-SCNC: 0.79 MMOL/L — LOW (ref 1.15–1.33)
CA-I SERPL-SCNC: 0.84 MMOL/L — LOW (ref 1.15–1.33)
CA-I SERPL-SCNC: 0.88 MMOL/L — LOW (ref 1.15–1.33)
CA-I SERPL-SCNC: 0.88 MMOL/L — LOW (ref 1.15–1.33)
CA-I SERPL-SCNC: 0.92 MMOL/L — LOW (ref 1.15–1.33)
CALCIUM SERPL-MCNC: 7.5 MG/DL — LOW (ref 8.4–10.5)
CHLORIDE BLDV-SCNC: 105 MMOL/L — SIGNIFICANT CHANGE UP (ref 96–108)
CHLORIDE BLDV-SCNC: 106 MMOL/L — SIGNIFICANT CHANGE UP (ref 96–108)
CHLORIDE BLDV-SCNC: 107 MMOL/L — SIGNIFICANT CHANGE UP (ref 96–108)
CHLORIDE SERPL-SCNC: 110 MMOL/L — HIGH (ref 96–108)
CK MB BLD-MCNC: 4.2 % — HIGH (ref 0–3.5)
CK MB CFR SERPL CALC: 13.6 NG/ML — HIGH (ref 0–6.7)
CK SERPL-CCNC: 322 U/L — HIGH (ref 30–200)
CO2 BLDV-SCNC: 25 MMOL/L — SIGNIFICANT CHANGE UP (ref 22–26)
CO2 BLDV-SCNC: 25 MMOL/L — SIGNIFICANT CHANGE UP (ref 22–26)
CO2 BLDV-SCNC: 26 MMOL/L — SIGNIFICANT CHANGE UP (ref 22–26)
CO2 BLDV-SCNC: 27 MMOL/L — HIGH (ref 22–26)
CO2 BLDV-SCNC: 29 MMOL/L — HIGH (ref 22–26)
CO2 SERPL-SCNC: 21 MMOL/L — LOW (ref 22–31)
CREAT SERPL-MCNC: 0.63 MG/DL — SIGNIFICANT CHANGE UP (ref 0.5–1.3)
EOSINOPHIL # BLD AUTO: 0.07 K/UL — SIGNIFICANT CHANGE UP (ref 0–0.5)
EOSINOPHIL NFR BLD AUTO: 1 % — SIGNIFICANT CHANGE UP (ref 0–6)
FIBRINOGEN PPP-MCNC: 302 MG/DL — SIGNIFICANT CHANGE UP (ref 290–520)
GAS PNL BLDA: SIGNIFICANT CHANGE UP
GAS PNL BLDV: 137 MMOL/L — SIGNIFICANT CHANGE UP (ref 136–145)
GAS PNL BLDV: 137 MMOL/L — SIGNIFICANT CHANGE UP (ref 136–145)
GAS PNL BLDV: 138 MMOL/L — SIGNIFICANT CHANGE UP (ref 136–145)
GAS PNL BLDV: 139 MMOL/L — SIGNIFICANT CHANGE UP (ref 136–145)
GAS PNL BLDV: 139 MMOL/L — SIGNIFICANT CHANGE UP (ref 136–145)
GAS PNL BLDV: SIGNIFICANT CHANGE UP
GLUCOSE BLDC GLUCOMTR-MCNC: 107 MG/DL — HIGH (ref 70–99)
GLUCOSE BLDC GLUCOMTR-MCNC: 110 MG/DL — HIGH (ref 70–99)
GLUCOSE BLDC GLUCOMTR-MCNC: 114 MG/DL — HIGH (ref 70–99)
GLUCOSE BLDC GLUCOMTR-MCNC: 118 MG/DL — HIGH (ref 70–99)
GLUCOSE BLDC GLUCOMTR-MCNC: 134 MG/DL — HIGH (ref 70–99)
GLUCOSE BLDC GLUCOMTR-MCNC: 153 MG/DL — HIGH (ref 70–99)
GLUCOSE BLDC GLUCOMTR-MCNC: 172 MG/DL — HIGH (ref 70–99)
GLUCOSE BLDC GLUCOMTR-MCNC: 186 MG/DL — HIGH (ref 70–99)
GLUCOSE BLDV-MCNC: 131 MG/DL — HIGH (ref 70–99)
GLUCOSE BLDV-MCNC: 133 MG/DL — HIGH (ref 70–99)
GLUCOSE BLDV-MCNC: 142 MG/DL — HIGH (ref 70–99)
GLUCOSE BLDV-MCNC: 156 MG/DL — HIGH (ref 70–99)
GLUCOSE BLDV-MCNC: 157 MG/DL — HIGH (ref 70–99)
GLUCOSE SERPL-MCNC: 112 MG/DL — HIGH (ref 70–99)
HCO3 BLDV-SCNC: 24 MMOL/L — SIGNIFICANT CHANGE UP (ref 22–29)
HCO3 BLDV-SCNC: 24 MMOL/L — SIGNIFICANT CHANGE UP (ref 22–29)
HCO3 BLDV-SCNC: 25 MMOL/L — SIGNIFICANT CHANGE UP (ref 22–29)
HCO3 BLDV-SCNC: 26 MMOL/L — SIGNIFICANT CHANGE UP (ref 22–29)
HCO3 BLDV-SCNC: 28 MMOL/L — SIGNIFICANT CHANGE UP (ref 22–29)
HCT VFR BLD CALC: 24 % — LOW (ref 39–50)
HCT VFR BLDA CALC: 20 % — CRITICAL LOW (ref 39–51)
HCT VFR BLDA CALC: 22 % — LOW (ref 39–51)
HCT VFR BLDA CALC: 23 % — LOW (ref 39–51)
HCT VFR BLDA CALC: 25 % — LOW (ref 39–51)
HCT VFR BLDA CALC: 26 % — LOW (ref 39–51)
HGB BLD CALC-MCNC: 6.7 G/DL — CRITICAL LOW (ref 12.6–17.4)
HGB BLD CALC-MCNC: 7.4 G/DL — LOW (ref 12.6–17.4)
HGB BLD CALC-MCNC: 7.5 G/DL — LOW (ref 12.6–17.4)
HGB BLD CALC-MCNC: 8.2 G/DL — LOW (ref 12.6–17.4)
HGB BLD CALC-MCNC: 8.6 G/DL — LOW (ref 12.6–17.4)
HGB BLD-MCNC: 8.4 G/DL — LOW (ref 13–17)
INR BLD: 1.69 RATIO — HIGH (ref 0.88–1.16)
LACTATE BLDV-MCNC: 0.8 MMOL/L — SIGNIFICANT CHANGE UP (ref 0.7–2)
LACTATE BLDV-MCNC: 0.9 MMOL/L — SIGNIFICANT CHANGE UP (ref 0.7–2)
LACTATE BLDV-MCNC: 0.9 MMOL/L — SIGNIFICANT CHANGE UP (ref 0.7–2)
LACTATE BLDV-MCNC: 1 MMOL/L — SIGNIFICANT CHANGE UP (ref 0.7–2)
LACTATE BLDV-MCNC: 1 MMOL/L — SIGNIFICANT CHANGE UP (ref 0.7–2)
LYMPHOCYTES # BLD AUTO: 0.94 K/UL — LOW (ref 1–3.3)
LYMPHOCYTES # BLD AUTO: 14 % — SIGNIFICANT CHANGE UP (ref 13–44)
MAGNESIUM SERPL-MCNC: 2.6 MG/DL — SIGNIFICANT CHANGE UP (ref 1.6–2.6)
MANUAL SMEAR VERIFICATION: SIGNIFICANT CHANGE UP
MCHC RBC-ENTMCNC: 31.7 PG — SIGNIFICANT CHANGE UP (ref 27–34)
MCHC RBC-ENTMCNC: 35 GM/DL — SIGNIFICANT CHANGE UP (ref 32–36)
MCV RBC AUTO: 90.6 FL — SIGNIFICANT CHANGE UP (ref 80–100)
MONOCYTES # BLD AUTO: 0.13 K/UL — SIGNIFICANT CHANGE UP (ref 0–0.9)
MONOCYTES NFR BLD AUTO: 2 % — SIGNIFICANT CHANGE UP (ref 2–14)
NEUTROPHILS # BLD AUTO: 5.38 K/UL — SIGNIFICANT CHANGE UP (ref 1.8–7.4)
NEUTROPHILS NFR BLD AUTO: 73 % — SIGNIFICANT CHANGE UP (ref 43–77)
NEUTS BAND # BLD: 7 % — SIGNIFICANT CHANGE UP (ref 0–8)
NRBC # BLD: 0 /100 — SIGNIFICANT CHANGE UP (ref 0–0)
PCO2 BLDV: 37 MMHG — LOW (ref 42–55)
PCO2 BLDV: 38 MMHG — LOW (ref 42–55)
PCO2 BLDV: 38 MMHG — LOW (ref 42–55)
PCO2 BLDV: 42 MMHG — SIGNIFICANT CHANGE UP (ref 42–55)
PCO2 BLDV: 45 MMHG — SIGNIFICANT CHANGE UP (ref 42–55)
PH BLDV: 7.4 — SIGNIFICANT CHANGE UP (ref 7.32–7.43)
PH BLDV: 7.41 — SIGNIFICANT CHANGE UP (ref 7.32–7.43)
PH BLDV: 7.43 — SIGNIFICANT CHANGE UP (ref 7.32–7.43)
PHOSPHATE SERPL-MCNC: 2 MG/DL — LOW (ref 2.5–4.5)
PLAT MORPH BLD: NORMAL — SIGNIFICANT CHANGE UP
PLATELET # BLD AUTO: 160 K/UL — SIGNIFICANT CHANGE UP (ref 150–400)
PO2 BLDV: 118 MMHG — HIGH (ref 25–45)
PO2 BLDV: 53 MMHG — HIGH (ref 25–45)
PO2 BLDV: 56 MMHG — HIGH (ref 25–45)
PO2 BLDV: 79 MMHG — HIGH (ref 25–45)
PO2 BLDV: 97 MMHG — HIGH (ref 25–45)
POTASSIUM BLDV-SCNC: 3.7 MMOL/L — SIGNIFICANT CHANGE UP (ref 3.5–5.1)
POTASSIUM BLDV-SCNC: 3.9 MMOL/L — SIGNIFICANT CHANGE UP (ref 3.5–5.1)
POTASSIUM BLDV-SCNC: 4.1 MMOL/L — SIGNIFICANT CHANGE UP (ref 3.5–5.1)
POTASSIUM BLDV-SCNC: 4.6 MMOL/L — SIGNIFICANT CHANGE UP (ref 3.5–5.1)
POTASSIUM BLDV-SCNC: 5.2 MMOL/L — HIGH (ref 3.5–5.1)
POTASSIUM SERPL-MCNC: 4 MMOL/L — SIGNIFICANT CHANGE UP (ref 3.5–5.3)
POTASSIUM SERPL-SCNC: 4 MMOL/L — SIGNIFICANT CHANGE UP (ref 3.5–5.3)
PROT SERPL-MCNC: 3.5 G/DL — LOW (ref 6–8.3)
PROTHROM AB SERPL-ACNC: 19.8 SEC — HIGH (ref 10.6–13.6)
RBC # BLD: 2.65 M/UL — LOW (ref 4.2–5.8)
RBC # FLD: 12.9 % — SIGNIFICANT CHANGE UP (ref 10.3–14.5)
RBC BLD AUTO: SIGNIFICANT CHANGE UP
SAO2 % BLDV: 89.6 % — HIGH (ref 67–88)
SAO2 % BLDV: 91.4 % — HIGH (ref 67–88)
SAO2 % BLDV: 98 % — HIGH (ref 67–88)
SAO2 % BLDV: 98.9 % — HIGH (ref 67–88)
SAO2 % BLDV: 98.9 % — HIGH (ref 67–88)
SODIUM SERPL-SCNC: 142 MMOL/L — SIGNIFICANT CHANGE UP (ref 135–145)
TROPONIN T, HIGH SENSITIVITY RESULT: 293 NG/L — HIGH (ref 0–51)
WBC # BLD: 6.73 K/UL — SIGNIFICANT CHANGE UP (ref 3.8–10.5)
WBC # FLD AUTO: 6.73 K/UL — SIGNIFICANT CHANGE UP (ref 3.8–10.5)

## 2021-08-27 PROCEDURE — 33533 CABG ARTERIAL SINGLE: CPT

## 2021-08-27 PROCEDURE — 33518 CABG ARTERY-VEIN TWO: CPT

## 2021-08-27 PROCEDURE — 99233 SBSQ HOSP IP/OBS HIGH 50: CPT

## 2021-08-27 PROCEDURE — 71045 X-RAY EXAM CHEST 1 VIEW: CPT | Mod: 26

## 2021-08-27 PROCEDURE — 93010 ELECTROCARDIOGRAM REPORT: CPT

## 2021-08-27 PROCEDURE — 33508 ENDOSCOPIC VEIN HARVEST: CPT | Mod: 59

## 2021-08-27 RX ORDER — SODIUM CHLORIDE 9 MG/ML
500 INJECTION, SOLUTION INTRAVENOUS ONCE
Refills: 0 | Status: COMPLETED | OUTPATIENT
Start: 2021-08-27 | End: 2021-08-27

## 2021-08-27 RX ORDER — METOCLOPRAMIDE HCL 10 MG
10 TABLET ORAL EVERY 8 HOURS
Refills: 0 | Status: DISCONTINUED | OUTPATIENT
Start: 2021-08-27 | End: 2021-08-27

## 2021-08-27 RX ORDER — POLYETHYLENE GLYCOL 3350 17 G/17G
17 POWDER, FOR SOLUTION ORAL DAILY
Refills: 0 | Status: DISCONTINUED | OUTPATIENT
Start: 2021-08-27 | End: 2021-08-28

## 2021-08-27 RX ORDER — NOREPINEPHRINE BITARTRATE/D5W 8 MG/250ML
0.04 PLASTIC BAG, INJECTION (ML) INTRAVENOUS
Qty: 8 | Refills: 0 | Status: DISCONTINUED | OUTPATIENT
Start: 2021-08-27 | End: 2021-08-28

## 2021-08-27 RX ORDER — POTASSIUM CHLORIDE 20 MEQ
10 PACKET (EA) ORAL
Refills: 0 | Status: DISCONTINUED | OUTPATIENT
Start: 2021-08-27 | End: 2021-08-28

## 2021-08-27 RX ORDER — VASOPRESSIN 20 [USP'U]/ML
0.03 INJECTION INTRAVENOUS
Qty: 50 | Refills: 0 | Status: DISCONTINUED | OUTPATIENT
Start: 2021-08-27 | End: 2021-08-29

## 2021-08-27 RX ORDER — SODIUM CHLORIDE 9 MG/ML
1500 INJECTION, SOLUTION INTRAVENOUS ONCE
Refills: 0 | Status: COMPLETED | OUTPATIENT
Start: 2021-08-27 | End: 2021-08-27

## 2021-08-27 RX ORDER — ALBUMIN HUMAN 25 %
250 VIAL (ML) INTRAVENOUS ONCE
Refills: 0 | Status: COMPLETED | OUTPATIENT
Start: 2021-08-27 | End: 2021-08-27

## 2021-08-27 RX ORDER — PROPOFOL 10 MG/ML
15 INJECTION, EMULSION INTRAVENOUS
Qty: 1000 | Refills: 0 | Status: DISCONTINUED | OUTPATIENT
Start: 2021-08-27 | End: 2021-08-28

## 2021-08-27 RX ORDER — DEXMEDETOMIDINE HYDROCHLORIDE IN 0.9% SODIUM CHLORIDE 4 UG/ML
0.2 INJECTION INTRAVENOUS
Qty: 200 | Refills: 0 | Status: DISCONTINUED | OUTPATIENT
Start: 2021-08-27 | End: 2021-08-28

## 2021-08-27 RX ORDER — OXYCODONE AND ACETAMINOPHEN 5; 325 MG/1; MG/1
1 TABLET ORAL EVERY 4 HOURS
Refills: 0 | Status: DISCONTINUED | OUTPATIENT
Start: 2021-08-27 | End: 2021-08-28

## 2021-08-27 RX ORDER — HYDROMORPHONE HYDROCHLORIDE 2 MG/ML
0.5 INJECTION INTRAMUSCULAR; INTRAVENOUS; SUBCUTANEOUS ONCE
Refills: 0 | Status: DISCONTINUED | OUTPATIENT
Start: 2021-08-27 | End: 2021-08-27

## 2021-08-27 RX ORDER — POTASSIUM CHLORIDE 20 MEQ
10 PACKET (EA) ORAL
Refills: 0 | Status: COMPLETED | OUTPATIENT
Start: 2021-08-27 | End: 2021-08-27

## 2021-08-27 RX ORDER — DEXTROSE 50 % IN WATER 50 %
50 SYRINGE (ML) INTRAVENOUS
Refills: 0 | Status: DISCONTINUED | OUTPATIENT
Start: 2021-08-27 | End: 2021-08-28

## 2021-08-27 RX ORDER — MAGNESIUM SULFATE 500 MG/ML
1 VIAL (ML) INJECTION ONCE
Refills: 0 | Status: COMPLETED | OUTPATIENT
Start: 2021-08-27 | End: 2021-08-27

## 2021-08-27 RX ORDER — CALCIUM GLUCONATE 100 MG/ML
2 VIAL (ML) INTRAVENOUS ONCE
Refills: 0 | Status: COMPLETED | OUTPATIENT
Start: 2021-08-27 | End: 2021-08-27

## 2021-08-27 RX ORDER — SODIUM CHLORIDE 9 MG/ML
1000 INJECTION INTRAMUSCULAR; INTRAVENOUS; SUBCUTANEOUS
Refills: 0 | Status: DISCONTINUED | OUTPATIENT
Start: 2021-08-27 | End: 2021-08-28

## 2021-08-27 RX ORDER — SODIUM CHLORIDE 9 MG/ML
10 INJECTION INTRAMUSCULAR; INTRAVENOUS; SUBCUTANEOUS
Refills: 0 | Status: DISCONTINUED | OUTPATIENT
Start: 2021-08-27 | End: 2021-09-06

## 2021-08-27 RX ORDER — METOCLOPRAMIDE HCL 10 MG
10 TABLET ORAL EVERY 8 HOURS
Refills: 0 | Status: COMPLETED | OUTPATIENT
Start: 2021-08-27 | End: 2021-08-29

## 2021-08-27 RX ORDER — FENTANYL CITRATE 50 UG/ML
50 INJECTION INTRAVENOUS ONCE
Refills: 0 | Status: DISCONTINUED | OUTPATIENT
Start: 2021-08-27 | End: 2021-08-27

## 2021-08-27 RX ORDER — ACETAMINOPHEN 500 MG
1000 TABLET ORAL ONCE
Refills: 0 | Status: COMPLETED | OUTPATIENT
Start: 2021-08-27 | End: 2021-08-27

## 2021-08-27 RX ORDER — MEPERIDINE HYDROCHLORIDE 50 MG/ML
25 INJECTION INTRAMUSCULAR; INTRAVENOUS; SUBCUTANEOUS ONCE
Refills: 0 | Status: DISCONTINUED | OUTPATIENT
Start: 2021-08-27 | End: 2021-08-28

## 2021-08-27 RX ORDER — PANTOPRAZOLE SODIUM 20 MG/1
40 TABLET, DELAYED RELEASE ORAL DAILY
Refills: 0 | Status: DISCONTINUED | OUTPATIENT
Start: 2021-08-27 | End: 2021-08-28

## 2021-08-27 RX ORDER — CHLORHEXIDINE GLUCONATE 213 G/1000ML
15 SOLUTION TOPICAL EVERY 12 HOURS
Refills: 0 | Status: DISCONTINUED | OUTPATIENT
Start: 2021-08-27 | End: 2021-08-28

## 2021-08-27 RX ORDER — ALBUMIN HUMAN 25 %
250 VIAL (ML) INTRAVENOUS
Refills: 0 | Status: COMPLETED | OUTPATIENT
Start: 2021-08-27 | End: 2021-08-27

## 2021-08-27 RX ORDER — INSULIN HUMAN 100 [IU]/ML
3 INJECTION, SOLUTION SUBCUTANEOUS
Qty: 100 | Refills: 0 | Status: DISCONTINUED | OUTPATIENT
Start: 2021-08-27 | End: 2021-08-28

## 2021-08-27 RX ORDER — OXYCODONE AND ACETAMINOPHEN 5; 325 MG/1; MG/1
2 TABLET ORAL EVERY 6 HOURS
Refills: 0 | Status: DISCONTINUED | OUTPATIENT
Start: 2021-08-27 | End: 2021-08-28

## 2021-08-27 RX ORDER — CEFUROXIME AXETIL 250 MG
1500 TABLET ORAL EVERY 8 HOURS
Refills: 0 | Status: COMPLETED | OUTPATIENT
Start: 2021-08-27 | End: 2021-08-29

## 2021-08-27 RX ORDER — FENTANYL CITRATE 50 UG/ML
25 INJECTION INTRAVENOUS ONCE
Refills: 0 | Status: DISCONTINUED | OUTPATIENT
Start: 2021-08-27 | End: 2021-08-27

## 2021-08-27 RX ORDER — CHLORHEXIDINE GLUCONATE 213 G/1000ML
1 SOLUTION TOPICAL
Refills: 0 | Status: DISCONTINUED | OUTPATIENT
Start: 2021-08-27 | End: 2021-08-30

## 2021-08-27 RX ORDER — DEXTROSE 50 % IN WATER 50 %
25 SYRINGE (ML) INTRAVENOUS
Refills: 0 | Status: DISCONTINUED | OUTPATIENT
Start: 2021-08-27 | End: 2021-08-28

## 2021-08-27 RX ADMIN — Medication 1000 MILLIGRAM(S): at 22:30

## 2021-08-27 RX ADMIN — Medication 125 MILLILITER(S): at 21:27

## 2021-08-27 RX ADMIN — CHLORHEXIDINE GLUCONATE 1 APPLICATION(S): 213 SOLUTION TOPICAL at 06:15

## 2021-08-27 RX ADMIN — CHLORHEXIDINE GLUCONATE 30 MILLILITER(S): 213 SOLUTION TOPICAL at 05:32

## 2021-08-27 RX ADMIN — Medication 25 MILLIGRAM(S): at 05:32

## 2021-08-27 RX ADMIN — HYDROMORPHONE HYDROCHLORIDE 0.5 MILLIGRAM(S): 2 INJECTION INTRAMUSCULAR; INTRAVENOUS; SUBCUTANEOUS at 18:00

## 2021-08-27 RX ADMIN — SODIUM CHLORIDE 2000 MILLILITER(S): 9 INJECTION, SOLUTION INTRAVENOUS at 15:00

## 2021-08-27 RX ADMIN — Medication 100 MILLIEQUIVALENT(S): at 21:27

## 2021-08-27 RX ADMIN — Medication 100 MILLIGRAM(S): at 20:00

## 2021-08-27 RX ADMIN — Medication 400 MILLIGRAM(S): at 22:08

## 2021-08-27 RX ADMIN — Medication 125 MILLILITER(S): at 16:15

## 2021-08-27 RX ADMIN — FENTANYL CITRATE 25 MICROGRAM(S): 50 INJECTION INTRAVENOUS at 22:30

## 2021-08-27 RX ADMIN — FENTANYL CITRATE 25 MICROGRAM(S): 50 INJECTION INTRAVENOUS at 22:08

## 2021-08-27 RX ADMIN — Medication 125 MILLILITER(S): at 21:00

## 2021-08-27 RX ADMIN — FENTANYL CITRATE 50 MICROGRAM(S): 50 INJECTION INTRAVENOUS at 16:30

## 2021-08-27 RX ADMIN — Medication 100 GRAM(S): at 23:39

## 2021-08-27 RX ADMIN — Medication 600 MILLIGRAM(S): at 05:33

## 2021-08-27 RX ADMIN — Medication 100 MILLIEQUIVALENT(S): at 21:10

## 2021-08-27 RX ADMIN — Medication 10 MILLIGRAM(S): at 21:27

## 2021-08-27 RX ADMIN — FENTANYL CITRATE 50 MICROGRAM(S): 50 INJECTION INTRAVENOUS at 16:00

## 2021-08-27 RX ADMIN — CHLORHEXIDINE GLUCONATE 15 MILLILITER(S): 213 SOLUTION TOPICAL at 19:42

## 2021-08-27 RX ADMIN — HYDROMORPHONE HYDROCHLORIDE 0.5 MILLIGRAM(S): 2 INJECTION INTRAMUSCULAR; INTRAVENOUS; SUBCUTANEOUS at 19:44

## 2021-08-27 RX ADMIN — HYDROMORPHONE HYDROCHLORIDE 0.5 MILLIGRAM(S): 2 INJECTION INTRAMUSCULAR; INTRAVENOUS; SUBCUTANEOUS at 17:35

## 2021-08-27 RX ADMIN — GABAPENTIN 300 MILLIGRAM(S): 400 CAPSULE ORAL at 05:32

## 2021-08-27 RX ADMIN — Medication 62.5 MILLIMOLE(S): at 17:00

## 2021-08-27 RX ADMIN — Medication 200 GRAM(S): at 19:43

## 2021-08-27 RX ADMIN — Medication 100 MILLIEQUIVALENT(S): at 19:44

## 2021-08-27 RX ADMIN — PANTOPRAZOLE SODIUM 40 MILLIGRAM(S): 20 TABLET, DELAYED RELEASE ORAL at 21:27

## 2021-08-27 RX ADMIN — SODIUM CHLORIDE 3000 MILLILITER(S): 9 INJECTION, SOLUTION INTRAVENOUS at 16:30

## 2021-08-27 RX ADMIN — HYDROMORPHONE HYDROCHLORIDE 0.5 MILLIGRAM(S): 2 INJECTION INTRAMUSCULAR; INTRAVENOUS; SUBCUTANEOUS at 20:00

## 2021-08-27 NOTE — PRE-ANESTHESIA EVALUATION ADULT - NSANTHPMHFT_GEN_ALL_CORE
64yo M h/o HTN, HLD, DM2  w/ peripheral neuropathy (a1c 8.1) admitted w/ unstable angina 64yo M h/o HTN, HLD, DM2  w/ peripheral neuropathy (a1c 8.1) admitted w/ unstable angina.   H/o palatal surgery 20yr ago to "remove a growth." Pt states that when he drinks water, sometimes it comes out his nose.

## 2021-08-27 NOTE — PRE-OP CHECKLIST - SELECT TESTS ORDERED
BMP/CBC/Type and Screen/COVID-19 BMP/CBC/PT/PTT/INR/Type and Screen/COVID-19 BMP/CBC/PT/PTT/INR/Hepatic Function/Type and Screen/Urinalysis/EKG/CXR/POCT Blood Glucose/COVID-19

## 2021-08-27 NOTE — PRE-ANESTHESIA EVALUATION ADULT - NSANTHAIRWAYFT_ENT_ALL_CORE
tm distance > 3 fb tm distance > 3 fb. No visible openings in the hard or soft palate . no loose or cracked teeth.

## 2021-08-27 NOTE — PROGRESS NOTE ADULT - SUBJECTIVE AND OBJECTIVE BOX
Patient seen and examined at the bedside.    Remained critically ill on continuous ICU monitoring.    OBJECTIVE:  Vital Signs Last 24 Hrs  T(C): 35.2 (27 Aug 2021 16:00), Max: 36.7 (26 Aug 2021 19:19)  T(F): 95.4 (27 Aug 2021 16:00), Max: 98.1 (26 Aug 2021 19:19)  HR: 90 (27 Aug 2021 19:00) (60 - 90)  BP: 129/71 (27 Aug 2021 06:32) (116/55 - 132/65)  BP(mean): 94 (27 Aug 2021 06:32) (79 - 94)  RR: 10 (27 Aug 2021 19:00) (10 - 49)  SpO2: 100% (27 Aug 2021 19:00) (99% - 100%)    REVIEW OF SYSTEMS:    [x] Unable to assess ROS due to sedation and intubatio    Physical Exam:  General: Sedated and intubated with multiple lines, gtt, & tubes  Neurology: Sedated  Eyes: EOMI, Gross vision intact  ENT/Neck: Neck supple, + ENT midline, trachea midline, No JVD, Gross hearing intact  Respiratory: No wheezing, rhonchi. Rales noted bilaterally.  CV: RRR, S1S2, no murmurs, rubs or gallops        [x] Sternal dressing, [x] Mediastinal CT, [x] Pleural CT, [] CLAUDIO drain        [x] Sinus rhythm, [] Afib, [] Temporary pacing, [] PPM  Abdominal: Soft, NT, ND +BS,   Extremities: 1-2+ pedal edema noted, + peripheral pulses  Skin: No Rashes, Hematoma, Ecchymosis                         LINES:  [x] Arterial Line   [x] Central Line  [ ] PA catheter  [ ] IABP  [ ] ECMO  [ ] LVAD  [x] Ventilator  [ ] pacemaker [ ] Impella      RADIOLOGY:    Assessment:  65 year old male (denies implanted devices) with significant PMHx of T2DM (last HgbA1c 8.1, managed by PCP, complicated by peripheral neuropathy), HLD, HTN, and BPH.    CAD s/p CABG w/ JI POD# 0  Hypertension   Diabetes Mellitus Type 2   Benign Prostatic Hyperplasia   Acute Blood Loss Anemia s/p 3u pRBC, 2 plt, and 5u cryo   Obesity OHS / MICAH    Plan:   ***Neuro***   [] Nonfocal  [x] Sedated  [] Motor Strength  [] Speech    ***Cardiovascular***  CAD s/p CABG w/ JI POD# 0  Hematocrit 30%, lactate 2.9 S/P OR: 2u pRBC, 2 plt, 5u cryo CTU: 1u pRBC   Transfuse pRBCs if remain hemodynamically unstable   SR / 66 no back up pacing / monitor for Rhythm abnormalities    Hemodynamic lability, instability. Requires IVCD []  []Epi  []Primacor  [x]Levo  [x] Vaso  [] Cardene  [] Nitroprusside  Anticoagulation: [] Heparin  [] Argatroban [] Lovenox  [] Coumadin // [] HIT positive [] JOSIE positive  Antiplatelet therapy: [] ASA  [] Brilinta  [] Plavix   Chest tubes: [] actively bleeding [x] non-bleeding    CHF- acute [ ]   chronic [ ]    systolic [x]   diastolic [ ]          - Echo- EF -     55%      ***Pulmonary***  Post op vent management   Morbid Obesity BMI: 30.7  At risk for post op respiratory insufficiency     Ventilator Management:  [x]AC-rest  [] CPAP-PS Wean  []Trach Collar  []Extubate  [] T-Piece  []peep>5   Mode: AC/ CMV (Assist Control/ Continuous Mandatory Ventilation)  RR (machine): 12  TV (machine): 700  FiO2: 50  PEEP: 5  ITime: 1  MAP: 10  PIP: 23            Will plan to wean & extubate once pt is hemodynamically stable.     ***GI***  Stress ulcer prophylaxis: [x] Protonix  [] Pepcid  NPO after recent procedure, advance diet as tolerated.     ***Renal***  Hx of BPH  Continue to monitor I/Os, BUN/Creatinine.   Replete lytes PRN. Keep K> 4 and Mg >2.    I&O's Summary    26 Aug 2021 07:  -  27 Aug 2021 07:00  --------------------------------------------------------  IN: 417 mL / OUT: 1050 mL / NET: -633 mL    27 Aug 2021 07:  -  27 Aug 2021 19:09  --------------------------------------------------------  IN: 2895.9 mL / OUT: 1145 mL / NET: 1750.9 mL      ***ID***  Afebrile, WBC within normal limits.   Continue Cefuroxime for perioperative antibiotic coverage.    ***Endocrine***  [x] Hyperglycemia  [x] DM2 [] DM1 [] Prediabetes : HbA1c 7.1%             - [x] Insulin gtt  [] ISS  [] NPH  [] Lantus             - Need tight glycemic control to prevent wound infection.    [] Acquired Hypothyroidism: Continue with Synthroid.        Patient requires continuous monitoring with bedside rhythm monitoring, pulse oximetry monitoring, and continuous central venous and arterial pressure monitoring; and intermittent blood gas analysis. Care plan discussed with the ICU care team.   Patient remained critical, at risk for life threatening decompensation.    I have spent 30 minutes providing critical care management to this patient.    By signing my name below, I, Isabel Giles, attest that this documentation has been prepared under the direction and in the presence of Marc Leroy MD   Electronically signed: Santhosh Rodriguez, 21 @ 19:09    I, Marc Leroy, personally performed the services described in this documentation. all medical record entries made by the scribe were at my direction and in my presence. I have reviewed the chart and agree that the record reflects my personal performance and is accurate and complete  Electronically signed: Marc Leroy MD  Patient seen and examined at the bedside.    Remained critically ill on continuous ICU monitoring.    OBJECTIVE:  Vital Signs Last 24 Hrs  T(C): 35.2 (27 Aug 2021 16:00), Max: 36.7 (26 Aug 2021 19:19)  T(F): 95.4 (27 Aug 2021 16:00), Max: 98.1 (26 Aug 2021 19:19)  HR: 90 (27 Aug 2021 19:00) (60 - 90)  BP: 129/71 (27 Aug 2021 06:32) (116/55 - 132/65)  BP(mean): 94 (27 Aug 2021 06:32) (79 - 94)  RR: 10 (27 Aug 2021 19:00) (10 - 49)  SpO2: 100% (27 Aug 2021 19:00) (99% - 100%)    REVIEW OF SYSTEMS:    [x] Unable to assess ROS due to sedation and intubation    Physical Exam:  General: Sedated and intubated with multiple lines, gtt, & tubes  Neurology: Sedated  Eyes: EOMI,   ENT/Neck: Neck supple, + ET  midline, trachea midline, No JVD, Gross hearing intact  Respiratory:Rales noted bilaterally.  CV: RRR, S1S2, no murmurs, rubs or gallops        [x] Sternal dressing, [x] Mediastinal CT, [x] Pleural CT,         [x] Sinus rhythm,   Abdominal: Soft, NT, ND +BS,   Extremities: 1-2+ pedal edema noted, + peripheral pulses  Skin: No Rashes, Hematoma, Ecchymosis                         LINES:  [x] Arterial Line   [x] Central Line   [x] Ventilator            Assessment:    65 year old male Hx of T2DM (last HgbA1c 8.1complicated by peripheral neuropathy), HLD, Hypertriglyceridemia,  HTN, and BPH.  S/P cath with multivessel CAD / TTE moderate LV systolic segmental dysfunction     S/P CABG X3  POD# 0  Post op hypovolemia / labile BP /   Acute Blood Loss Anemia S/P  3u PRBC, 2 plt, and 5u cryo   DM2 w hyperglycemia     Plan:   ***Neuro***    [x] Sedated    Woke up post op moved all 4     ***Cardiovascular***  Post CABG invasive hemodynamic monitoring   SR / no Tachy or bradyarrhythmias / no back up pacing   Labile hemodynamics , requiring Levo gtt for MAP >65  Hemodynamically significant hypovolemia   Low CVP cont volume loading & reassessing hemodynamics / serial lactates   Hct 24% Transfused 1 PRBC   Monitor CT outputs   Start ASA / Statins / Gemfibrozil        ***Pulmonary***  Post op vent management   Morbid Obesity BMI: 30.7  At risk for post op respiratory insufficiency     Ventilator Management:  [x]AC-rest    Mode: AC  RR (machine): 12  TV (machine): 700  FiO2: 50  PEEP: 5  ITime: 1  MAP: 10  PIP: 23            Will plan to wean & extubate once pt is hemodynamically stable.   SPo2 / ABG / CXR     ***GI***  Stress ulcer prophylaxis: [x] Protonix    NPO after recent procedure, advance diet as tolerated.     ***Renal***  BPH   Continue to monitor I/Os, BUN/Creatinine.   Replete lytes PRN. Keep K> 4 and Mg >2.  Proscar / Flomax         ***ID***  Afebrile, WBC within normal limits.   Continue Cefuroxime for perioperative antibiotic coverage.    ***Endocrine***  [x] Hyperglycemia  [x] DM2 HbA1c 7.1%             - [x] Insulin gtt               - Need tight glycemic control to prevent wound infection.            Patient requires continuous monitoring with bedside rhythm monitoring, pulse oximetry monitoring, and continuous central venous and arterial pressure monitoring; and intermittent blood gas analysis. Care plan discussed with the ICU care team.   Patient remained critical, at risk for life threatening decompensation.    I have spent 30 minutes providing critical care management to this patient.    By signing my name below, I, Isabel Giles, attest that this documentation has been prepared under the direction and in the presence of Marc Leroy MD   Electronically signed: Santhosh Rodriguez, 08-27-21 @ 19:09    I, Marc Leory, personally performed the services described in this documentation. all medical record entries made by the scriblin were at my direction and in my presence. I have reviewed the chart and agree that the record reflects my personal performance and is accurate and complete  Electronically signed: Marc Leroy MD

## 2021-08-27 NOTE — PRE-ANESTHESIA EVALUATION ADULT - NSANTHPROCED_GEN_ALL_CORE
Arterial Catheter/Central Venous Catheter/Transesophageal Echocardiogram
Arterial Catheter/Central Venous Catheter/Transesophageal Echocardiogram

## 2021-08-27 NOTE — PRE-ANESTHESIA EVALUATION ADULT - NSANTHOSAYNRD_GEN_A_CORE
No. MICAH screening performed.  STOP BANG Legend: 0-2 = LOW Risk; 3-4 = INTERMEDIATE Risk; 5-8 = HIGH Risk

## 2021-08-27 NOTE — PROGRESS NOTE ADULT - ASSESSMENT
Assessment  DMT2: 65y Male with DM T2 with hyperglycemia, A1C 7.1%, was on several oral meds at home, transitioned to IV insulin due to hyperglycemia, had hypoglycemia last night, blood sugars improved and in acceptable range preoperatively, no new hypoglycemias today, NPO for CABG.  CAD: on medications, no chest pain, stable, monitored.  HTN: Controlled,  on antihypertensive medications.  Obesity: No strict exercise routines, not on any weight loss program, neither on low calorie diet.        Cb Hill MD  Cell: [phone]  Office: [phone]     Assessment  DMT2: 65y Male with DM T2 with hyperglycemia, A1C 7.1%, was on several oral meds at home, transitioned to IV insulin due to hyperglycemia, had hypoglycemia last night, blood  sugars improved and in acceptable range preoperatively, no new hypoglycemias today, NPO for CABG.  CAD: on medications, no chest pain, stable, monitored.  HTN: Controlled,  on antihypertensive medications.  Obesity: No strict exercise routines, not on any weight loss program, neither on low calorie diet.        Cb Hill MD  Cell: [phone]  Office: [phone]

## 2021-08-27 NOTE — PROGRESS NOTE ADULT - SUBJECTIVE AND OBJECTIVE BOX
Chief complaint  Patient is a 65y old  Male who presents with a chief complaint of Cardiac Surgery (25 Aug 2021 18:27)   Review of systems  Patient for CABG today, had hypoglycemia while on IV insulin, now improved.    Labs and Fingersticks  CAPILLARY BLOOD GLUCOSE  118 (27 Aug 2021 03:00)  134 (27 Aug 2021 00:18)  75 (26 Aug 2021 23:57)  58 (26 Aug 2021 23:33)  128 (26 Aug 2021 22:00)  171 (26 Aug 2021 21:00)  200 (26 Aug 2021 20:00)      POCT Blood Glucose.: 118 mg/dL (27 Aug 2021 03:11)  POCT Blood Glucose.: 134 mg/dL (27 Aug 2021 00:18)  POCT Blood Glucose.: 75 mg/dL (26 Aug 2021 23:57)  POCT Blood Glucose.: 58 mg/dL (26 Aug 2021 23:33)  POCT Blood Glucose.: 128 mg/dL (26 Aug 2021 22:33)  POCT Blood Glucose.: 171 mg/dL (26 Aug 2021 21:07)  POCT Blood Glucose.: 200 mg/dL (26 Aug 2021 20:16)  POCT Blood Glucose.: 170 mg/dL (26 Aug 2021 19:06)  POCT Blood Glucose.: 214 mg/dL (26 Aug 2021 18:12)  POCT Blood Glucose.: 248 mg/dL (26 Aug 2021 16:48)  POCT Blood Glucose.: 238 mg/dL (26 Aug 2021 11:57)      Physical Exam  General: Patient comfortable in bed  Vital Signs Last 12 Hrs  T(F): 97.6 (08-27-21 @ 03:00), Max: 97.6 (08-27-21 @ 01:45)  HR: 60 (08-27-21 @ 06:32) (60 - 60)  BP: 129/71 (08-27-21 @ 06:32) (129/71 - 129/71)  BP(mean): 94 (08-27-21 @ 06:32) (94 - 94)  RR: 18 (08-27-21 @ 06:32) (18 - 18)  SpO2: 100% (08-27-21 @ 06:32) (100% - 100%)  Neck: No palpable thyroid nodules.  CVS: S1S2, No murmurs  Respiratory: No wheezing, no crepitations  GI: Abdomen soft, bowel sounds positive  Musculoskeletal:  edema lower extremities.   Skin: No skin rashes, no ecchymosis    Diagnostics             Chief complaint  Patient is a 65y old  Male who presents with a chief complaint of Cardiac Surgery (25 Aug 2021 18:27)   Review of systems  Patient for CABG today, had hypoglycemia while on IV insulin, now improved.    Labs and Fingersticks  CAPILLARY BLOOD GLUCOSE  118 (27 Aug 2021 03:00)  134 (27 Aug 2021 00:18)  75 (26 Aug 2021 23:57)  58 (26 Aug 2021 23:33)  128 (26 Aug 2021 22:00)  171 (26 Aug 2021 21:00)  200 (26 Aug 2021 20:00)      POCT Blood Glucose.: 118 mg/dL (27 Aug 2021 03:11)  POCT Blood Glucose.: 134 mg/dL (27 Aug 2021 00:18)  POCT Blood Glucose.: 75 mg/dL (26 Aug 2021 23:57)  POCT Blood Glucose.: 58 mg/dL (26 Aug 2021 23:33)  POCT Blood Glucose.: 128 mg/dL (26 Aug 2021 22:33)  POCT Blood Glucose.: 171 mg/dL (26 Aug 2021 21:07)  POCT Blood Glucose.: 200 mg/dL (26 Aug 2021 20:16)  POCT Blood Glucose.: 170 mg/dL (26 Aug 2021 19:06)  POCT Blood Glucose.: 214 mg/dL (26 Aug 2021 18:12)  POCT Blood Glucose.: 248 mg/dL (26 Aug 2021 16:48)  POCT Blood Glucose.: 238 mg/dL (26 Aug 2021 11:57)      Physical Exam  General: Patient comfortable in bed  Vital Signs Last 12 Hrs  T(F): 97.6 (08-27-21 @ 03:00), Max: 97.6 (08-27-21 @ 01:45)  HR: 60 (08-27-21 @ 06:32) (60 - 60)  BP: 129/71 (08-27-21 @ 06:32) (129/71 - 129/71)  BP(mean): 94 (08-27-21 @ 06:32) (94 - 94)  RR: 18 (08-27-21 @ 06:32) (18 - 18)  SpO2: 100% (08-27-21 @ 06:32) (100% - 100%)  Neck: No palpable thyroid nodules.  CVS: S1S2, No murmurs  Respiratory: No wheezing, no crepitations  GI: Abdomen soft, bowel sounds positive  Musculoskeletal:  edema lower extremities.   Skin: No skin rashes, no ecchymosis    Diagnostics

## 2021-08-27 NOTE — BRIEF OPERATIVE NOTE - NSICDXBRIEFPROCEDURE_GEN_ALL_CORE_FT
PROCEDURES:  CABG, with JI 27-Aug-2021 13:42:30  Mirza Schmitt  Endoscopic vein harvest for CABG 27-Aug-2021 13:42:44  Mirza Schmitt

## 2021-08-28 LAB
ALBUMIN SERPL ELPH-MCNC: 3.3 G/DL — SIGNIFICANT CHANGE UP (ref 3.3–5)
ALP SERPL-CCNC: 28 U/L — LOW (ref 40–120)
ALT FLD-CCNC: 14 U/L — SIGNIFICANT CHANGE UP (ref 10–45)
ANION GAP SERPL CALC-SCNC: 11 MMOL/L — SIGNIFICANT CHANGE UP (ref 5–17)
AST SERPL-CCNC: 32 U/L — SIGNIFICANT CHANGE UP (ref 10–40)
BASE EXCESS BLDV CALC-SCNC: -1.4 MMOL/L — SIGNIFICANT CHANGE UP (ref -2–2)
BILIRUB SERPL-MCNC: 0.7 MG/DL — SIGNIFICANT CHANGE UP (ref 0.2–1.2)
BUN SERPL-MCNC: 14 MG/DL — SIGNIFICANT CHANGE UP (ref 7–23)
CALCIUM SERPL-MCNC: 8 MG/DL — LOW (ref 8.4–10.5)
CHLORIDE SERPL-SCNC: 108 MMOL/L — SIGNIFICANT CHANGE UP (ref 96–108)
CO2 BLDV-SCNC: 27 MMOL/L — HIGH (ref 22–26)
CO2 SERPL-SCNC: 21 MMOL/L — LOW (ref 22–31)
CREAT SERPL-MCNC: 0.79 MG/DL — SIGNIFICANT CHANGE UP (ref 0.5–1.3)
GAS PNL BLDA: SIGNIFICANT CHANGE UP
GAS PNL BLDV: SIGNIFICANT CHANGE UP
GLUCOSE BLDC GLUCOMTR-MCNC: 104 MG/DL — HIGH (ref 70–99)
GLUCOSE BLDC GLUCOMTR-MCNC: 107 MG/DL — HIGH (ref 70–99)
GLUCOSE BLDC GLUCOMTR-MCNC: 109 MG/DL — HIGH (ref 70–99)
GLUCOSE BLDC GLUCOMTR-MCNC: 111 MG/DL — HIGH (ref 70–99)
GLUCOSE BLDC GLUCOMTR-MCNC: 113 MG/DL — HIGH (ref 70–99)
GLUCOSE BLDC GLUCOMTR-MCNC: 115 MG/DL — HIGH (ref 70–99)
GLUCOSE BLDC GLUCOMTR-MCNC: 117 MG/DL — HIGH (ref 70–99)
GLUCOSE BLDC GLUCOMTR-MCNC: 118 MG/DL — HIGH (ref 70–99)
GLUCOSE BLDC GLUCOMTR-MCNC: 132 MG/DL — HIGH (ref 70–99)
GLUCOSE BLDC GLUCOMTR-MCNC: 139 MG/DL — HIGH (ref 70–99)
GLUCOSE BLDC GLUCOMTR-MCNC: 144 MG/DL — HIGH (ref 70–99)
GLUCOSE BLDC GLUCOMTR-MCNC: 149 MG/DL — HIGH (ref 70–99)
GLUCOSE BLDC GLUCOMTR-MCNC: 161 MG/DL — HIGH (ref 70–99)
GLUCOSE BLDC GLUCOMTR-MCNC: 186 MG/DL — HIGH (ref 70–99)
GLUCOSE BLDC GLUCOMTR-MCNC: 198 MG/DL — HIGH (ref 70–99)
GLUCOSE BLDC GLUCOMTR-MCNC: 227 MG/DL — HIGH (ref 70–99)
GLUCOSE BLDC GLUCOMTR-MCNC: 228 MG/DL — HIGH (ref 70–99)
GLUCOSE BLDC GLUCOMTR-MCNC: 307 MG/DL — HIGH (ref 70–99)
GLUCOSE SERPL-MCNC: 105 MG/DL — HIGH (ref 70–99)
HCO3 BLDV-SCNC: 25 MMOL/L — SIGNIFICANT CHANGE UP (ref 22–29)
HCT VFR BLD CALC: 23.8 % — LOW (ref 39–50)
HGB BLD-MCNC: 8.2 G/DL — LOW (ref 13–17)
HOROWITZ INDEX BLDV+IHG-RTO: 32 — SIGNIFICANT CHANGE UP
MAGNESIUM SERPL-MCNC: 2.2 MG/DL — SIGNIFICANT CHANGE UP (ref 1.6–2.6)
MCHC RBC-ENTMCNC: 30.6 PG — SIGNIFICANT CHANGE UP (ref 27–34)
MCHC RBC-ENTMCNC: 34.5 GM/DL — SIGNIFICANT CHANGE UP (ref 32–36)
MCV RBC AUTO: 88.8 FL — SIGNIFICANT CHANGE UP (ref 80–100)
NRBC # BLD: 0 /100 WBCS — SIGNIFICANT CHANGE UP (ref 0–0)
PCO2 BLDV: 49 MMHG — SIGNIFICANT CHANGE UP (ref 42–55)
PH BLDV: 7.32 — SIGNIFICANT CHANGE UP (ref 7.32–7.43)
PHOSPHATE SERPL-MCNC: 2.2 MG/DL — LOW (ref 2.5–4.5)
PLATELET # BLD AUTO: 177 K/UL — SIGNIFICANT CHANGE UP (ref 150–400)
PO2 BLDV: 37 MMHG — SIGNIFICANT CHANGE UP (ref 25–45)
POTASSIUM SERPL-MCNC: 4.1 MMOL/L — SIGNIFICANT CHANGE UP (ref 3.5–5.3)
POTASSIUM SERPL-SCNC: 4.1 MMOL/L — SIGNIFICANT CHANGE UP (ref 3.5–5.3)
PROT SERPL-MCNC: 4.8 G/DL — LOW (ref 6–8.3)
RBC # BLD: 2.68 M/UL — LOW (ref 4.2–5.8)
RBC # FLD: 13.3 % — SIGNIFICANT CHANGE UP (ref 10.3–14.5)
SAO2 % BLDV: 69.7 % — SIGNIFICANT CHANGE UP (ref 67–88)
SODIUM SERPL-SCNC: 140 MMOL/L — SIGNIFICANT CHANGE UP (ref 135–145)
WBC # BLD: 6.71 K/UL — SIGNIFICANT CHANGE UP (ref 3.8–10.5)
WBC # FLD AUTO: 6.71 K/UL — SIGNIFICANT CHANGE UP (ref 3.8–10.5)

## 2021-08-28 PROCEDURE — 99291 CRITICAL CARE FIRST HOUR: CPT

## 2021-08-28 PROCEDURE — 93010 ELECTROCARDIOGRAM REPORT: CPT

## 2021-08-28 PROCEDURE — 71045 X-RAY EXAM CHEST 1 VIEW: CPT | Mod: 26

## 2021-08-28 RX ORDER — ACETAMINOPHEN 500 MG
1000 TABLET ORAL ONCE
Refills: 0 | Status: COMPLETED | OUTPATIENT
Start: 2021-08-28 | End: 2021-08-28

## 2021-08-28 RX ORDER — DEXTROSE 50 % IN WATER 50 %
50 SYRINGE (ML) INTRAVENOUS
Refills: 0 | Status: DISCONTINUED | OUTPATIENT
Start: 2021-08-28 | End: 2021-09-06

## 2021-08-28 RX ORDER — OXYCODONE AND ACETAMINOPHEN 5; 325 MG/1; MG/1
2 TABLET ORAL EVERY 6 HOURS
Refills: 0 | Status: DISCONTINUED | OUTPATIENT
Start: 2021-08-28 | End: 2021-08-29

## 2021-08-28 RX ORDER — HYDROMORPHONE HYDROCHLORIDE 2 MG/ML
0.5 INJECTION INTRAMUSCULAR; INTRAVENOUS; SUBCUTANEOUS ONCE
Refills: 0 | Status: DISCONTINUED | OUTPATIENT
Start: 2021-08-28 | End: 2021-08-28

## 2021-08-28 RX ORDER — ALBUMIN HUMAN 25 %
250 VIAL (ML) INTRAVENOUS ONCE
Refills: 0 | Status: COMPLETED | OUTPATIENT
Start: 2021-08-28 | End: 2021-08-28

## 2021-08-28 RX ORDER — OXYCODONE AND ACETAMINOPHEN 5; 325 MG/1; MG/1
1 TABLET ORAL EVERY 6 HOURS
Refills: 0 | Status: DISCONTINUED | OUTPATIENT
Start: 2021-08-28 | End: 2021-08-29

## 2021-08-28 RX ORDER — HYDROMORPHONE HYDROCHLORIDE 2 MG/ML
1 INJECTION INTRAMUSCULAR; INTRAVENOUS; SUBCUTANEOUS ONCE
Refills: 0 | Status: DISCONTINUED | OUTPATIENT
Start: 2021-08-28 | End: 2021-08-28

## 2021-08-28 RX ORDER — PANTOPRAZOLE SODIUM 20 MG/1
40 TABLET, DELAYED RELEASE ORAL
Refills: 0 | Status: DISCONTINUED | OUTPATIENT
Start: 2021-08-28 | End: 2021-09-06

## 2021-08-28 RX ORDER — INSULIN GLARGINE 100 [IU]/ML
15 INJECTION, SOLUTION SUBCUTANEOUS AT BEDTIME
Refills: 0 | Status: DISCONTINUED | OUTPATIENT
Start: 2021-08-28 | End: 2021-08-29

## 2021-08-28 RX ORDER — INSULIN LISPRO 100/ML
5 VIAL (ML) SUBCUTANEOUS
Refills: 0 | Status: DISCONTINUED | OUTPATIENT
Start: 2021-08-28 | End: 2021-08-28

## 2021-08-28 RX ORDER — POLYETHYLENE GLYCOL 3350 17 G/17G
17 POWDER, FOR SOLUTION ORAL DAILY
Refills: 0 | Status: DISCONTINUED | OUTPATIENT
Start: 2021-08-28 | End: 2021-09-06

## 2021-08-28 RX ORDER — ATORVASTATIN CALCIUM 80 MG/1
80 TABLET, FILM COATED ORAL AT BEDTIME
Refills: 0 | Status: DISCONTINUED | OUTPATIENT
Start: 2021-08-28 | End: 2021-09-06

## 2021-08-28 RX ORDER — INSULIN HUMAN 100 [IU]/ML
3 INJECTION, SOLUTION SUBCUTANEOUS
Qty: 100 | Refills: 0 | Status: DISCONTINUED | OUTPATIENT
Start: 2021-08-28 | End: 2021-08-30

## 2021-08-28 RX ORDER — DOBUTAMINE HCL 250MG/20ML
1.25 VIAL (ML) INTRAVENOUS
Qty: 500 | Refills: 0 | Status: DISCONTINUED | OUTPATIENT
Start: 2021-08-28 | End: 2021-08-28

## 2021-08-28 RX ORDER — FUROSEMIDE 40 MG
20 TABLET ORAL ONCE
Refills: 0 | Status: COMPLETED | OUTPATIENT
Start: 2021-08-28 | End: 2021-08-28

## 2021-08-28 RX ORDER — ASPIRIN/CALCIUM CARB/MAGNESIUM 324 MG
325 TABLET ORAL DAILY
Refills: 0 | Status: DISCONTINUED | OUTPATIENT
Start: 2021-08-28 | End: 2021-09-06

## 2021-08-28 RX ORDER — INSULIN LISPRO 100/ML
VIAL (ML) SUBCUTANEOUS
Refills: 0 | Status: DISCONTINUED | OUTPATIENT
Start: 2021-08-28 | End: 2021-08-30

## 2021-08-28 RX ORDER — INSULIN GLARGINE 100 [IU]/ML
10 INJECTION, SOLUTION SUBCUTANEOUS ONCE
Refills: 0 | Status: COMPLETED | OUTPATIENT
Start: 2021-08-28 | End: 2021-08-28

## 2021-08-28 RX ORDER — DEXTROSE 50 % IN WATER 50 %
25 SYRINGE (ML) INTRAVENOUS
Refills: 0 | Status: DISCONTINUED | OUTPATIENT
Start: 2021-08-28 | End: 2021-09-06

## 2021-08-28 RX ORDER — INSULIN GLARGINE 100 [IU]/ML
10 INJECTION, SOLUTION SUBCUTANEOUS EVERY MORNING
Refills: 0 | Status: DISCONTINUED | OUTPATIENT
Start: 2021-08-28 | End: 2021-08-28

## 2021-08-28 RX ORDER — ENOXAPARIN SODIUM 100 MG/ML
40 INJECTION SUBCUTANEOUS DAILY
Refills: 0 | Status: DISCONTINUED | OUTPATIENT
Start: 2021-08-28 | End: 2021-09-06

## 2021-08-28 RX ORDER — INSULIN LISPRO 100/ML
5 VIAL (ML) SUBCUTANEOUS
Refills: 0 | Status: DISCONTINUED | OUTPATIENT
Start: 2021-08-28 | End: 2021-08-29

## 2021-08-28 RX ORDER — POTASSIUM CHLORIDE 20 MEQ
10 PACKET (EA) ORAL
Refills: 0 | Status: COMPLETED | OUTPATIENT
Start: 2021-08-28 | End: 2021-08-28

## 2021-08-28 RX ADMIN — Medication 100 MILLIGRAM(S): at 04:26

## 2021-08-28 RX ADMIN — OXYCODONE AND ACETAMINOPHEN 2 TABLET(S): 5; 325 TABLET ORAL at 11:59

## 2021-08-28 RX ADMIN — Medication 125 MILLILITER(S): at 01:23

## 2021-08-28 RX ADMIN — Medication 62.5 MILLIMOLE(S): at 02:03

## 2021-08-28 RX ADMIN — HYDROMORPHONE HYDROCHLORIDE 0.5 MILLIGRAM(S): 2 INJECTION INTRAMUSCULAR; INTRAVENOUS; SUBCUTANEOUS at 04:45

## 2021-08-28 RX ADMIN — Medication 1000 MILLIGRAM(S): at 18:30

## 2021-08-28 RX ADMIN — Medication 10 MILLIGRAM(S): at 13:45

## 2021-08-28 RX ADMIN — INSULIN GLARGINE 15 UNIT(S): 100 INJECTION, SOLUTION SUBCUTANEOUS at 22:31

## 2021-08-28 RX ADMIN — Medication 20 MILLIGRAM(S): at 05:04

## 2021-08-28 RX ADMIN — HYDROMORPHONE HYDROCHLORIDE 1 MILLIGRAM(S): 2 INJECTION INTRAMUSCULAR; INTRAVENOUS; SUBCUTANEOUS at 15:45

## 2021-08-28 RX ADMIN — OXYCODONE AND ACETAMINOPHEN 2 TABLET(S): 5; 325 TABLET ORAL at 16:36

## 2021-08-28 RX ADMIN — ENOXAPARIN SODIUM 40 MILLIGRAM(S): 100 INJECTION SUBCUTANEOUS at 22:30

## 2021-08-28 RX ADMIN — CHLORHEXIDINE GLUCONATE 1 APPLICATION(S): 213 SOLUTION TOPICAL at 06:03

## 2021-08-28 RX ADMIN — INSULIN HUMAN 3 UNIT(S)/HR: 100 INJECTION, SOLUTION SUBCUTANEOUS at 22:30

## 2021-08-28 RX ADMIN — Medication 10: at 16:12

## 2021-08-28 RX ADMIN — OXYCODONE AND ACETAMINOPHEN 2 TABLET(S): 5; 325 TABLET ORAL at 11:04

## 2021-08-28 RX ADMIN — Medication 20 MILLIGRAM(S): at 16:30

## 2021-08-28 RX ADMIN — POLYETHYLENE GLYCOL 3350 17 GRAM(S): 17 POWDER, FOR SOLUTION ORAL at 11:06

## 2021-08-28 RX ADMIN — Medication 10 MILLIGRAM(S): at 05:04

## 2021-08-28 RX ADMIN — Medication 100 MILLIEQUIVALENT(S): at 02:03

## 2021-08-28 RX ADMIN — Medication 100 MILLIEQUIVALENT(S): at 01:22

## 2021-08-28 RX ADMIN — ATORVASTATIN CALCIUM 80 MILLIGRAM(S): 80 TABLET, FILM COATED ORAL at 22:31

## 2021-08-28 RX ADMIN — PANTOPRAZOLE SODIUM 40 MILLIGRAM(S): 20 TABLET, DELAYED RELEASE ORAL at 11:03

## 2021-08-28 RX ADMIN — Medication 325 MILLIGRAM(S): at 02:19

## 2021-08-28 RX ADMIN — Medication 100 MILLIGRAM(S): at 18:32

## 2021-08-28 RX ADMIN — OXYCODONE AND ACETAMINOPHEN 2 TABLET(S): 5; 325 TABLET ORAL at 17:15

## 2021-08-28 RX ADMIN — Medication 400 MILLIGRAM(S): at 18:03

## 2021-08-28 RX ADMIN — Medication 5 UNIT(S): at 16:12

## 2021-08-28 RX ADMIN — HYDROMORPHONE HYDROCHLORIDE 1 MILLIGRAM(S): 2 INJECTION INTRAMUSCULAR; INTRAVENOUS; SUBCUTANEOUS at 08:15

## 2021-08-28 RX ADMIN — ATORVASTATIN CALCIUM 80 MILLIGRAM(S): 80 TABLET, FILM COATED ORAL at 02:19

## 2021-08-28 RX ADMIN — HYDROMORPHONE HYDROCHLORIDE 1 MILLIGRAM(S): 2 INJECTION INTRAMUSCULAR; INTRAVENOUS; SUBCUTANEOUS at 08:11

## 2021-08-28 RX ADMIN — HYDROMORPHONE HYDROCHLORIDE 0.5 MILLIGRAM(S): 2 INJECTION INTRAMUSCULAR; INTRAVENOUS; SUBCUTANEOUS at 04:38

## 2021-08-28 RX ADMIN — INSULIN GLARGINE 10 UNIT(S): 100 INJECTION, SOLUTION SUBCUTANEOUS at 12:01

## 2021-08-28 RX ADMIN — HYDROMORPHONE HYDROCHLORIDE 1 MILLIGRAM(S): 2 INJECTION INTRAMUSCULAR; INTRAVENOUS; SUBCUTANEOUS at 15:34

## 2021-08-28 RX ADMIN — Medication 10 MILLIGRAM(S): at 22:31

## 2021-08-28 RX ADMIN — Medication 100 MILLIGRAM(S): at 10:15

## 2021-08-28 NOTE — PHYSICAL THERAPY INITIAL EVALUATION ADULT - PERTINENT HX OF CURRENT PROBLEM, REHAB EVAL
Pt is a 65 year old male admitted with c/o chest discomfort with exertion. PMH: T2DM (last HgbA1c 8.1, managed by PCP, complicated by peripheral neuropathy), HLD, HTN, BPH presents for RHC/LHC. Patient c/o chest discomfort with exertion that has been becoming progressively worse and resolves with rest associated with SOB.

## 2021-08-28 NOTE — PHYSICAL THERAPY INITIAL EVALUATION ADULT - PRECAUTIONS/LIMITATIONS, REHAB EVAL
CONTINUED: Chest pain resolves with rest. Patient recently went to [facility] ER on 7/29 and patient states results were normal. Patient seen and evaluated by Dr. Celestin and presents for further evaluation. Pt underwent CABGx3 for CAD (8/27)./cardiac precautions/sternal precautions/surgical precautions

## 2021-08-28 NOTE — AIRWAY REMOVAL NOTE  ADULT & PEDS - ARTIFICAL AIRWAY REMOVAL COMMENTS
Written order for extubation verified. The patient was identified by full name and birth date compared to the identification band.  Present during the procedure was CAROL Rubio

## 2021-08-28 NOTE — PHYSICAL THERAPY INITIAL EVALUATION ADULT - GENERAL OBSERVATIONS, REHAB EVAL
Received sitting in chair +Viviana, +chest tubex2, +, +supplemental O2, +ICU monitoring. Pt A&OX4, follows 100% of commands.

## 2021-08-28 NOTE — PHYSICAL THERAPY INITIAL EVALUATION ADULT - ADDITIONAL COMMENTS
Pt lives alone in a PH +8 steps to enter and 8 steps up/down once inside. Pt was independent with all mobility prior to admission. Pt recently retired as a . Pt wears glasses for distance.

## 2021-08-28 NOTE — PROGRESS NOTE ADULT - SUBJECTIVE AND OBJECTIVE BOX
Chief complaint    Patient is a 65y old  Male who presents with a chief complaint of C3L (28 Aug 2021 09:00)   Review of systems  Patient in bed, appears comfortable.    Labs and Fingersticks  CAPILLARY BLOOD GLUCOSE  161 (28 Aug 2021 11:00)  149 (28 Aug 2021 10:00)  115 (28 Aug 2021 09:00)  117 (28 Aug 2021 08:00)  118 (28 Aug 2021 07:00)  113 (28 Aug 2021 03:00)  105 (28 Aug 2021 02:00)  107 (28 Aug 2021 00:00)  110 (27 Aug 2021 23:00)  153 (27 Aug 2021 21:00)  172 (27 Aug 2021 20:00)  176 (27 Aug 2021 19:00)  186 (27 Aug 2021 18:00)  176 (27 Aug 2021 17:00)  108 (27 Aug 2021 15:00)      POCT Blood Glucose.: 144 mg/dL (28 Aug 2021 12:05)  POCT Blood Glucose.: 161 mg/dL (28 Aug 2021 11:10)  POCT Blood Glucose.: 149 mg/dL (28 Aug 2021 10:19)  POCT Blood Glucose.: 115 mg/dL (28 Aug 2021 08:48)  POCT Blood Glucose.: 117 mg/dL (28 Aug 2021 08:15)  POCT Blood Glucose.: 118 mg/dL (28 Aug 2021 06:51)  POCT Blood Glucose.: 132 mg/dL (28 Aug 2021 06:14)  POCT Blood Glucose.: 107 mg/dL (28 Aug 2021 04:59)  POCT Blood Glucose.: 109 mg/dL (28 Aug 2021 04:29)  POCT Blood Glucose.: 113 mg/dL (28 Aug 2021 02:58)  POCT Blood Glucose.: 104 mg/dL (28 Aug 2021 01:56)  POCT Blood Glucose.: 111 mg/dL (28 Aug 2021 01:05)  POCT Blood Glucose.: 107 mg/dL (27 Aug 2021 23:54)  POCT Blood Glucose.: 110 mg/dL (27 Aug 2021 22:51)  POCT Blood Glucose.: 114 mg/dL (27 Aug 2021 22:13)  POCT Blood Glucose.: 153 mg/dL (27 Aug 2021 20:54)  POCT Blood Glucose.: 172 mg/dL (27 Aug 2021 19:53)  POCT Blood Glucose.: 186 mg/dL (27 Aug 2021 18:04)      Anion Gap, Serum: 11 (08-28 @ 00:06)  Anion Gap, Serum: 11 (08-27 @ 15:38)      Calcium, Total Serum: 8.0 *L* (08-28 @ 00:06)  Calcium, Total Serum: 7.5 *L* (08-27 @ 15:38)  Albumin, Serum: 3.3 (08-28 @ 00:06)  Albumin, Serum: 2.3 *L* (08-27 @ 15:38)    Alanine Aminotransferase (ALT/SGPT): 14 (08-28 @ 00:06)  Alanine Aminotransferase (ALT/SGPT): 12 (08-27 @ 15:38)  Alkaline Phosphatase, Serum: 28 *L* (08-28 @ 00:06)  Alkaline Phosphatase, Serum: 30 *L* (08-27 @ 15:38)  Aspartate Aminotransferase (AST/SGOT): 32 (08-28 @ 00:06)  Aspartate Aminotransferase (AST/SGOT): 34 (08-27 @ 15:38)        08-28    140  |  108  |  14  ----------------------------<  105<H>  4.1   |  21<L>  |  0.79    Ca    8.0<L>      28 Aug 2021 00:06  Phos  2.2     08-28  Mg     2.2     08-28    TPro  4.8<L>  /  Alb  3.3  /  TBili  0.7  /  DBili  x   /  AST  32  /  ALT  14  /  AlkPhos  28<L>  08-28                        8.2    6.71  )-----------( 177      ( 28 Aug 2021 00:06 )             23.8     Medications  MEDICATIONS  (STANDING):  aspirin enteric coated 325 milliGRAM(s) Oral daily  atorvastatin 80 milliGRAM(s) Oral at bedtime  cefuroxime  IVPB 1500 milliGRAM(s) IV Intermittent every 8 hours  chlorhexidine 2% Cloths 1 Application(s) Topical <User Schedule>  enoxaparin Injectable 40 milliGRAM(s) SubCutaneous daily  insulin glargine Injectable (LANTUS) 15 Unit(s) SubCutaneous at bedtime  insulin glargine Injectable (LANTUS) 10 Unit(s) SubCutaneous once  insulin lispro (ADMELOG) corrective regimen sliding scale   SubCutaneous Before meals and at bedtime  insulin lispro Injectable (ADMELOG) 5 Unit(s) SubCutaneous before breakfast  insulin lispro Injectable (ADMELOG) 5 Unit(s) SubCutaneous before lunch  insulin lispro Injectable (ADMELOG) 5 Unit(s) SubCutaneous before dinner  metoclopramide Injectable 10 milliGRAM(s) IV Push every 8 hours  norepinephrine Infusion 0.04 MICROgram(s)/kG/Min (6.47 mL/Hr) IV Continuous <Continuous>  pantoprazole    Tablet 40 milliGRAM(s) Oral before breakfast  polyethylene glycol 3350 17 Gram(s) Oral daily  vasopressin Infusion 0.033 Unit(s)/Min (2 mL/Hr) IV Continuous <Continuous>      Physical Exam  General: Patient comfortable in bed  Vital Signs Last 12 Hrs  T(F): 98.8 (08-28-21 @ 11:00), Max: 99.7 (08-28-21 @ 07:00)  HR: 75 (08-28-21 @ 11:45) (73 - 86)  BP: --  BP(mean): --  RR: 19 (08-28-21 @ 11:45) (7 - 31)  SpO2: 100% (08-28-21 @ 11:45) (96% - 100%)  Neck: No palpable thyroid nodules.  CVS: S1S2, No murmurs  Respiratory: No wheezing, no crepitations  GI: Abdomen soft, bowel sounds positive  Musculoskeletal:  edema lower extremities.     Diagnostics

## 2021-08-28 NOTE — PROGRESS NOTE ADULT - ASSESSMENT
Assessment  DMT2: 65y Male with DM T2 with hyperglycemia, A1C 7.1%, was on several oral meds at home, transitioned to IV insulin due to hyperglycemia, s/p surgery, blood sugars improved and in acceptable range, started eating meals, no new hypoglycemias.  CAD: on medications, no chest pain, stable, monitored.  HTN: Controlled,  on antihypertensive medications.  Obesity: No strict exercise routines, not on any weight loss program, neither on low calorie diet.        Cb Hill MD  Cell: [phone]  Office: [phone]

## 2021-08-28 NOTE — PROGRESS NOTE ADULT - SUBJECTIVE AND OBJECTIVE BOX
Patient seen and examined at the bedside.    Remained critically ill on continuous ICU monitoring.    OBJECTIVE:  ICU Vital Signs Last 24 Hrs  T(C): 36.9 (28 Aug 2021 04:00), Max: 37.8 (28 Aug 2021 00:00)  T(F): 98.4 (28 Aug 2021 04:00), Max: 100 (28 Aug 2021 00:00)  HR: 84 (28 Aug 2021 06:45) (71 - 90)  BP: --  BP(mean): --  ABP: 114/46 (28 Aug 2021 06:45) (93/51 - 178/86)  ABP(mean): 66 (28 Aug 2021 06:45) (64 - 126)  RR: 11 (28 Aug 2021 06:45) (7 - 49)  SpO2: 100% (28 Aug 2021 06:45) (99% - 100%)      REVIEW OF SYSTEMS:    [x] Unable to assess ROS due to sedation and intubation    Physical Exam:  General: Sedated and intubated with multiple lines, gtt, & tubes  Neurology: Sedated  Eyes: EOMI,   ENT/Neck: Neck supple, + ET  midline, trachea midline, No JVD, Gross hearing intact  Respiratory:Rales noted bilaterally.  CV: RRR, S1S2, no murmurs, rubs or gallops        [x] Sternal dressing, [x] Mediastinal CT, [x] Pleural CT,         [x] Sinus rhythm,   Abdominal: Soft, NT, ND +BS,   Extremities: 1-2+ pedal edema noted, + peripheral pulses  Skin: No Rashes, Hematoma, Ecchymosis                         LINES:  [x] Arterial Line   [x] Central Line   [x] Ventilator      Assessment:    65 year old male Hx of T2DM (last HgbA1c 8.1complicated by peripheral neuropathy), HLD, Hypertriglyceridemia,  HTN, and BPH.  S/P cath with multivessel CAD / TTE moderate LV systolic segmental dysfunction     S/P CABG X3  POD# 0  Post op hypovolemia / labile BP /   Acute Blood Loss Anemia S/P  3u PRBC, 2 plt, and 5u cryo   DM2 w hyperglycemia     Plan:   ***Neuro***    [x] Sedated    Woke up post op moved all 4     ***Cardiovascular***  Post CABG invasive hemodynamic monitoring   SR / no Tachy or bradyarrhythmias / no back up pacing   Labile hemodynamics , requiring Levo gtt for MAP >65  Hemodynamically significant hypovolemia   Low CVP cont volume loading & reassessing hemodynamics / serial lactates   Hct 24% Transfused 1 PRBC   Monitor CT outputs   Start ASA / Statins / Gemfibrozil        ***Pulmonary***  Post op vent management   Morbid Obesity BMI: 30.7  At risk for post op respiratory insufficiency               Will plan to wean & extubate once pt is hemodynamically stable.   SPo2 / ABG / CXR     ***GI***  Stress ulcer prophylaxis: [x] Protonix    NPO after recent procedure, advance diet as tolerated.     ***Renal***  BPH   Continue to monitor I/Os, BUN/Creatinine.   Replete lytes PRN. Keep K> 4 and Mg >2.  Proscar / Flomax     ***ID***  Afebrile, WBC within normal limits.   Continue Cefuroxime for perioperative antibiotic coverage.    ***Endocrine***  [x] Hyperglycemia  [x] DM2 HbA1c 7.1%             - [x] Insulin gtt               - Need tight glycemic control to prevent wound infection.    Patient requires continuous monitoring with bedside rhythm monitoring, pulse oximetry monitoring, and continuous central venous and arterial pressure monitoring; and intermittent blood gas analysis. Care plan discussed with the ICU care team.   Patient remained critical, at risk for life threatening decompensation.    I have spent 30 minutes providing critical care management to this patient.

## 2021-08-29 LAB
ALBUMIN SERPL ELPH-MCNC: 3.5 G/DL — SIGNIFICANT CHANGE UP (ref 3.3–5)
ALP SERPL-CCNC: 40 U/L — SIGNIFICANT CHANGE UP (ref 40–120)
ALT FLD-CCNC: 16 U/L — SIGNIFICANT CHANGE UP (ref 10–45)
ANION GAP SERPL CALC-SCNC: 11 MMOL/L — SIGNIFICANT CHANGE UP (ref 5–17)
AST SERPL-CCNC: 27 U/L — SIGNIFICANT CHANGE UP (ref 10–40)
BILIRUB SERPL-MCNC: 0.5 MG/DL — SIGNIFICANT CHANGE UP (ref 0.2–1.2)
BUN SERPL-MCNC: 22 MG/DL — SIGNIFICANT CHANGE UP (ref 7–23)
CALCIUM SERPL-MCNC: 8.3 MG/DL — LOW (ref 8.4–10.5)
CHLORIDE SERPL-SCNC: 104 MMOL/L — SIGNIFICANT CHANGE UP (ref 96–108)
CO2 SERPL-SCNC: 21 MMOL/L — LOW (ref 22–31)
CREAT SERPL-MCNC: 1.13 MG/DL — SIGNIFICANT CHANGE UP (ref 0.5–1.3)
GAS PNL BLDA: SIGNIFICANT CHANGE UP
GLUCOSE BLDC GLUCOMTR-MCNC: 120 MG/DL — HIGH (ref 70–99)
GLUCOSE BLDC GLUCOMTR-MCNC: 162 MG/DL — HIGH (ref 70–99)
GLUCOSE BLDC GLUCOMTR-MCNC: 169 MG/DL — HIGH (ref 70–99)
GLUCOSE BLDC GLUCOMTR-MCNC: 193 MG/DL — HIGH (ref 70–99)
GLUCOSE BLDC GLUCOMTR-MCNC: 225 MG/DL — HIGH (ref 70–99)
GLUCOSE BLDC GLUCOMTR-MCNC: 234 MG/DL — HIGH (ref 70–99)
GLUCOSE SERPL-MCNC: 157 MG/DL — HIGH (ref 70–99)
HCT VFR BLD CALC: 26.7 % — LOW (ref 39–50)
HGB BLD-MCNC: 8.9 G/DL — LOW (ref 13–17)
MAGNESIUM SERPL-MCNC: 2.3 MG/DL — SIGNIFICANT CHANGE UP (ref 1.6–2.6)
MCHC RBC-ENTMCNC: 30 PG — SIGNIFICANT CHANGE UP (ref 27–34)
MCHC RBC-ENTMCNC: 33.3 GM/DL — SIGNIFICANT CHANGE UP (ref 32–36)
MCV RBC AUTO: 89.9 FL — SIGNIFICANT CHANGE UP (ref 80–100)
NRBC # BLD: 0 /100 WBCS — SIGNIFICANT CHANGE UP (ref 0–0)
PHOSPHATE SERPL-MCNC: 2 MG/DL — LOW (ref 2.5–4.5)
PLATELET # BLD AUTO: 158 K/UL — SIGNIFICANT CHANGE UP (ref 150–400)
POTASSIUM SERPL-MCNC: 3.9 MMOL/L — SIGNIFICANT CHANGE UP (ref 3.5–5.3)
POTASSIUM SERPL-SCNC: 3.9 MMOL/L — SIGNIFICANT CHANGE UP (ref 3.5–5.3)
PROT SERPL-MCNC: 5.7 G/DL — LOW (ref 6–8.3)
RBC # BLD: 2.97 M/UL — LOW (ref 4.2–5.8)
RBC # FLD: 14.4 % — SIGNIFICANT CHANGE UP (ref 10.3–14.5)
SODIUM SERPL-SCNC: 136 MMOL/L — SIGNIFICANT CHANGE UP (ref 135–145)
WBC # BLD: 8.54 K/UL — SIGNIFICANT CHANGE UP (ref 3.8–10.5)
WBC # FLD AUTO: 8.54 K/UL — SIGNIFICANT CHANGE UP (ref 3.8–10.5)

## 2021-08-29 PROCEDURE — 93308 TTE F-UP OR LMTD: CPT | Mod: 26

## 2021-08-29 PROCEDURE — 71045 X-RAY EXAM CHEST 1 VIEW: CPT | Mod: 26

## 2021-08-29 PROCEDURE — 74018 RADEX ABDOMEN 1 VIEW: CPT | Mod: 26

## 2021-08-29 PROCEDURE — 93010 ELECTROCARDIOGRAM REPORT: CPT

## 2021-08-29 PROCEDURE — 99291 CRITICAL CARE FIRST HOUR: CPT

## 2021-08-29 PROCEDURE — 93321 DOPPLER ECHO F-UP/LMTD STD: CPT | Mod: 26

## 2021-08-29 RX ORDER — INSULIN GLARGINE 100 [IU]/ML
20 INJECTION, SOLUTION SUBCUTANEOUS AT BEDTIME
Refills: 0 | Status: DISCONTINUED | OUTPATIENT
Start: 2021-08-29 | End: 2021-08-29

## 2021-08-29 RX ORDER — METOPROLOL TARTRATE 50 MG
25 TABLET ORAL EVERY 12 HOURS
Refills: 0 | Status: DISCONTINUED | OUTPATIENT
Start: 2021-08-29 | End: 2021-08-30

## 2021-08-29 RX ORDER — METOCLOPRAMIDE HCL 10 MG
10 TABLET ORAL EVERY 8 HOURS
Refills: 0 | Status: COMPLETED | OUTPATIENT
Start: 2021-08-29 | End: 2021-08-30

## 2021-08-29 RX ORDER — GABAPENTIN 400 MG/1
300 CAPSULE ORAL THREE TIMES A DAY
Refills: 0 | Status: DISCONTINUED | OUTPATIENT
Start: 2021-08-29 | End: 2021-09-06

## 2021-08-29 RX ORDER — ACETAMINOPHEN 500 MG
1000 TABLET ORAL ONCE
Refills: 0 | Status: COMPLETED | OUTPATIENT
Start: 2021-08-29 | End: 2021-08-29

## 2021-08-29 RX ORDER — FINASTERIDE 5 MG/1
5 TABLET, FILM COATED ORAL DAILY
Refills: 0 | Status: DISCONTINUED | OUTPATIENT
Start: 2021-08-29 | End: 2021-09-06

## 2021-08-29 RX ORDER — POTASSIUM CHLORIDE 20 MEQ
10 PACKET (EA) ORAL
Refills: 0 | Status: COMPLETED | OUTPATIENT
Start: 2021-08-29 | End: 2021-08-29

## 2021-08-29 RX ORDER — INSULIN LISPRO 100/ML
8 VIAL (ML) SUBCUTANEOUS
Refills: 0 | Status: DISCONTINUED | OUTPATIENT
Start: 2021-08-29 | End: 2021-08-30

## 2021-08-29 RX ORDER — SENNA PLUS 8.6 MG/1
2 TABLET ORAL AT BEDTIME
Refills: 0 | Status: DISCONTINUED | OUTPATIENT
Start: 2021-08-29 | End: 2021-09-06

## 2021-08-29 RX ORDER — INSULIN GLARGINE 100 [IU]/ML
24 INJECTION, SOLUTION SUBCUTANEOUS AT BEDTIME
Refills: 0 | Status: DISCONTINUED | OUTPATIENT
Start: 2021-08-29 | End: 2021-09-01

## 2021-08-29 RX ORDER — ACETAMINOPHEN 500 MG
650 TABLET ORAL EVERY 6 HOURS
Refills: 0 | Status: DISCONTINUED | OUTPATIENT
Start: 2021-08-29 | End: 2021-09-06

## 2021-08-29 RX ORDER — INSULIN LISPRO 100/ML
7 VIAL (ML) SUBCUTANEOUS
Refills: 0 | Status: DISCONTINUED | OUTPATIENT
Start: 2021-08-29 | End: 2021-08-29

## 2021-08-29 RX ORDER — TAMSULOSIN HYDROCHLORIDE 0.4 MG/1
0.4 CAPSULE ORAL AT BEDTIME
Refills: 0 | Status: DISCONTINUED | OUTPATIENT
Start: 2021-08-29 | End: 2021-09-06

## 2021-08-29 RX ORDER — GEMFIBROZIL 600 MG
600 TABLET ORAL
Refills: 0 | Status: DISCONTINUED | OUTPATIENT
Start: 2021-08-29 | End: 2021-09-06

## 2021-08-29 RX ORDER — OXYCODONE HYDROCHLORIDE 5 MG/1
5 TABLET ORAL EVERY 6 HOURS
Refills: 0 | Status: DISCONTINUED | OUTPATIENT
Start: 2021-08-29 | End: 2021-09-04

## 2021-08-29 RX ORDER — OXYCODONE HYDROCHLORIDE 5 MG/1
10 TABLET ORAL EVERY 6 HOURS
Refills: 0 | Status: DISCONTINUED | OUTPATIENT
Start: 2021-08-29 | End: 2021-08-29

## 2021-08-29 RX ADMIN — TAMSULOSIN HYDROCHLORIDE 0.4 MILLIGRAM(S): 0.4 CAPSULE ORAL at 21:58

## 2021-08-29 RX ADMIN — GABAPENTIN 300 MILLIGRAM(S): 400 CAPSULE ORAL at 21:59

## 2021-08-29 RX ADMIN — GABAPENTIN 300 MILLIGRAM(S): 400 CAPSULE ORAL at 15:08

## 2021-08-29 RX ADMIN — Medication 10 MILLIGRAM(S): at 06:06

## 2021-08-29 RX ADMIN — OXYCODONE AND ACETAMINOPHEN 2 TABLET(S): 5; 325 TABLET ORAL at 00:45

## 2021-08-29 RX ADMIN — OXYCODONE HYDROCHLORIDE 5 MILLIGRAM(S): 5 TABLET ORAL at 22:30

## 2021-08-29 RX ADMIN — OXYCODONE HYDROCHLORIDE 5 MILLIGRAM(S): 5 TABLET ORAL at 22:07

## 2021-08-29 RX ADMIN — Medication 1000 MILLIGRAM(S): at 09:30

## 2021-08-29 RX ADMIN — SENNA PLUS 2 TABLET(S): 8.6 TABLET ORAL at 21:59

## 2021-08-29 RX ADMIN — Medication 600 MILLIGRAM(S): at 17:11

## 2021-08-29 RX ADMIN — Medication 400 MILLIGRAM(S): at 08:33

## 2021-08-29 RX ADMIN — Medication 50 MILLIEQUIVALENT(S): at 04:00

## 2021-08-29 RX ADMIN — Medication 5 UNIT(S): at 06:44

## 2021-08-29 RX ADMIN — TAMSULOSIN HYDROCHLORIDE 0.4 MILLIGRAM(S): 0.4 CAPSULE ORAL at 06:05

## 2021-08-29 RX ADMIN — Medication 1000 MILLIGRAM(S): at 15:38

## 2021-08-29 RX ADMIN — Medication 100 MILLIGRAM(S): at 02:44

## 2021-08-29 RX ADMIN — Medication 2: at 21:59

## 2021-08-29 RX ADMIN — OXYCODONE AND ACETAMINOPHEN 2 TABLET(S): 5; 325 TABLET ORAL at 01:45

## 2021-08-29 RX ADMIN — Medication 50 MILLIEQUIVALENT(S): at 04:30

## 2021-08-29 RX ADMIN — ATORVASTATIN CALCIUM 80 MILLIGRAM(S): 80 TABLET, FILM COATED ORAL at 22:00

## 2021-08-29 RX ADMIN — CHLORHEXIDINE GLUCONATE 1 APPLICATION(S): 213 SOLUTION TOPICAL at 05:59

## 2021-08-29 RX ADMIN — Medication 325 MILLIGRAM(S): at 12:19

## 2021-08-29 RX ADMIN — Medication 83.33 MILLIMOLE(S): at 03:00

## 2021-08-29 RX ADMIN — Medication 7 UNIT(S): at 12:21

## 2021-08-29 RX ADMIN — FINASTERIDE 5 MILLIGRAM(S): 5 TABLET, FILM COATED ORAL at 06:05

## 2021-08-29 RX ADMIN — INSULIN GLARGINE 24 UNIT(S): 100 INJECTION, SOLUTION SUBCUTANEOUS at 21:59

## 2021-08-29 RX ADMIN — Medication 10 MILLIGRAM(S): at 21:00

## 2021-08-29 RX ADMIN — Medication 6: at 06:44

## 2021-08-29 RX ADMIN — Medication 10 MILLIGRAM(S): at 15:08

## 2021-08-29 RX ADMIN — Medication 25 MILLIGRAM(S): at 17:11

## 2021-08-29 RX ADMIN — POLYETHYLENE GLYCOL 3350 17 GRAM(S): 17 POWDER, FOR SOLUTION ORAL at 12:19

## 2021-08-29 RX ADMIN — Medication 10: at 12:21

## 2021-08-29 RX ADMIN — Medication 10: at 16:24

## 2021-08-29 RX ADMIN — PANTOPRAZOLE SODIUM 40 MILLIGRAM(S): 20 TABLET, DELAYED RELEASE ORAL at 06:06

## 2021-08-29 RX ADMIN — Medication 7 UNIT(S): at 16:24

## 2021-08-29 RX ADMIN — ENOXAPARIN SODIUM 40 MILLIGRAM(S): 100 INJECTION SUBCUTANEOUS at 12:18

## 2021-08-29 RX ADMIN — Medication 400 MILLIGRAM(S): at 15:08

## 2021-08-29 NOTE — PROGRESS NOTE ADULT - SUBJECTIVE AND OBJECTIVE BOX
CRITICAL CARE ATTENDING - CTICU    MEDICATIONS  (STANDING):  aspirin enteric coated 325 milliGRAM(s) Oral daily  atorvastatin 80 milliGRAM(s) Oral at bedtime  chlorhexidine 2% Cloths 1 Application(s) Topical <User Schedule>  dextrose 50% Injectable 50 milliLiter(s) IV Push every 15 minutes  dextrose 50% Injectable 25 milliLiter(s) IV Push every 15 minutes  enoxaparin Injectable 40 milliGRAM(s) SubCutaneous daily  finasteride 5 milliGRAM(s) Oral daily  gemfibrozil 600 milliGRAM(s) Oral two times a day  insulin glargine Injectable (LANTUS) 15 Unit(s) SubCutaneous at bedtime  insulin lispro (ADMELOG) corrective regimen sliding scale   SubCutaneous Before meals and at bedtime  insulin lispro Injectable (ADMELOG) 5 Unit(s) SubCutaneous three times a day before meals  insulin regular Infusion 3 Unit(s)/Hr (3 mL/Hr) IV Continuous <Continuous>  metoclopramide Injectable 10 milliGRAM(s) IV Push every 8 hours  pantoprazole    Tablet 40 milliGRAM(s) Oral before breakfast  polyethylene glycol 3350 17 Gram(s) Oral daily  senna 2 Tablet(s) Oral at bedtime  tamsulosin 0.4 milliGRAM(s) Oral at bedtime  vasopressin Infusion 0.033 Unit(s)/Min (2 mL/Hr) IV Continuous <Continuous>                                    8.9    8.54  )-----------( 158      ( 29 Aug 2021 00:45 )             26.7       08-29    136  |  104  |  22  ----------------------------<  157<H>  3.9   |  21<L>  |  1.13    Ca    8.3<L>      29 Aug 2021 00:45  Phos  2.0     08-29  Mg     2.3     08-29    TPro  5.7<L>  /  Alb  3.5  /  TBili  0.5  /  DBili  x   /  AST  27  /  ALT  16  /  AlkPhos  40  08-29      PT/INR - ( 27 Aug 2021 15:37 )   PT: 19.8 sec;   INR: 1.69 ratio         PTT - ( 27 Aug 2021 15:37 )  PTT:29.7 sec        Daily     Daily       08-28 @ 07:01 - 08-29 @ 07:00  --------------------------------------------------------  IN: 1206.2 mL / OUT: 1560 mL / NET: -353.8 mL        Critically Ill patient  : [ ] preoperative ,   [ x] post operative    Requires :  [x Arterial Line   [x ] Central Line  [ ] PA catheter  [ ] IABP  [ ] ECMO  [ ] LVAD  [ ] Ventilator  [ ] pacemaker [ ] Impella.                      [ ] ABG's     [ ] Pulse Oxymetry Monitoring  Bedside evaluation , monitoring , treatment of hemodynamics , fluids , IVP/ IVCD meds.        Diagnosis:     POD 2 - CABG X 3 L     Hypotension    Hypovolemia     Hemodynamic lability,  instability. Requires IVCD [ x] vasopressors [ ] inotropes  [ ] vasodilator  [x ]IVSS fluid  to maintain MAP, perfusion, C.I.     DM - IVCD insulin - conversion to Lantus + Admelog     Requires bedside physical therapy, mobilization and total prison care.     TTE today     Obesity OHS / MICAH     ECG     Chest Tube Drainage                     Discussed with CT surgeon, Physician's Assistant - Nurse Practitioner- Critical care medicine team.   Discussed at  AM / PM rounds.   Chart, labs , films reviewed.    Cumulative Critical Care Time Given Today : 30 min

## 2021-08-30 DIAGNOSIS — I10 ESSENTIAL (PRIMARY) HYPERTENSION: ICD-10-CM

## 2021-08-30 DIAGNOSIS — I25.10 ATHEROSCLEROTIC HEART DISEASE OF NATIVE CORONARY ARTERY WITHOUT ANGINA PECTORIS: ICD-10-CM

## 2021-08-30 LAB
ALBUMIN SERPL ELPH-MCNC: 3.2 G/DL — LOW (ref 3.3–5)
ALP SERPL-CCNC: 47 U/L — SIGNIFICANT CHANGE UP (ref 40–120)
ALT FLD-CCNC: 17 U/L — SIGNIFICANT CHANGE UP (ref 10–45)
ANION GAP SERPL CALC-SCNC: 9 MMOL/L — SIGNIFICANT CHANGE UP (ref 5–17)
AST SERPL-CCNC: 22 U/L — SIGNIFICANT CHANGE UP (ref 10–40)
BILIRUB SERPL-MCNC: 0.5 MG/DL — SIGNIFICANT CHANGE UP (ref 0.2–1.2)
BUN SERPL-MCNC: 17 MG/DL — SIGNIFICANT CHANGE UP (ref 7–23)
CALCIUM SERPL-MCNC: 8.4 MG/DL — SIGNIFICANT CHANGE UP (ref 8.4–10.5)
CHLORIDE SERPL-SCNC: 107 MMOL/L — SIGNIFICANT CHANGE UP (ref 96–108)
CO2 SERPL-SCNC: 23 MMOL/L — SIGNIFICANT CHANGE UP (ref 22–31)
CREAT SERPL-MCNC: 0.9 MG/DL — SIGNIFICANT CHANGE UP (ref 0.5–1.3)
GAS PNL BLDA: SIGNIFICANT CHANGE UP
GLUCOSE BLDC GLUCOMTR-MCNC: 119 MG/DL — HIGH (ref 70–99)
GLUCOSE BLDC GLUCOMTR-MCNC: 123 MG/DL — HIGH (ref 70–99)
GLUCOSE BLDC GLUCOMTR-MCNC: 139 MG/DL — HIGH (ref 70–99)
GLUCOSE BLDC GLUCOMTR-MCNC: 140 MG/DL — HIGH (ref 70–99)
GLUCOSE BLDC GLUCOMTR-MCNC: 205 MG/DL — HIGH (ref 70–99)
GLUCOSE SERPL-MCNC: 73 MG/DL — SIGNIFICANT CHANGE UP (ref 70–99)
HCT VFR BLD CALC: 24.4 % — LOW (ref 39–50)
HGB BLD-MCNC: 8 G/DL — LOW (ref 13–17)
MAGNESIUM SERPL-MCNC: 2.3 MG/DL — SIGNIFICANT CHANGE UP (ref 1.6–2.6)
MCHC RBC-ENTMCNC: 30.2 PG — SIGNIFICANT CHANGE UP (ref 27–34)
MCHC RBC-ENTMCNC: 32.8 GM/DL — SIGNIFICANT CHANGE UP (ref 32–36)
MCV RBC AUTO: 92.1 FL — SIGNIFICANT CHANGE UP (ref 80–100)
NRBC # BLD: 0 /100 WBCS — SIGNIFICANT CHANGE UP (ref 0–0)
PHOSPHATE SERPL-MCNC: 1.5 MG/DL — LOW (ref 2.5–4.5)
PLATELET # BLD AUTO: 149 K/UL — LOW (ref 150–400)
POTASSIUM SERPL-MCNC: 3.6 MMOL/L — SIGNIFICANT CHANGE UP (ref 3.5–5.3)
POTASSIUM SERPL-SCNC: 3.6 MMOL/L — SIGNIFICANT CHANGE UP (ref 3.5–5.3)
PROT SERPL-MCNC: 5.5 G/DL — LOW (ref 6–8.3)
RBC # BLD: 2.65 M/UL — LOW (ref 4.2–5.8)
RBC # FLD: 14.2 % — SIGNIFICANT CHANGE UP (ref 10.3–14.5)
SODIUM SERPL-SCNC: 139 MMOL/L — SIGNIFICANT CHANGE UP (ref 135–145)
WBC # BLD: 7.51 K/UL — SIGNIFICANT CHANGE UP (ref 3.8–10.5)
WBC # FLD AUTO: 7.51 K/UL — SIGNIFICANT CHANGE UP (ref 3.8–10.5)

## 2021-08-30 PROCEDURE — 99233 SBSQ HOSP IP/OBS HIGH 50: CPT

## 2021-08-30 PROCEDURE — 71045 X-RAY EXAM CHEST 1 VIEW: CPT | Mod: 26,76

## 2021-08-30 RX ORDER — MAGNESIUM SULFATE 500 MG/ML
1 VIAL (ML) INJECTION ONCE
Refills: 0 | Status: COMPLETED | OUTPATIENT
Start: 2021-08-30 | End: 2021-08-30

## 2021-08-30 RX ORDER — INSULIN LISPRO 100/ML
12 VIAL (ML) SUBCUTANEOUS
Refills: 0 | Status: DISCONTINUED | OUTPATIENT
Start: 2021-08-30 | End: 2021-08-31

## 2021-08-30 RX ORDER — ALBUMIN HUMAN 25 %
250 VIAL (ML) INTRAVENOUS ONCE
Refills: 0 | Status: COMPLETED | OUTPATIENT
Start: 2021-08-30 | End: 2021-08-30

## 2021-08-30 RX ORDER — FUROSEMIDE 40 MG
20 TABLET ORAL ONCE
Refills: 0 | Status: COMPLETED | OUTPATIENT
Start: 2021-08-30 | End: 2021-08-30

## 2021-08-30 RX ORDER — GLUCAGON INJECTION, SOLUTION 0.5 MG/.1ML
1 INJECTION, SOLUTION SUBCUTANEOUS ONCE
Refills: 0 | Status: DISCONTINUED | OUTPATIENT
Start: 2021-08-30 | End: 2021-09-06

## 2021-08-30 RX ORDER — POTASSIUM CHLORIDE 20 MEQ
10 PACKET (EA) ORAL
Refills: 0 | Status: COMPLETED | OUTPATIENT
Start: 2021-08-30 | End: 2021-08-30

## 2021-08-30 RX ORDER — INSULIN LISPRO 100/ML
VIAL (ML) SUBCUTANEOUS AT BEDTIME
Refills: 0 | Status: DISCONTINUED | OUTPATIENT
Start: 2021-08-30 | End: 2021-09-06

## 2021-08-30 RX ORDER — DEXTROSE 50 % IN WATER 50 %
25 SYRINGE (ML) INTRAVENOUS ONCE
Refills: 0 | Status: COMPLETED | OUTPATIENT
Start: 2021-08-30 | End: 2021-08-30

## 2021-08-30 RX ORDER — METOPROLOL TARTRATE 50 MG
12.5 TABLET ORAL EVERY 12 HOURS
Refills: 0 | Status: DISCONTINUED | OUTPATIENT
Start: 2021-08-30 | End: 2021-08-31

## 2021-08-30 RX ORDER — ACETAMINOPHEN 500 MG
1000 TABLET ORAL ONCE
Refills: 0 | Status: COMPLETED | OUTPATIENT
Start: 2021-08-30 | End: 2021-08-30

## 2021-08-30 RX ORDER — DEXTROSE 50 % IN WATER 50 %
15 SYRINGE (ML) INTRAVENOUS ONCE
Refills: 0 | Status: DISCONTINUED | OUTPATIENT
Start: 2021-08-30 | End: 2021-09-06

## 2021-08-30 RX ORDER — INSULIN LISPRO 100/ML
VIAL (ML) SUBCUTANEOUS
Refills: 0 | Status: DISCONTINUED | OUTPATIENT
Start: 2021-08-30 | End: 2021-09-06

## 2021-08-30 RX ADMIN — Medication 125 MILLILITER(S): at 08:40

## 2021-08-30 RX ADMIN — Medication 600 MILLIGRAM(S): at 05:59

## 2021-08-30 RX ADMIN — OXYCODONE HYDROCHLORIDE 5 MILLIGRAM(S): 5 TABLET ORAL at 05:57

## 2021-08-30 RX ADMIN — Medication 25 MILLIGRAM(S): at 05:57

## 2021-08-30 RX ADMIN — Medication 8 UNIT(S): at 12:15

## 2021-08-30 RX ADMIN — TAMSULOSIN HYDROCHLORIDE 0.4 MILLIGRAM(S): 0.4 CAPSULE ORAL at 21:23

## 2021-08-30 RX ADMIN — OXYCODONE HYDROCHLORIDE 5 MILLIGRAM(S): 5 TABLET ORAL at 17:48

## 2021-08-30 RX ADMIN — PANTOPRAZOLE SODIUM 40 MILLIGRAM(S): 20 TABLET, DELAYED RELEASE ORAL at 05:57

## 2021-08-30 RX ADMIN — Medication 1000 MILLIGRAM(S): at 08:00

## 2021-08-30 RX ADMIN — Medication 12 UNIT(S): at 16:45

## 2021-08-30 RX ADMIN — GABAPENTIN 300 MILLIGRAM(S): 400 CAPSULE ORAL at 05:57

## 2021-08-30 RX ADMIN — Medication 400 MILLIGRAM(S): at 06:54

## 2021-08-30 RX ADMIN — Medication 8: at 12:14

## 2021-08-30 RX ADMIN — SENNA PLUS 2 TABLET(S): 8.6 TABLET ORAL at 21:23

## 2021-08-30 RX ADMIN — Medication 25 MILLILITER(S): at 00:47

## 2021-08-30 RX ADMIN — Medication 50 MILLIEQUIVALENT(S): at 01:30

## 2021-08-30 RX ADMIN — Medication 2: at 06:56

## 2021-08-30 RX ADMIN — Medication 85 MILLIMOLE(S): at 03:00

## 2021-08-30 RX ADMIN — Medication 325 MILLIGRAM(S): at 11:13

## 2021-08-30 RX ADMIN — INSULIN GLARGINE 24 UNIT(S): 100 INJECTION, SOLUTION SUBCUTANEOUS at 21:24

## 2021-08-30 RX ADMIN — GABAPENTIN 300 MILLIGRAM(S): 400 CAPSULE ORAL at 21:23

## 2021-08-30 RX ADMIN — POLYETHYLENE GLYCOL 3350 17 GRAM(S): 17 POWDER, FOR SOLUTION ORAL at 11:13

## 2021-08-30 RX ADMIN — Medication 10 MILLIGRAM(S): at 13:17

## 2021-08-30 RX ADMIN — Medication 600 MILLIGRAM(S): at 16:45

## 2021-08-30 RX ADMIN — ATORVASTATIN CALCIUM 80 MILLIGRAM(S): 80 TABLET, FILM COATED ORAL at 21:24

## 2021-08-30 RX ADMIN — Medication 8 UNIT(S): at 06:56

## 2021-08-30 RX ADMIN — Medication 50 MILLIEQUIVALENT(S): at 03:52

## 2021-08-30 RX ADMIN — ENOXAPARIN SODIUM 40 MILLIGRAM(S): 100 INJECTION SUBCUTANEOUS at 12:02

## 2021-08-30 RX ADMIN — OXYCODONE HYDROCHLORIDE 5 MILLIGRAM(S): 5 TABLET ORAL at 16:59

## 2021-08-30 RX ADMIN — Medication 50 MILLIEQUIVALENT(S): at 02:00

## 2021-08-30 RX ADMIN — Medication 100 GRAM(S): at 02:19

## 2021-08-30 RX ADMIN — Medication 12.5 MILLIGRAM(S): at 16:51

## 2021-08-30 RX ADMIN — CHLORHEXIDINE GLUCONATE 1 APPLICATION(S): 213 SOLUTION TOPICAL at 05:45

## 2021-08-30 RX ADMIN — Medication 10 MILLIGRAM(S): at 05:58

## 2021-08-30 RX ADMIN — Medication 125 MILLILITER(S): at 09:30

## 2021-08-30 RX ADMIN — GABAPENTIN 300 MILLIGRAM(S): 400 CAPSULE ORAL at 13:17

## 2021-08-30 RX ADMIN — Medication 20 MILLIGRAM(S): at 02:19

## 2021-08-30 RX ADMIN — OXYCODONE HYDROCHLORIDE 5 MILLIGRAM(S): 5 TABLET ORAL at 06:30

## 2021-08-30 RX ADMIN — FINASTERIDE 5 MILLIGRAM(S): 5 TABLET, FILM COATED ORAL at 11:13

## 2021-08-30 NOTE — PROGRESS NOTE ADULT - SUBJECTIVE AND OBJECTIVE BOX
Chief complaint  Patient is a 65y old  Male who presents with a chief complaint of CABG (30 Aug 2021 06:36)   Review of systems  Patient awake in chair, looks comfortable, no hypoglycemic episodes.    Labs and Fingersticks  CAPILLARY BLOOD GLUCOSE      POCT Blood Glucose.: 205 mg/dL (30 Aug 2021 12:00)  POCT Blood Glucose.: 123 mg/dL (30 Aug 2021 06:52)  POCT Blood Glucose.: 119 mg/dL (30 Aug 2021 01:09)  POCT Blood Glucose.: 120 mg/dL (29 Aug 2021 21:55)  POCT Blood Glucose.: 225 mg/dL (29 Aug 2021 16:22)      Anion Gap, Serum: 9 (08-30 @ 00:29)  Anion Gap, Serum: 11 (08-29 @ 00:45)      Calcium, Total Serum: 8.4 (08-30 @ 00:29)  Calcium, Total Serum: 8.3 *L* (08-29 @ 00:45)  Albumin, Serum: 3.2 *L* (08-30 @ 00:29)  Albumin, Serum: 3.5 (08-29 @ 00:45)    Alanine Aminotransferase (ALT/SGPT): 17 (08-30 @ 00:29)  Alanine Aminotransferase (ALT/SGPT): 16 (08-29 @ 00:45)  Alkaline Phosphatase, Serum: 47 (08-30 @ 00:29)  Alkaline Phosphatase, Serum: 40 (08-29 @ 00:45)  Aspartate Aminotransferase (AST/SGOT): 22 (08-30 @ 00:29)  Aspartate Aminotransferase (AST/SGOT): 27 (08-29 @ 00:45)        08-30    139  |  107  |  17  ----------------------------<  73  3.6   |  23  |  0.90    Ca    8.4      30 Aug 2021 00:29  Phos  1.5     08-30  Mg     2.3     08-30    TPro  5.5<L>  /  Alb  3.2<L>  /  TBili  0.5  /  DBili  x   /  AST  22  /  ALT  17  /  AlkPhos  47  08-30                        8.0    7.51  )-----------( 149      ( 30 Aug 2021 00:29 )             24.4     Medications  MEDICATIONS  (STANDING):  aspirin enteric coated 325 milliGRAM(s) Oral daily  atorvastatin 80 milliGRAM(s) Oral at bedtime  dextrose 40% Gel 15 Gram(s) Oral once  dextrose 50% Injectable 50 milliLiter(s) IV Push every 15 minutes  dextrose 50% Injectable 25 milliLiter(s) IV Push every 15 minutes  enoxaparin Injectable 40 milliGRAM(s) SubCutaneous daily  finasteride 5 milliGRAM(s) Oral daily  gabapentin 300 milliGRAM(s) Oral three times a day  gemfibrozil 600 milliGRAM(s) Oral two times a day  glucagon  Injectable 1 milliGRAM(s) IntraMuscular once  insulin glargine Injectable (LANTUS) 24 Unit(s) SubCutaneous at bedtime  insulin lispro (ADMELOG) corrective regimen sliding scale   SubCutaneous three times a day before meals  insulin lispro (ADMELOG) corrective regimen sliding scale   SubCutaneous at bedtime  insulin lispro Injectable (ADMELOG) 12 Unit(s) SubCutaneous three times a day before meals  metoprolol tartrate 12.5 milliGRAM(s) Oral every 12 hours  pantoprazole    Tablet 40 milliGRAM(s) Oral before breakfast  polyethylene glycol 3350 17 Gram(s) Oral daily  senna 2 Tablet(s) Oral at bedtime  tamsulosin 0.4 milliGRAM(s) Oral at bedtime      Physical Exam  General: Patient comfortable in bed  Vital Signs Last 12 Hrs  T(F): 98.1 (08-30-21 @ 13:45), Max: 99.9 (08-30-21 @ 08:00)  HR: 71 (08-30-21 @ 14:54) (62 - 81)  BP: 100/54 (08-30-21 @ 14:54) (76/46 - 111/58)  BP(mean): 66 (08-30-21 @ 10:30) (56 - 79)  RR: 18 (08-30-21 @ 13:45) (14 - 25)  SpO2: 97% (08-30-21 @ 14:54) (97% - 100%)  Neck: No palpable thyroid nodules.  CVS: S1S2, No murmurs  Respiratory: No wheezing, no crepitations  GI: Abdomen soft, bowel sounds positive  Musculoskeletal:  edema lower extremities.   Skin: No skin rashes, no ecchymosis    Diagnostics             Chief complaint  Patient is a 65y old  Male who presents with a chief complaint of CABG (30 Aug 2021 06:36)   Review of systems  Patient awake in chair, looks comfortable, no hypoglycemic episodes.    Labs and Fingersticks  CAPILLARY BLOOD GLUCOSE      POCT Blood Glucose.: 205 mg/dL (30 Aug 2021 12:00)  POCT Blood Glucose.: 123 mg/dL (30 Aug 2021 06:52)  POCT Blood Glucose.: 119 mg/dL (30 Aug 2021 01:09)  POCT Blood Glucose.: 120 mg/dL (29 Aug 2021 21:55)  POCT Blood Glucose.: 225 mg/dL (29 Aug 2021 16:22)      Anion Gap, Serum: 9 (08-30 @ 00:29)  Anion Gap, Serum: 11 (08-29 @ 00:45)      Calcium, Total Serum: 8.4 (08-30 @ 00:29)  Calcium, Total Serum: 8.3 *L* (08-29 @ 00:45)  Albumin, Serum: 3.2 *L* (08-30 @ 00:29)  Albumin, Serum: 3.5 (08-29 @ 00:45)    Alanine Aminotransferase (ALT/SGPT): 17 (08-30 @ 00:29)  Alanine Aminotransferase (ALT/SGPT): 16 (08-29 @ 00:45)  Alkaline Phosphatase, Serum: 47 (08-30 @ 00:29)  Alkaline Phosphatase, Serum: 40 (08-29 @ 00:45)  Aspartate Aminotransferase (AST/SGOT): 22 (08-30 @ 00:29)  Aspartate Aminotransferase (AST/SGOT): 27 (08-29 @ 00:45)        08-30    139  |  107  |  17  ----------------------------<  73  3.6   |  23  |  0.90    Ca    8.4      30 Aug 2021 00:29  Phos  1.5     08-30  Mg     2.3     08-30    TPro  5.5<L>  /  Alb  3.2<L>  /  TBili  0.5  /  DBili  x   /  AST  22  /  ALT  17  /  AlkPhos  47  08-30                        8.0    7.51  )-----------( 149      ( 30 Aug 2021 00:29 )             24.4     Medications  MEDICATIONS  (STANDING):  aspirin enteric coated 325 milliGRAM(s) Oral daily  atorvastatin 80 milliGRAM(s) Oral at bedtime  dextrose 40% Gel 15 Gram(s) Oral once  dextrose 50% Injectable 50 milliLiter(s) IV Push every 15 minutes  dextrose 50% Injectable 25 milliLiter(s) IV Push every 15 minutes  enoxaparin Injectable 40 milliGRAM(s) SubCutaneous daily  finasteride 5 milliGRAM(s) Oral daily  gabapentin 300 milliGRAM(s) Oral three times a day  gemfibrozil 600 milliGRAM(s) Oral two times a day  glucagon  Injectable 1 milliGRAM(s) IntraMuscular once  insulin glargine Injectable (LANTUS) 24 Unit(s) SubCutaneous at bedtime  insulin lispro (ADMELOG) corrective regimen sliding scale   SubCutaneous three times a day before meals  insulin lispro (ADMELOG) corrective regimen sliding scale   SubCutaneous at bedtime  insulin lispro Injectable (ADMELOG) 12 Unit(s) SubCutaneous three times a day before meals  metoprolol tartrate 12.5 milliGRAM(s) Oral every 12 hours  pantoprazole    Tablet 40 milliGRAM(s) Oral before breakfast  polyethylene glycol 3350 17 Gram(s) Oral daily  senna 2 Tablet(s) Oral at bedtime  tamsulosin 0.4 milliGRAM(s) Oral at bedtime      Physical Exam  General: Patient comfortable in bed  Vital Signs Last 12 Hrs  T(F): 98.1 (08-30-21 @ 13:45), Max: 99.9 (08-30-21 @ 08:00)  HR: 71 (08-30-21 @ 14:54) (62 - 81)  BP: 100/54 (08-30-21 @ 14:54) (76/46 - 111/58)  BP(mean): 66 (08-30-21 @ 10:30) (56 - 79)  RR: 18 (08-30-21 @ 13:45) (14 - 25)  SpO2: 97% (08-30-21 @ 14:54) (97% - 100%)  Neck: No palpable thyroid nodules.  CVS: S1S2, No murmurs  Respiratory: No wheezing, no crepitations  GI: Abdomen soft, bowel sounds positive  Musculoskeletal:  edema lower extremities.   Skin: No skin rashes, no ecchymosis    Diagnostics

## 2021-08-30 NOTE — PROGRESS NOTE ADULT - SUBJECTIVE AND OBJECTIVE BOX
Transfer from CTU    Operation / Date: CABG x 3 EF NL 8/27/21    SUBJECTIVE ASSESSMENT:  65y Male seen and examined. Feels well, offers no acute complaints, ambulating, using IS pulling 750cc, tolerating PO Diet, + Flatus, no bm. denies fever, chest pain, palpitations, SOB, abdominal pain, n/v.         Vital Signs Last 24 Hrs  T(C): 36.9 (30 Aug 2021 10:57), Max: 37.8 (29 Aug 2021 16:00)  T(F): 98.4 (30 Aug 2021 10:57), Max: 100 (29 Aug 2021 16:00)  HR: 62 (30 Aug 2021 10:57) (62 - 88)  BP: 92/57 (30 Aug 2021 10:57) (76/46 - 154/50)  BP(mean): 66 (30 Aug 2021 10:30) (56 - 79)  RR: 16 (30 Aug 2021 10:57) (12 - 31)  SpO2: 99% (30 Aug 2021 10:57) (99% - 100%)  I&O's Detail    29 Aug 2021 07:01  -  30 Aug 2021 07:00  --------------------------------------------------------  IN:    IV PiggyBack: 400 mL    IV PiggyBack: 499.8 mL    Oral Fluid: 800 mL  Total IN: 1699.8 mL    OUT:    Chest Tube (mL): 170 mL    Chest Tube (mL): 220 mL    Indwelling Catheter - Urethral (mL): 2865 mL    Insulin: 0 mL    Vasopressin: 0 mL  Total OUT: 3255 mL    Total NET: -1555.2 mL      30 Aug 2021 07:01  -  30 Aug 2021 12:33  --------------------------------------------------------  IN:    Albumin 5%  - 250 mL: 250 mL    IV PiggyBack: 249.9 mL    Oral Fluid: 240 mL  Total IN: 739.9 mL    OUT:    Indwelling Catheter - Urethral (mL): 65 mL    Voided (mL): 60 mL  Total OUT: 125 mL    Total NET: 614.9 mL          CHEST TUBE:  /No  DILIP DRAIN:  No.  EPICARDIAL WIRES: Yes  TIE DOWNS: Yes.  MARTIN: No.    PHYSICAL EXAM:    General: Patient lying comfortably in bed, no acute distress     Neurological: Alert and oriented. No focal neurological deficits     Cardiovascular: S1S2, RRR, no murmurs appreciated on exam     Respiratory: Clear to ausculation bilaterally, no wheeze/rhonchi/rales    Gastrointestinal: + BS, soft, non tender, non distended     Extremities: Warm and well perfused. No edema, no calf tenderness     Vascular: palpable peripheral pulses b/l     Incision Sites: Surgical dressing in place, CDI, no click.     LABS:                        8.0    7.51  )-----------( 149      ( 30 Aug 2021 00:29 )             24.4       COUMADIN: NO        08-30    139  |  107  |  17  ----------------------------<  73  3.6   |  23  |  0.90    Ca    8.4      30 Aug 2021 00:29  Phos  1.5     08-30  Mg     2.3     08-30    TPro  5.5<L>  /  Alb  3.2<L>  /  TBili  0.5  /  DBili  x   /  AST  22  /  ALT  17  /  AlkPhos  47  08-30          MEDICATIONS  (STANDING):  aspirin enteric coated 325 milliGRAM(s) Oral daily  atorvastatin 80 milliGRAM(s) Oral at bedtime  dextrose 50% Injectable 50 milliLiter(s) IV Push every 15 minutes  dextrose 50% Injectable 25 milliLiter(s) IV Push every 15 minutes  enoxaparin Injectable 40 milliGRAM(s) SubCutaneous daily  finasteride 5 milliGRAM(s) Oral daily  gabapentin 300 milliGRAM(s) Oral three times a day  gemfibrozil 600 milliGRAM(s) Oral two times a day  insulin glargine Injectable (LANTUS) 24 Unit(s) SubCutaneous at bedtime  insulin lispro (ADMELOG) corrective regimen sliding scale   SubCutaneous Before meals and at bedtime  insulin lispro Injectable (ADMELOG) 8 Unit(s) SubCutaneous three times a day before meals  metoclopramide Injectable 10 milliGRAM(s) IV Push every 8 hours  metoprolol tartrate 12.5 milliGRAM(s) Oral every 12 hours  pantoprazole    Tablet 40 milliGRAM(s) Oral before breakfast  polyethylene glycol 3350 17 Gram(s) Oral daily  senna 2 Tablet(s) Oral at bedtime  tamsulosin 0.4 milliGRAM(s) Oral at bedtime    MEDICATIONS  (PRN):  acetaminophen   Tablet .. 650 milliGRAM(s) Oral every 6 hours PRN Temp greater or equal to 38C (100.4F), Mild Pain (1 - 3)  bisacodyl Suppository 10 milliGRAM(s) Rectal daily PRN Constipation  oxyCODONE    IR 5 milliGRAM(s) Oral every 6 hours PRN Moderate Pain (4 - 6)  sodium chloride 0.9% lock flush 10 milliLiter(s) IV Push every 1 hour PRN Pre/post blood products, medications, blood draw, and to maintain line patency        RADIOLOGY & ADDITIONAL TESTS:

## 2021-08-30 NOTE — PROGRESS NOTE ADULT - PROBLEM SELECTOR PLAN 1
s/p CABG x 3, POD3, EF NL  -continue asa 325mg daily, atorvastatin 80mg qhs, metoprolol 12.5mg BID  -volume resuscitated in ICU for orthostasis, now resolved, asymptomatic.  -ambulation, coughing, deep breathing, incentive spirometry encouraged  -wean to room air. s/p CABG x 3, POD3, EF NL  -continue asa 325mg daily, atorvastatin 80mg qhs, metoprolol 12.5mg BID  -volume resuscitated in ICU for orthostasis, now resolved, asymptomatic.  -ambulation, coughing, deep breathing, incentive spirometry encouraged  -wean to room air.  -Acute Blood Loss Anemia S/P 3u PRBC, 2 plt, and 5u cryo, H&H this Am 8/24

## 2021-08-30 NOTE — PROGRESS NOTE ADULT - PROBLEM SELECTOR PLAN 2
A1c 8, Continue lantus 24U QHS, Humalog 8U TID and iSS  -may require insulin for discharge  -endocrine team following.

## 2021-08-30 NOTE — PROGRESS NOTE ADULT - SUBJECTIVE AND OBJECTIVE BOX
Final Anesthesia Post-op Assessment    Patient: Crystal Wang  Procedure(s) Performed: REMOVAL OLD INTERSTIM LEAD/IPG REPLACE WITH MRI SAFE LEAD RECHARGABLE IPG  Anesthesia type: MAC    Vitals Value Taken Time   Temp 98 12/23/20 1559   Pulse 69 12/23/20 1559   Resp 12 12/23/20 1559   SpO2 99 12/23/20 1559   /63 12/23/20 1559         Post-op Vital Signs:stable  Level of Consciousness: participates in exam and answers questions appropriately  Respiratory Status: spontaneous ventilation and unassisted  Cardiovascular blood pressure returned to baseline  Hydration: euvolemic  Pain Management: adequately controlled  Handoff: Handoff to receiving nurse was performed and questions were answered  Vomiting: none   Nausea: None  Airway Patency:patent  Post-op Assessment: awake, alert, appropriately conversant, or baseline, no complications and patient tolerated procedure well with no complications  Comments: VS noted,are stable, and are documented on anesthesia record.      No complications documented.    CRITICAL CARE ATTENDING - CTICU    MEDICATIONS  (STANDING):  aspirin enteric coated 325 milliGRAM(s) Oral daily  atorvastatin 80 milliGRAM(s) Oral at bedtime  chlorhexidine 2% Cloths 1 Application(s) Topical <User Schedule>  dextrose 50% Injectable 50 milliLiter(s) IV Push every 15 minutes  dextrose 50% Injectable 25 milliLiter(s) IV Push every 15 minutes  enoxaparin Injectable 40 milliGRAM(s) SubCutaneous daily  finasteride 5 milliGRAM(s) Oral daily  gabapentin 300 milliGRAM(s) Oral three times a day  gemfibrozil 600 milliGRAM(s) Oral two times a day  insulin glargine Injectable (LANTUS) 24 Unit(s) SubCutaneous at bedtime  insulin lispro (ADMELOG) corrective regimen sliding scale   SubCutaneous Before meals and at bedtime  insulin lispro Injectable (ADMELOG) 8 Unit(s) SubCutaneous three times a day before meals  metoclopramide Injectable 10 milliGRAM(s) IV Push every 8 hours  metoprolol tartrate 25 milliGRAM(s) Oral every 12 hours  pantoprazole    Tablet 40 milliGRAM(s) Oral before breakfast  polyethylene glycol 3350 17 Gram(s) Oral daily  senna 2 Tablet(s) Oral at bedtime  tamsulosin 0.4 milliGRAM(s) Oral at bedtime                                    8.0    7.51  )-----------( 149      ( 30 Aug 2021 00:29 )             24.4       08-30    139  |  107  |  17  ----------------------------<  73  3.6   |  23  |  0.90    Ca    8.4      30 Aug 2021 00:29  Phos  1.5     08-30  Mg     2.3     08-30    TPro  5.5<L>  /  Alb  3.2<L>  /  TBili  0.5  /  DBili  x   /  AST  22  /  ALT  17  /  AlkPhos  47  08-30              Daily     Daily Weight in k.2 (30 Aug 2021 00:00)      -28 @ 07:01  -   @ 07:00  --------------------------------------------------------  IN: 1206.2 mL / OUT: 1560 mL / NET: -353.8 mL     @ 07: @ 06:37  --------------------------------------------------------  IN: 1449.9 mL / OUT: 2805 mL / NET: -1355.1 mL      Critically Ill patient  : [ ] preoperative ,   [ x] post operative    Requires :  [x Arterial Line   [x ] Central Line  [ ] PA catheter  [ ] IABP  [ ] ECMO  [ ] LVAD  [ ] Ventilator  [ ] pacemaker [ ] Impella.                      [ ] ABG's     [ ] Pulse Oxymetry Monitoring  Bedside evaluation , monitoring , treatment of hemodynamics , fluids , IVP/ IVCD meds.        Diagnosis:     POD 3 - CABG X 3 L     Chest Tube Drainage     Requires chest PT, pulmonary toilet, suctioning to maintain SaO2,  patent airway and treat atelectasis.     Hypovolemia     DM 2 - Lantus + Admelog     Requires bedside physical therapy, mobilization and total FDC care.     CHF- acute [ x]   chronic [ x]    systolic [ x]   diatolic [ ]          - Echo- EF -       60%     [ x] RV dysfunction          - Cxr-cardiomegally, edema          - Clinical-  [ ]inotropes   [ ]pressors   [ ]diuresis   [ ]IABP   [ ]ECMO   [ ]LVAD   [ ]Respiratory Failure.     Obesity OHS / MICHA     ECG     Thrombocytopenia           By signing my name below, I, Rozina Archibald, attest that this documentation has been prepared under the direction and in the presence of Gaetano Brewer MD.   Electronically Signed: Santhosh Donnelly 21 @ 06:37      Discussed with CT surgeon, Physician Assistant - Nurse Practitioner- Critical care medicine team.   Discussed at  AM / PM rounds.   Chart, labs , films reviewed.    Cumulative Critical Care Time Given Today:  CRITICAL CARE ATTENDING - CTICU    MEDICATIONS  (STANDING):  aspirin enteric coated 325 milliGRAM(s) Oral daily  atorvastatin 80 milliGRAM(s) Oral at bedtime  chlorhexidine 2% Cloths 1 Application(s) Topical <User Schedule>  dextrose 50% Injectable 50 milliLiter(s) IV Push every 15 minutes  dextrose 50% Injectable 25 milliLiter(s) IV Push every 15 minutes  enoxaparin Injectable 40 milliGRAM(s) SubCutaneous daily  finasteride 5 milliGRAM(s) Oral daily  gabapentin 300 milliGRAM(s) Oral three times a day  gemfibrozil 600 milliGRAM(s) Oral two times a day  insulin glargine Injectable (LANTUS) 24 Unit(s) SubCutaneous at bedtime  insulin lispro (ADMELOG) corrective regimen sliding scale   SubCutaneous Before meals and at bedtime  insulin lispro Injectable (ADMELOG) 8 Unit(s) SubCutaneous three times a day before meals  metoclopramide Injectable 10 milliGRAM(s) IV Push every 8 hours  metoprolol tartrate 25 milliGRAM(s) Oral every 12 hours  pantoprazole    Tablet 40 milliGRAM(s) Oral before breakfast  polyethylene glycol 3350 17 Gram(s) Oral daily  senna 2 Tablet(s) Oral at bedtime  tamsulosin 0.4 milliGRAM(s) Oral at bedtime                                    8.0    7.51  )-----------( 149      ( 30 Aug 2021 00:29 )             24.4       08-30    139  |  107  |  17  ----------------------------<  73  3.6   |  23  |  0.90    Ca    8.4      30 Aug 2021 00:29  Phos  1.5     08-30  Mg     2.3     08-30    TPro  5.5<L>  /  Alb  3.2<L>  /  TBili  0.5  /  DBili  x   /  AST  22  /  ALT  17  /  AlkPhos  47  08-30              Daily     Daily Weight in k.2 (30 Aug 2021 00:00)      -28 @ 07:01  -   @ 07:00  --------------------------------------------------------  IN: 1206.2 mL / OUT: 1560 mL / NET: -353.8 mL     @ 07: @ 06:37  --------------------------------------------------------  IN: 1449.9 mL / OUT: 2805 mL / NET: -1355.1 mL      Critically Ill patient  : [ ] preoperative ,   [ x] post operative    Requires :  [x Arterial Line   [x ] Central Line  [ ] PA catheter  [ ] IABP  [ ] ECMO  [ ] LVAD  [ ] Ventilator  [ ] pacemaker [ ] Impella.                      [ ] ABG's     [ ] Pulse Oxymetry Monitoring  Bedside evaluation , monitoring , treatment of hemodynamics , fluids , IVP/ IVCD meds.        Diagnosis:     POD 3 - CABG X 3 L     Chest Tube Drainage     Requires chest PT, pulmonary toilet, suctioning to maintain SaO2,  patent airway and treat atelectasis.     Hypotension - resolved    Hypovolemia     DM 2 - Lantus + Admelog     Requires bedside physical therapy, mobilization and total correction care.     CHF- acute [ x]   chronic [ x]    systolic [ x]   diatolic [ ]          - Echo- EF -       60%     [ x] RV dysfunction          - Cxr-cardiomegally, edema          - Clinical-  [ ]inotropes   [ ]pressors   [ x]diuresis   [ ]IABP   [ ]ECMO   [ ]LVAD   [ ]Respiratory Failure.     Obesity OHS / MICAH     ECG     Thrombocytopenia           By signing my name below, I, Rozina Archibald, attest that this documentation has been prepared under the direction and in the presence of Gaetano Brewer MD.   Electronically Signed: Santhosh Donnelly 21 @ 06:37    IGaetano, personally performed the services described in this documentation. All medical record entries made by the scribe were at my direction and in my presence. I have reviewed the chart and agree that the record reflects my personal performance and is accurate and complete.   Gaetano Brewer MD.       Discussed with CT surgeon, Physician Assistant - Nurse Practitioner- Critical care medicine team.   Discussed at  AM / PM rounds.   Chart, labs , films reviewed.    Cumulative Critical Care Time Given Today:  20 min

## 2021-08-30 NOTE — PROGRESS NOTE ADULT - ASSESSMENT
65 year old M, (denies implanted devices) with significant PMHx of T2DM (last HgbA1c 8.1, managed by PCP, complicated by peripheral neuropathy), HLD, HTN, BPH presented for RHC/LHC. Patient c/o chest discomfort with exertion that has been becoming progressively worse and resolves with rest associated with SOB.  Patient recently went to [facility] ER on 7/29 and patient states results were normal. Patient seen and evaluated by Dr. Celestin and presented for further evaluation and cardiac Cath.  Cath finding revealed: proximal left main 50 %, proximal LAD 95 % stenosis, proximal circumflex: 95 % and proximal RCA: 95 % stenosis. CTS consult called to evaluate patient for cardiac surgery with Dr. Deep Valerio.   8/22 P2Y12 80. Continue with BBlocker, statin asa. CABG tentatively scheduled in am pending repeat p2y12 in am 0400  8/23 CABG postponed P2Y12> 92 this am  Endo consulted elevated BS, Start statin (lopid at home)   ASA betablocker CAD Recheck P2Y12 am  8/24 P2Y12 @ 5pm-->   T&S to be sent at 5pm as well.   8/25 VVS; Hyperglycemia --> BFG > 200.  Transferred to SDU for Insulin gtt.  NPO after midnight  8/26 Off isulin gtt, resumed this  ирина, OR tomorrow.   8/27/21: CABG x 3  8/30/21: Orthostatic in ICU, given volume resuscitation, transferred to floor, VSS, weaning to room air, ambulation, IS. ASA/BB/STATIN

## 2021-08-30 NOTE — PROGRESS NOTE ADULT - ASSESSMENT
Assessment  DMT2: 65y Male with DM T2 with hyperglycemia, A1C 7.1%, was on several oral meds at home, postop transitioned to basal bolus insulin and high-scale coverage, blood sugars in acceptable range this AM and fluctuating postprandially, no hypoglycemias, eating meals, well-appearing.  CAD: on medications, no chest pain, stable, monitored.  HTN: Controlled,  on antihypertensive medications.  Obesity: No strict exercise routines, not on any weight loss program, neither on low calorie diet.        Cb Hill MD  Cell: [phone]  Office: [phone]     Assessment  DMT2: 65y Male with DM T2 with hyperglycemia, A1C 7.1%, was on several oral meds at home, postop transitioned to basal bolus insulin and high-scale coverage,  blood sugars in acceptable range this AM and fluctuating postprandially, no hypoglycemias, eating meals, well-appearing.  CAD: on medications, no chest pain, stable, monitored.  HTN: Controlled,  on antihypertensive medications.  Obesity: No strict exercise routines, not on any weight loss program, neither on low calorie diet.        Cb Hill MD  Cell: [phone]  Office: [phone]

## 2021-08-31 LAB
ANION GAP SERPL CALC-SCNC: 13 MMOL/L — SIGNIFICANT CHANGE UP (ref 5–17)
BASOPHILS # BLD AUTO: 0.07 K/UL — SIGNIFICANT CHANGE UP (ref 0–0.2)
BASOPHILS NFR BLD AUTO: 0.9 % — SIGNIFICANT CHANGE UP (ref 0–2)
BUN SERPL-MCNC: 22 MG/DL — SIGNIFICANT CHANGE UP (ref 7–23)
CALCIUM SERPL-MCNC: 8.3 MG/DL — LOW (ref 8.4–10.5)
CHLORIDE SERPL-SCNC: 103 MMOL/L — SIGNIFICANT CHANGE UP (ref 96–108)
CO2 SERPL-SCNC: 21 MMOL/L — LOW (ref 22–31)
CREAT SERPL-MCNC: 1.07 MG/DL — SIGNIFICANT CHANGE UP (ref 0.5–1.3)
EOSINOPHIL # BLD AUTO: 0 K/UL — SIGNIFICANT CHANGE UP (ref 0–0.5)
EOSINOPHIL NFR BLD AUTO: 0 % — SIGNIFICANT CHANGE UP (ref 0–6)
GLUCOSE BLDC GLUCOMTR-MCNC: 100 MG/DL — HIGH (ref 70–99)
GLUCOSE BLDC GLUCOMTR-MCNC: 111 MG/DL — HIGH (ref 70–99)
GLUCOSE BLDC GLUCOMTR-MCNC: 120 MG/DL — HIGH (ref 70–99)
GLUCOSE BLDC GLUCOMTR-MCNC: 176 MG/DL — HIGH (ref 70–99)
GLUCOSE SERPL-MCNC: 108 MG/DL — HIGH (ref 70–99)
HCT VFR BLD CALC: 23.2 % — LOW (ref 39–50)
HGB BLD-MCNC: 7.4 G/DL — LOW (ref 13–17)
LYMPHOCYTES # BLD AUTO: 0.55 K/UL — LOW (ref 1–3.3)
LYMPHOCYTES # BLD AUTO: 7 % — LOW (ref 13–44)
MANUAL SMEAR VERIFICATION: SIGNIFICANT CHANGE UP
MCHC RBC-ENTMCNC: 30.3 PG — SIGNIFICANT CHANGE UP (ref 27–34)
MCHC RBC-ENTMCNC: 31.9 GM/DL — LOW (ref 32–36)
MCV RBC AUTO: 95.1 FL — SIGNIFICANT CHANGE UP (ref 80–100)
MONOCYTES # BLD AUTO: 0.62 K/UL — SIGNIFICANT CHANGE UP (ref 0–0.9)
MONOCYTES NFR BLD AUTO: 7.9 % — SIGNIFICANT CHANGE UP (ref 2–14)
NEUTROPHILS # BLD AUTO: 6.56 K/UL — SIGNIFICANT CHANGE UP (ref 1.8–7.4)
NEUTROPHILS NFR BLD AUTO: 83.3 % — HIGH (ref 43–77)
NEUTS BAND # BLD: 0.9 % — SIGNIFICANT CHANGE UP (ref 0–8)
NRBC # BLD: 1 /100 — HIGH (ref 0–0)
PLAT MORPH BLD: NORMAL — SIGNIFICANT CHANGE UP
PLATELET # BLD AUTO: 224 K/UL — SIGNIFICANT CHANGE UP (ref 150–400)
POTASSIUM SERPL-MCNC: 3.5 MMOL/L — SIGNIFICANT CHANGE UP (ref 3.5–5.3)
POTASSIUM SERPL-SCNC: 3.5 MMOL/L — SIGNIFICANT CHANGE UP (ref 3.5–5.3)
RBC # BLD: 2.44 M/UL — LOW (ref 4.2–5.8)
RBC # FLD: 14 % — SIGNIFICANT CHANGE UP (ref 10.3–14.5)
RBC BLD AUTO: SIGNIFICANT CHANGE UP
SODIUM SERPL-SCNC: 137 MMOL/L — SIGNIFICANT CHANGE UP (ref 135–145)
WBC # BLD: 7.79 K/UL — SIGNIFICANT CHANGE UP (ref 3.8–10.5)
WBC # FLD AUTO: 7.79 K/UL — SIGNIFICANT CHANGE UP (ref 3.8–10.5)

## 2021-08-31 PROCEDURE — 93010 ELECTROCARDIOGRAM REPORT: CPT

## 2021-08-31 PROCEDURE — 71045 X-RAY EXAM CHEST 1 VIEW: CPT | Mod: 26

## 2021-08-31 RX ORDER — SODIUM CHLORIDE 9 MG/ML
200 INJECTION INTRAMUSCULAR; INTRAVENOUS; SUBCUTANEOUS ONCE
Refills: 0 | Status: COMPLETED | OUTPATIENT
Start: 2021-08-31 | End: 2021-08-31

## 2021-08-31 RX ORDER — METOPROLOL TARTRATE 50 MG
25 TABLET ORAL
Refills: 0 | Status: DISCONTINUED | OUTPATIENT
Start: 2021-08-31 | End: 2021-09-06

## 2021-08-31 RX ORDER — AMIODARONE HYDROCHLORIDE 400 MG/1
400 TABLET ORAL
Refills: 0 | Status: COMPLETED | OUTPATIENT
Start: 2021-08-31 | End: 2021-09-06

## 2021-08-31 RX ORDER — AMIODARONE HYDROCHLORIDE 400 MG/1
200 TABLET ORAL DAILY
Refills: 0 | Status: DISCONTINUED | OUTPATIENT
Start: 2021-09-05 | End: 2021-09-06

## 2021-08-31 RX ORDER — AMIODARONE HYDROCHLORIDE 400 MG/1
400 TABLET ORAL
Refills: 0 | Status: DISCONTINUED | OUTPATIENT
Start: 2021-08-31 | End: 2021-08-31

## 2021-08-31 RX ORDER — METOPROLOL TARTRATE 50 MG
12.5 TABLET ORAL ONCE
Refills: 0 | Status: COMPLETED | OUTPATIENT
Start: 2021-08-31 | End: 2021-08-31

## 2021-08-31 RX ORDER — FUROSEMIDE 40 MG
20 TABLET ORAL ONCE
Refills: 0 | Status: COMPLETED | OUTPATIENT
Start: 2021-08-31 | End: 2021-08-31

## 2021-08-31 RX ORDER — POTASSIUM CHLORIDE 20 MEQ
20 PACKET (EA) ORAL ONCE
Refills: 0 | Status: COMPLETED | OUTPATIENT
Start: 2021-08-31 | End: 2021-08-31

## 2021-08-31 RX ORDER — INSULIN LISPRO 100/ML
13 VIAL (ML) SUBCUTANEOUS
Refills: 0 | Status: DISCONTINUED | OUTPATIENT
Start: 2021-08-31 | End: 2021-09-01

## 2021-08-31 RX ADMIN — Medication 12.5 MILLIGRAM(S): at 06:36

## 2021-08-31 RX ADMIN — GABAPENTIN 300 MILLIGRAM(S): 400 CAPSULE ORAL at 06:36

## 2021-08-31 RX ADMIN — GABAPENTIN 300 MILLIGRAM(S): 400 CAPSULE ORAL at 14:44

## 2021-08-31 RX ADMIN — Medication 20 MILLIGRAM(S): at 11:53

## 2021-08-31 RX ADMIN — Medication 25 MILLIGRAM(S): at 17:53

## 2021-08-31 RX ADMIN — SODIUM CHLORIDE 400 MILLILITER(S): 9 INJECTION INTRAMUSCULAR; INTRAVENOUS; SUBCUTANEOUS at 09:20

## 2021-08-31 RX ADMIN — OXYCODONE HYDROCHLORIDE 5 MILLIGRAM(S): 5 TABLET ORAL at 23:59

## 2021-08-31 RX ADMIN — Medication 1: at 11:53

## 2021-08-31 RX ADMIN — PANTOPRAZOLE SODIUM 40 MILLIGRAM(S): 20 TABLET, DELAYED RELEASE ORAL at 06:36

## 2021-08-31 RX ADMIN — OXYCODONE HYDROCHLORIDE 5 MILLIGRAM(S): 5 TABLET ORAL at 18:00

## 2021-08-31 RX ADMIN — Medication 600 MILLIGRAM(S): at 09:19

## 2021-08-31 RX ADMIN — Medication 12 UNIT(S): at 11:54

## 2021-08-31 RX ADMIN — Medication 325 MILLIGRAM(S): at 11:55

## 2021-08-31 RX ADMIN — ATORVASTATIN CALCIUM 80 MILLIGRAM(S): 80 TABLET, FILM COATED ORAL at 22:07

## 2021-08-31 RX ADMIN — OXYCODONE HYDROCHLORIDE 5 MILLIGRAM(S): 5 TABLET ORAL at 17:56

## 2021-08-31 RX ADMIN — TAMSULOSIN HYDROCHLORIDE 0.4 MILLIGRAM(S): 0.4 CAPSULE ORAL at 22:07

## 2021-08-31 RX ADMIN — Medication 12.5 MILLIGRAM(S): at 09:20

## 2021-08-31 RX ADMIN — FINASTERIDE 5 MILLIGRAM(S): 5 TABLET, FILM COATED ORAL at 11:57

## 2021-08-31 RX ADMIN — Medication 20 MILLIEQUIVALENT(S): at 11:53

## 2021-08-31 RX ADMIN — OXYCODONE HYDROCHLORIDE 5 MILLIGRAM(S): 5 TABLET ORAL at 07:05

## 2021-08-31 RX ADMIN — Medication 13 UNIT(S): at 17:44

## 2021-08-31 RX ADMIN — AMIODARONE HYDROCHLORIDE 400 MILLIGRAM(S): 400 TABLET ORAL at 17:53

## 2021-08-31 RX ADMIN — OXYCODONE HYDROCHLORIDE 5 MILLIGRAM(S): 5 TABLET ORAL at 06:35

## 2021-08-31 RX ADMIN — Medication 600 MILLIGRAM(S): at 17:53

## 2021-08-31 RX ADMIN — INSULIN GLARGINE 24 UNIT(S): 100 INJECTION, SOLUTION SUBCUTANEOUS at 22:08

## 2021-08-31 RX ADMIN — ENOXAPARIN SODIUM 40 MILLIGRAM(S): 100 INJECTION SUBCUTANEOUS at 11:57

## 2021-08-31 RX ADMIN — POLYETHYLENE GLYCOL 3350 17 GRAM(S): 17 POWDER, FOR SOLUTION ORAL at 11:56

## 2021-08-31 RX ADMIN — SENNA PLUS 2 TABLET(S): 8.6 TABLET ORAL at 22:06

## 2021-08-31 RX ADMIN — GABAPENTIN 300 MILLIGRAM(S): 400 CAPSULE ORAL at 22:07

## 2021-08-31 RX ADMIN — Medication 12 UNIT(S): at 09:20

## 2021-08-31 NOTE — PROGRESS NOTE ADULT - ASSESSMENT
65 year old M, (denies implanted devices) with significant PMHx of T2DM (last HgbA1c 8.1, managed by PCP, complicated by peripheral neuropathy), HLD, HTN, BPH presented for RHC/LHC. Patient c/o chest discomfort with exertion that has been becoming progressively worse and resolves with rest associated with SOB.  Patient recently went to [facility] ER on 7/29 and patient states results were normal. Patient seen and evaluated by Dr. Celestin and presented for further evaluation and cardiac Cath.  Cath finding revealed: proximal left main 50 %, proximal LAD 95 % stenosis, proximal circumflex: 95 % and proximal RCA: 95 % stenosis. CTS consult called to evaluate patient for cardiac surgery with Dr. Deep Valerio.   8/22 P2Y12 80. Continue with BBlocker, statin asa. CABG tentatively scheduled in am pending repeat p2y12 in am 0400  8/23 CABG postponed P2Y12> 92 this am  Endo consulted elevated BS, Start statin (lopid at home)   ASA betablocker CAD Recheck P2Y12 am  8/24 P2Y12 @ 5pm-->   T&S to be sent at 5pm as well.   8/25 VVS; Hyperglycemia --> BFG > 200.  Transferred to SDU for Insulin gtt.  NPO after midnight  8/26 Off isulin gtt, resumed this  ирина, OR tomorrow.   8/27/21: CABG x 3  8/30/21: Orthostatic in ICU, given volume resuscitation, transferred to floor, VSS, weaning to room air, ambulation, IS. ASA/BB/STATIN    8/31   asymptomatci  BP   88/60,   brief   rate controlled afib  increased  lop to 25 q12,  lasix 20 x 1 today for edema

## 2021-08-31 NOTE — PROGRESS NOTE ADULT - PROBLEM SELECTOR PLAN 1
s/p CABG x 3, POD3, EF NL   lasix 20 x 1  -continue asa 325mg daily, atorvastatin 80mg qhs, metoprolol   25 mg BID   brief afib this am  -volume resuscitated  200 cc this am  -wean to room air.    hct 23   follow HCT

## 2021-08-31 NOTE — DIETITIAN INITIAL EVALUATION ADULT. - PERTINENT LABORATORY DATA
08-31 Na137 mmol/L Glu 108 mg/dL<H> K+ 3.5 mmol/L Cr  1.07 mg/dL BUN 22 mg/dL   A1C with Estimated Average Glucose Result: 7.1 % (08-21-21 @ 09:24)  A1C with Estimated Average Glucose Result: 7.2 % (08-20-21 @ 17:00)  CAPILLARY BLOOD GLUCOSE  POCT Blood Glucose.: 176 mg/dL (31 Aug 2021 11:39)  POCT Blood Glucose.: 120 mg/dL (31 Aug 2021 07:29)  POCT Blood Glucose.: 139 mg/dL (30 Aug 2021 21:14)  POCT Blood Glucose.: 140 mg/dL (30 Aug 2021 16:25)  POCT Blood Glucose.: 205 mg/dL (30 Aug 2021 12:00)

## 2021-08-31 NOTE — DIETITIAN INITIAL EVALUATION ADULT. - PHYSCIAL ASSESSMENT
IBW%: 134%   Skin per nursing documentation: No pressure injuries noted. Midsternal incision overweight

## 2021-08-31 NOTE — DIETITIAN INITIAL EVALUATION ADULT. - PERTINENT MEDS FT
MEDICATIONS  (STANDING):  aspirin enteric coated 325 milliGRAM(s) Oral daily  atorvastatin 80 milliGRAM(s) Oral at bedtime  dextrose 40% Gel 15 Gram(s) Oral once  dextrose 50% Injectable 50 milliLiter(s) IV Push every 15 minutes  dextrose 50% Injectable 25 milliLiter(s) IV Push every 15 minutes  enoxaparin Injectable 40 milliGRAM(s) SubCutaneous daily  finasteride 5 milliGRAM(s) Oral daily  furosemide    Tablet 20 milliGRAM(s) Oral once  gabapentin 300 milliGRAM(s) Oral three times a day  gemfibrozil 600 milliGRAM(s) Oral two times a day  glucagon  Injectable 1 milliGRAM(s) IntraMuscular once  insulin glargine Injectable (LANTUS) 24 Unit(s) SubCutaneous at bedtime  insulin lispro (ADMELOG) corrective regimen sliding scale   SubCutaneous three times a day before meals  insulin lispro (ADMELOG) corrective regimen sliding scale   SubCutaneous at bedtime  insulin lispro Injectable (ADMELOG) 12 Unit(s) SubCutaneous three times a day before meals  metoprolol tartrate 25 milliGRAM(s) Oral two times a day  pantoprazole    Tablet 40 milliGRAM(s) Oral before breakfast  polyethylene glycol 3350 17 Gram(s) Oral daily  potassium chloride    Tablet ER 20 milliEquivalent(s) Oral once  senna 2 Tablet(s) Oral at bedtime  tamsulosin 0.4 milliGRAM(s) Oral at bedtime

## 2021-08-31 NOTE — PROGRESS NOTE ADULT - PROBLEM SELECTOR PLAN 1
Will continue Lantus 24 units at bed time.  Will increase Admelog to 13 units before each meal and continue Admelog correction scale coverage. Will continue monitoring FS and FU.  Patient has moderate insulin requirement and would benefit from insulin as outpatient. Counseled in great detail on importance of tight glycemic control, he is reluctant though agreeable. Suggest to start insulin administration teaching. Will continue monitoring and FU DC recommendations.  Patient counseled for compliance with consistent low carb diet and exercise as tolerated outpatient.

## 2021-08-31 NOTE — PROGRESS NOTE ADULT - SUBJECTIVE AND OBJECTIVE BOX
Chief complaint  Patient is a 65y old  Male who presents with a chief complaint of sp    CABG (31 Aug 2021 11:46)   Review of systems  Patient awake in chair, looks comfortable, no hypoglycemic episodes.    Labs and Fingersticks  CAPILLARY BLOOD GLUCOSE      POCT Blood Glucose.: 176 mg/dL (31 Aug 2021 11:39)  POCT Blood Glucose.: 120 mg/dL (31 Aug 2021 07:29)  POCT Blood Glucose.: 139 mg/dL (30 Aug 2021 21:14)  POCT Blood Glucose.: 140 mg/dL (30 Aug 2021 16:25)      Anion Gap, Serum: 13 (08-31 @ 07:23)  Anion Gap, Serum: 9 (08-30 @ 00:29)      Calcium, Total Serum: 8.3 *L* (08-31 @ 07:23)  Calcium, Total Serum: 8.4 (08-30 @ 00:29)  Albumin, Serum: 3.2 *L* (08-30 @ 00:29)    Alanine Aminotransferase (ALT/SGPT): 17 (08-30 @ 00:29)  Alkaline Phosphatase, Serum: 47 (08-30 @ 00:29)  Aspartate Aminotransferase (AST/SGOT): 22 (08-30 @ 00:29)        08-31    137  |  103  |  22  ----------------------------<  108<H>  3.5   |  21<L>  |  1.07    Ca    8.3<L>      31 Aug 2021 07:23  Phos  1.5     08-30  Mg     2.3     08-30    TPro  5.5<L>  /  Alb  3.2<L>  /  TBili  0.5  /  DBili  x   /  AST  22  /  ALT  17  /  AlkPhos  47  08-30                        7.4    7.79  )-----------( 224      ( 31 Aug 2021 07:26 )             23.2     Medications  MEDICATIONS  (STANDING):  aspirin enteric coated 325 milliGRAM(s) Oral daily  atorvastatin 80 milliGRAM(s) Oral at bedtime  dextrose 40% Gel 15 Gram(s) Oral once  dextrose 50% Injectable 50 milliLiter(s) IV Push every 15 minutes  dextrose 50% Injectable 25 milliLiter(s) IV Push every 15 minutes  enoxaparin Injectable 40 milliGRAM(s) SubCutaneous daily  finasteride 5 milliGRAM(s) Oral daily  gabapentin 300 milliGRAM(s) Oral three times a day  gemfibrozil 600 milliGRAM(s) Oral two times a day  glucagon  Injectable 1 milliGRAM(s) IntraMuscular once  insulin glargine Injectable (LANTUS) 24 Unit(s) SubCutaneous at bedtime  insulin lispro (ADMELOG) corrective regimen sliding scale   SubCutaneous three times a day before meals  insulin lispro (ADMELOG) corrective regimen sliding scale   SubCutaneous at bedtime  insulin lispro Injectable (ADMELOG) 13 Unit(s) SubCutaneous three times a day before meals  metoprolol tartrate 25 milliGRAM(s) Oral two times a day  pantoprazole    Tablet 40 milliGRAM(s) Oral before breakfast  polyethylene glycol 3350 17 Gram(s) Oral daily  senna 2 Tablet(s) Oral at bedtime  tamsulosin 0.4 milliGRAM(s) Oral at bedtime      Physical Exam  General: Patient comfortable in bed  Vital Signs Last 12 Hrs  T(F): 98.6 (08-31-21 @ 05:02), Max: 98.6 (08-31-21 @ 05:02)  HR: 104 (08-31-21 @ 05:02) (104 - 104)  BP: 99/61 (08-31-21 @ 09:30) (88/56 - 106/64)  BP(mean): --  RR: 18 (08-31-21 @ 05:02) (18 - 18)  SpO2: 92% (08-31-21 @ 05:02) (92% - 92%)  Neck: No palpable thyroid nodules.  CVS: S1S2, No murmurs  Respiratory: No wheezing, no crepitations  GI: Abdomen soft, bowel sounds positive  Musculoskeletal:  edema lower extremities.   Skin: No skin rashes, no ecchymosis    Diagnostics             Chief complaint  Patient is a 65y old  Male who presents with a chief complaint of sp    CABG (31 Aug 2021 11:46)   Review of systems  Patient awake in chair, looks comfortable, no hypoglycemic episodes.    Labs and Fingersticks  CAPILLARY BLOOD GLUCOSE      POCT Blood Glucose.: 176 mg/dL (31 Aug 2021 11:39)  POCT Blood Glucose.: 120 mg/dL (31 Aug 2021 07:29)  POCT Blood Glucose.: 139 mg/dL (30 Aug 2021 21:14)  POCT Blood Glucose.: 140 mg/dL (30 Aug 2021 16:25)      Anion Gap, Serum: 13 (08-31 @ 07:23)  Anion Gap, Serum: 9 (08-30 @ 00:29)      Calcium, Total Serum: 8.3 *L* (08-31 @ 07:23)  Calcium, Total Serum: 8.4 (08-30 @ 00:29)  Albumin, Serum: 3.2 *L* (08-30 @ 00:29)    Alanine Aminotransferase (ALT/SGPT): 17 (08-30 @ 00:29)  Alkaline Phosphatase, Serum: 47 (08-30 @ 00:29)  Aspartate Aminotransferase (AST/SGOT): 22 (08-30 @ 00:29)        08-31    137  |  103  |  22  ----------------------------<  108<H>  3.5   |  21<L>  |  1.07    Ca    8.3<L>      31 Aug 2021 07:23  Phos  1.5     08-30  Mg     2.3     08-30    TPro  5.5<L>  /  Alb  3.2<L>  /  TBili  0.5  /  DBili  x   /  AST  22  /  ALT  17  /  AlkPhos  47  08-30                        7.4    7.79  )-----------( 224      ( 31 Aug 2021 07:26 )             23.2     Medications  MEDICATIONS  (STANDING):  aspirin enteric coated 325 milliGRAM(s) Oral daily  atorvastatin 80 milliGRAM(s) Oral at bedtime  dextrose 40% Gel 15 Gram(s) Oral once  dextrose 50% Injectable 50 milliLiter(s) IV Push every 15 minutes  dextrose 50% Injectable 25 milliLiter(s) IV Push every 15 minutes  enoxaparin Injectable 40 milliGRAM(s) SubCutaneous daily  finasteride 5 milliGRAM(s) Oral daily  gabapentin 300 milliGRAM(s) Oral three times a day  gemfibrozil 600 milliGRAM(s) Oral two times a day  glucagon  Injectable 1 milliGRAM(s) IntraMuscular once  insulin glargine Injectable (LANTUS) 24 Unit(s) SubCutaneous at bedtime  insulin lispro (ADMELOG) corrective regimen sliding scale   SubCutaneous three times a day before meals  insulin lispro (ADMELOG) corrective regimen sliding scale   SubCutaneous at bedtime  insulin lispro Injectable (ADMELOG) 13 Unit(s) SubCutaneous three times a day before meals  metoprolol tartrate 25 milliGRAM(s) Oral two times a day  pantoprazole    Tablet 40 milliGRAM(s) Oral before breakfast  polyethylene glycol 3350 17 Gram(s) Oral daily  senna 2 Tablet(s) Oral at bedtime  tamsulosin 0.4 milliGRAM(s) Oral at bedtime      Physical Exam  General: Patient comfortable in bed  Vital Signs Last 12 Hrs  T(F): 98.6 (08-31-21 @ 05:02), Max: 98.6 (08-31-21 @ 05:02)  HR: 104 (08-31-21 @ 05:02) (104 - 104)  BP: 99/61 (08-31-21 @ 09:30) (88/56 - 106/64)  BP(mean): --  RR: 18 (08-31-21 @ 05:02) (18 - 18)  SpO2: 92% (08-31-21 @ 05:02) (92% - 92%)  Neck: No palpable thyroid nodules.  CVS: S1S2, No murmurs  Respiratory: No wheezing, no crepitations  GI: Abdomen soft, bowel sounds positive  Musculoskeletal:  edema lower extremities.   Skin: No skin rashes, no ecchymosis    Diagnostics

## 2021-08-31 NOTE — DIETITIAN INITIAL EVALUATION ADULT. - ORAL INTAKE PTA/DIET HISTORY
Pt was eating well with no changes in appetite. Pt was following a consistent carbohydrate diet. Confirms no known food allergies. Denies Hx of chewing or swallowing issues. Denies oral nutrient supplement use. Took Vitamin D and multivitamin.

## 2021-08-31 NOTE — PROGRESS NOTE ADULT - SUBJECTIVE AND OBJECTIVE BOX
VITAL SIGNS    Telemetry:    NSR   afib  to NSR    Vital Signs Last 24 Hrs  T(C): 37 (21 @ 05:02), Max: 37.6 (21 @ 10:30)  T(F): 98.6 (21 @ 05:02), Max: 99.7 (21 @ 10:30)  HR: 104 (21 @ 05:02) (62 - 104)  BP: 88/56 (21 @ 08:50) (88/56 - 122/65)  RR: 18 (21 @ 05:02) (16 - 18)  SpO2: 92% (21 @ 05:02) (92% - 100%)                   Daily     Daily Weight in k.9 (31 Aug 2021 08:31)        CAPILLARY BLOOD GLUCOSE      POCT Blood Glucose.: 120 mg/dL (31 Aug 2021 07:29)  POCT Blood Glucose.: 139 mg/dL (30 Aug 2021 21:14)  POCT Blood Glucose.: 140 mg/dL (30 Aug 2021 16:25)  POCT Blood Glucose.: 205 mg/dL (30 Aug 2021 12:00)                      PHYSICAL EXAM    Neurology: alert and oriented x 3, moves all extremities with no defecits  CV :  RRR  Sternal Wound :  CDI , Stable  Lungs:   CTA B/L  Abdomen:  nontender,distended, positive bowel sounds, last bowel movement   preop  Extremities:     plus one to 2 ankle edema

## 2021-08-31 NOTE — DIETITIAN INITIAL EVALUATION ADULT. - CHIEF COMPLAINT
Per Chart: Pt is a 65 year old M, (denies implanted devices) with significant PMHx of T2DM (last HgbA1c 8.1, managed by PCP, complicated by peripheral neuropathy), HLD, HTN, BPH presented for RHC/LHC. 8/27/21: CABG x 3

## 2021-08-31 NOTE — DIETITIAN INITIAL EVALUATION ADULT. - ORAL NUTRITION SUPPLEMENTS
2) Glucerna x1 daily 5000u UFH BID should be considered with PPH evaluation (if two or more selected above)

## 2021-08-31 NOTE — PROGRESS NOTE ADULT - ASSESSMENT
Assessment  DMT2: 65y Male with DM T2 with hyperglycemia, A1C 7.1%, was on several oral meds at home, postop on basal bolus insulin, increased dose yesterday, blood sugars are improving, no hypoglycemic episodes, eating meals, appears alert and comfortable.  CAD: on medications, no chest pain, stable, monitored.  HTN: Controlled,  on antihypertensive medications.  Obesity: No strict exercise routines, not on any weight loss program, neither on low calorie diet.        Cb Hill MD  Cell: [phone]  Office: [phone]     Assessment  DMT2: 65y Male with DM T2 with hyperglycemia, A1C 7.1%, was on several oral meds at home, postop on basal bolus insulin, increased dose yesterday, blood sugars are improving, no hypoglycemic episodes,  eating meals, appears alert and comfortable.  CAD: on medications, no chest pain, stable, monitored.  HTN: Controlled,  on antihypertensive medications.  Obesity: No strict exercise routines, not on any weight loss program, neither on low calorie diet.        Cb Hill MD  Cell: [phone]  Office: [phone]

## 2021-08-31 NOTE — DIETITIAN INITIAL EVALUATION ADULT. - OTHER INFO
Pt with Hx of Type 2 diabetes; manages with metformin, Jardiance, Glipizide, and Jardiance . Checks blood sugars x1 daily; normal range 140 mg/dL. Recent A1C 7.1% (8/21), indicating fair glycemic control.    Dosing wt: 190 lbs. Daily wt in lbs: 220.2 (8/31), 185.8 (8/21). Reports UBW of ~185 lbs, denies any changes in wt PTA. Wt fluctuations in-house likely 2/2 intraoperative fluid shifts. RD will continue to trend as new wts available/able.     Pt reports fair intake with intact appetite; dislikes institutional foods. Per RN flowsheet intake typically 100% meals. Denies recent N/V, or diarrhea. Pt with constipation. Last BM 8/26 - on bowel regimen.    RD reviewed nutrition therapy for s/p CABG with midsternal incision. Emphasized importance of adequate lean protein intake and optimal blood glucose levels for wound healing. Advised pt to limit concentrated sweets, portion control, and importance of fiber intake. Amendable to trialing Glucerna. Pt made aware RD to remain available.

## 2021-09-01 DIAGNOSIS — Z95.1 PRESENCE OF AORTOCORONARY BYPASS GRAFT: ICD-10-CM

## 2021-09-01 LAB
ANION GAP SERPL CALC-SCNC: 11 MMOL/L — SIGNIFICANT CHANGE UP (ref 5–17)
BLD GP AB SCN SERPL QL: NEGATIVE — SIGNIFICANT CHANGE UP
BUN SERPL-MCNC: 24 MG/DL — HIGH (ref 7–23)
CALCIUM SERPL-MCNC: 8.6 MG/DL — SIGNIFICANT CHANGE UP (ref 8.4–10.5)
CHLORIDE SERPL-SCNC: 103 MMOL/L — SIGNIFICANT CHANGE UP (ref 96–108)
CO2 SERPL-SCNC: 23 MMOL/L — SIGNIFICANT CHANGE UP (ref 22–31)
CREAT SERPL-MCNC: 0.99 MG/DL — SIGNIFICANT CHANGE UP (ref 0.5–1.3)
GLUCOSE BLDC GLUCOMTR-MCNC: 103 MG/DL — HIGH (ref 70–99)
GLUCOSE BLDC GLUCOMTR-MCNC: 215 MG/DL — HIGH (ref 70–99)
GLUCOSE BLDC GLUCOMTR-MCNC: 226 MG/DL — HIGH (ref 70–99)
GLUCOSE BLDC GLUCOMTR-MCNC: 75 MG/DL — SIGNIFICANT CHANGE UP (ref 70–99)
GLUCOSE BLDC GLUCOMTR-MCNC: 91 MG/DL — SIGNIFICANT CHANGE UP (ref 70–99)
GLUCOSE BLDC GLUCOMTR-MCNC: 95 MG/DL — SIGNIFICANT CHANGE UP (ref 70–99)
GLUCOSE SERPL-MCNC: 82 MG/DL — SIGNIFICANT CHANGE UP (ref 70–99)
HCT VFR BLD CALC: 25.5 % — LOW (ref 39–50)
HGB BLD-MCNC: 8 G/DL — LOW (ref 13–17)
MCHC RBC-ENTMCNC: 29.9 PG — SIGNIFICANT CHANGE UP (ref 27–34)
MCHC RBC-ENTMCNC: 31.4 GM/DL — LOW (ref 32–36)
MCV RBC AUTO: 95.1 FL — SIGNIFICANT CHANGE UP (ref 80–100)
NRBC # BLD: 0 /100 WBCS — SIGNIFICANT CHANGE UP (ref 0–0)
PLATELET # BLD AUTO: 299 K/UL — SIGNIFICANT CHANGE UP (ref 150–400)
POTASSIUM SERPL-MCNC: 3.8 MMOL/L — SIGNIFICANT CHANGE UP (ref 3.5–5.3)
POTASSIUM SERPL-SCNC: 3.8 MMOL/L — SIGNIFICANT CHANGE UP (ref 3.5–5.3)
RBC # BLD: 2.68 M/UL — LOW (ref 4.2–5.8)
RBC # FLD: 14.2 % — SIGNIFICANT CHANGE UP (ref 10.3–14.5)
RH IG SCN BLD-IMP: POSITIVE — SIGNIFICANT CHANGE UP
SODIUM SERPL-SCNC: 137 MMOL/L — SIGNIFICANT CHANGE UP (ref 135–145)
WBC # BLD: 7.12 K/UL — SIGNIFICANT CHANGE UP (ref 3.8–10.5)
WBC # FLD AUTO: 7.12 K/UL — SIGNIFICANT CHANGE UP (ref 3.8–10.5)

## 2021-09-01 RX ORDER — POTASSIUM CHLORIDE 20 MEQ
20 PACKET (EA) ORAL ONCE
Refills: 0 | Status: COMPLETED | OUTPATIENT
Start: 2021-09-01 | End: 2021-09-01

## 2021-09-01 RX ORDER — INSULIN GLARGINE 100 [IU]/ML
20 INJECTION, SOLUTION SUBCUTANEOUS AT BEDTIME
Refills: 0 | Status: DISCONTINUED | OUTPATIENT
Start: 2021-09-01 | End: 2021-09-03

## 2021-09-01 RX ORDER — INSULIN LISPRO 100/ML
10 VIAL (ML) SUBCUTANEOUS
Refills: 0 | Status: DISCONTINUED | OUTPATIENT
Start: 2021-09-01 | End: 2021-09-02

## 2021-09-01 RX ADMIN — Medication 2: at 11:47

## 2021-09-01 RX ADMIN — INSULIN GLARGINE 20 UNIT(S): 100 INJECTION, SOLUTION SUBCUTANEOUS at 22:36

## 2021-09-01 RX ADMIN — Medication 10 UNIT(S): at 11:47

## 2021-09-01 RX ADMIN — Medication 25 MILLIGRAM(S): at 05:42

## 2021-09-01 RX ADMIN — POLYETHYLENE GLYCOL 3350 17 GRAM(S): 17 POWDER, FOR SOLUTION ORAL at 11:50

## 2021-09-01 RX ADMIN — GABAPENTIN 300 MILLIGRAM(S): 400 CAPSULE ORAL at 05:39

## 2021-09-01 RX ADMIN — SENNA PLUS 2 TABLET(S): 8.6 TABLET ORAL at 21:46

## 2021-09-01 RX ADMIN — GABAPENTIN 300 MILLIGRAM(S): 400 CAPSULE ORAL at 13:05

## 2021-09-01 RX ADMIN — GABAPENTIN 300 MILLIGRAM(S): 400 CAPSULE ORAL at 21:46

## 2021-09-01 RX ADMIN — Medication 600 MILLIGRAM(S): at 05:39

## 2021-09-01 RX ADMIN — OXYCODONE HYDROCHLORIDE 5 MILLIGRAM(S): 5 TABLET ORAL at 20:30

## 2021-09-01 RX ADMIN — PANTOPRAZOLE SODIUM 40 MILLIGRAM(S): 20 TABLET, DELAYED RELEASE ORAL at 05:40

## 2021-09-01 RX ADMIN — TAMSULOSIN HYDROCHLORIDE 0.4 MILLIGRAM(S): 0.4 CAPSULE ORAL at 21:46

## 2021-09-01 RX ADMIN — FINASTERIDE 5 MILLIGRAM(S): 5 TABLET, FILM COATED ORAL at 11:50

## 2021-09-01 RX ADMIN — ENOXAPARIN SODIUM 40 MILLIGRAM(S): 100 INJECTION SUBCUTANEOUS at 11:49

## 2021-09-01 RX ADMIN — OXYCODONE HYDROCHLORIDE 5 MILLIGRAM(S): 5 TABLET ORAL at 11:54

## 2021-09-01 RX ADMIN — Medication 10 UNIT(S): at 17:07

## 2021-09-01 RX ADMIN — Medication 325 MILLIGRAM(S): at 11:49

## 2021-09-01 RX ADMIN — AMIODARONE HYDROCHLORIDE 400 MILLIGRAM(S): 400 TABLET ORAL at 05:40

## 2021-09-01 RX ADMIN — ATORVASTATIN CALCIUM 80 MILLIGRAM(S): 80 TABLET, FILM COATED ORAL at 21:46

## 2021-09-01 RX ADMIN — Medication 25 MILLIGRAM(S): at 17:08

## 2021-09-01 RX ADMIN — Medication 10 MILLIGRAM(S): at 05:39

## 2021-09-01 RX ADMIN — OXYCODONE HYDROCHLORIDE 5 MILLIGRAM(S): 5 TABLET ORAL at 12:25

## 2021-09-01 RX ADMIN — Medication 600 MILLIGRAM(S): at 17:08

## 2021-09-01 RX ADMIN — Medication 2: at 17:06

## 2021-09-01 RX ADMIN — OXYCODONE HYDROCHLORIDE 5 MILLIGRAM(S): 5 TABLET ORAL at 00:29

## 2021-09-01 RX ADMIN — OXYCODONE HYDROCHLORIDE 5 MILLIGRAM(S): 5 TABLET ORAL at 19:59

## 2021-09-01 RX ADMIN — AMIODARONE HYDROCHLORIDE 400 MILLIGRAM(S): 400 TABLET ORAL at 17:09

## 2021-09-01 RX ADMIN — Medication 20 MILLIEQUIVALENT(S): at 11:47

## 2021-09-01 NOTE — PROGRESS NOTE ADULT - SUBJECTIVE AND OBJECTIVE BOX
Chief complaint  Patient is a 65y old  Male who presents with a chief complaint of sp    CABG (31 Aug 2021 11:46)   Review of systems  Patient in bed, looks comfortable, no hypoglycemic episodes.    Labs and Fingersticks  CAPILLARY BLOOD GLUCOSE      POCT Blood Glucose.: 226 mg/dL (01 Sep 2021 11:34)  POCT Blood Glucose.: 91 mg/dL (01 Sep 2021 07:44)  POCT Blood Glucose.: 95 mg/dL (01 Sep 2021 04:16)  POCT Blood Glucose.: 75 mg/dL (01 Sep 2021 03:46)  POCT Blood Glucose.: 111 mg/dL (31 Aug 2021 21:31)  POCT Blood Glucose.: 100 mg/dL (31 Aug 2021 16:55)      Anion Gap, Serum: 11 (09-01 @ 06:58)  Anion Gap, Serum: 13 (08-31 @ 07:23)      Calcium, Total Serum: 8.6 (09-01 @ 06:58)  Calcium, Total Serum: 8.3 *L* (08-31 @ 07:23)          09-01    137  |  103  |  24<H>  ----------------------------<  82  3.8   |  23  |  0.99    Ca    8.6      01 Sep 2021 06:58                          8.0    7.12  )-----------( 299      ( 01 Sep 2021 06:58 )             25.5     Medications  MEDICATIONS  (STANDING):  aMIOdarone    Tablet 400 milliGRAM(s) Oral two times a day  aspirin enteric coated 325 milliGRAM(s) Oral daily  atorvastatin 80 milliGRAM(s) Oral at bedtime  dextrose 40% Gel 15 Gram(s) Oral once  dextrose 50% Injectable 50 milliLiter(s) IV Push every 15 minutes  dextrose 50% Injectable 25 milliLiter(s) IV Push every 15 minutes  enoxaparin Injectable 40 milliGRAM(s) SubCutaneous daily  finasteride 5 milliGRAM(s) Oral daily  gabapentin 300 milliGRAM(s) Oral three times a day  gemfibrozil 600 milliGRAM(s) Oral two times a day  glucagon  Injectable 1 milliGRAM(s) IntraMuscular once  insulin glargine Injectable (LANTUS) 20 Unit(s) SubCutaneous at bedtime  insulin lispro (ADMELOG) corrective regimen sliding scale   SubCutaneous three times a day before meals  insulin lispro (ADMELOG) corrective regimen sliding scale   SubCutaneous at bedtime  insulin lispro Injectable (ADMELOG) 10 Unit(s) SubCutaneous three times a day before meals  metoprolol tartrate 25 milliGRAM(s) Oral two times a day  pantoprazole    Tablet 40 milliGRAM(s) Oral before breakfast  polyethylene glycol 3350 17 Gram(s) Oral daily  senna 2 Tablet(s) Oral at bedtime  tamsulosin 0.4 milliGRAM(s) Oral at bedtime      Physical Exam  General: Patient comfortable in bed  Vital Signs Last 12 Hrs  T(F): 98.4 (09-01-21 @ 11:07), Max: 98.4 (09-01-21 @ 11:07)  HR: 69 (09-01-21 @ 11:07) (69 - 73)  BP: 104/69 (09-01-21 @ 11:07) (104/69 - 119/69)  BP(mean): --  RR: 18 (09-01-21 @ 11:07) (18 - 18)  SpO2: 95% (09-01-21 @ 11:07) (95% - 95%)  Neck: No palpable thyroid nodules.  CVS: S1S2, No murmurs  Respiratory: No wheezing, no crepitations  GI: Abdomen soft, bowel sounds positive  Musculoskeletal:  edema lower extremities.   Skin: No skin rashes, no ecchymosis    Diagnostics             Chief complaint  Patient is a 65y old  Male who presents with a chief complaint of sp    CABG (31 Aug 2021 11:46)   Review of systems  Patient in bed, looks comfortable, no hypoglycemic episodes.    Labs and Fingersticks  CAPILLARY BLOOD GLUCOSE      POCT Blood Glucose.: 226 mg/dL (01 Sep 2021 11:34)  POCT Blood Glucose.: 91 mg/dL (01 Sep 2021 07:44)  POCT Blood Glucose.: 95 mg/dL (01 Sep 2021 04:16)  POCT Blood Glucose.: 75 mg/dL (01 Sep 2021 03:46)  POCT Blood Glucose.: 111 mg/dL (31 Aug 2021 21:31)  POCT Blood Glucose.: 100 mg/dL (31 Aug 2021 16:55)      Anion Gap, Serum: 11 (09-01 @ 06:58)  Anion Gap, Serum: 13 (08-31 @ 07:23)      Calcium, Total Serum: 8.6 (09-01 @ 06:58)  Calcium, Total Serum: 8.3 *L* (08-31 @ 07:23)          09-01    137  |  103  |  24<H>  ----------------------------<  82  3.8   |  23  |  0.99    Ca    8.6      01 Sep 2021 06:58                          8.0    7.12  )-----------( 299      ( 01 Sep 2021 06:58 )             25.5     Medications  MEDICATIONS  (STANDING):  aMIOdarone    Tablet 400 milliGRAM(s) Oral two times a day  aspirin enteric coated 325 milliGRAM(s) Oral daily  atorvastatin 80 milliGRAM(s) Oral at bedtime  dextrose 40% Gel 15 Gram(s) Oral once  dextrose 50% Injectable 50 milliLiter(s) IV Push every 15 minutes  dextrose 50% Injectable 25 milliLiter(s) IV Push every 15 minutes  enoxaparin Injectable 40 milliGRAM(s) SubCutaneous daily  finasteride 5 milliGRAM(s) Oral daily  gabapentin 300 milliGRAM(s) Oral three times a day  gemfibrozil 600 milliGRAM(s) Oral two times a day  glucagon  Injectable 1 milliGRAM(s) IntraMuscular once  insulin glargine Injectable (LANTUS) 20 Unit(s) SubCutaneous at bedtime  insulin lispro (ADMELOG) corrective regimen sliding scale   SubCutaneous three times a day before meals  insulin lispro (ADMELOG) corrective regimen sliding scale   SubCutaneous at bedtime  insulin lispro Injectable (ADMELOG) 10 Unit(s) SubCutaneous three times a day before meals  metoprolol tartrate 25 milliGRAM(s) Oral two times a day  pantoprazole    Tablet 40 milliGRAM(s) Oral before breakfast  polyethylene glycol 3350 17 Gram(s) Oral daily  senna 2 Tablet(s) Oral at bedtime  tamsulosin 0.4 milliGRAM(s) Oral at bedtime      Physical Exam  General: Patient comfortable in bed  Vital Signs Last 12 Hrs  T(F): 98.4 (09-01-21 @ 11:07), Max: 98.4 (09-01-21 @ 11:07)  HR: 69 (09-01-21 @ 11:07) (69 - 73)  BP: 104/69 (09-01-21 @ 11:07) (104/69 - 119/69)  BP(mean): --  RR: 18 (09-01-21 @ 11:07) (18 - 18)  SpO2: 95% (09-01-21 @ 11:07) (95% - 95%)  Neck: No palpable thyroid nodules.  CVS: S1S2, No murmurs  Respiratory: No wheezing, no crepitations  GI: Abdomen soft, bowel sounds positive  Musculoskeletal:  edema lower extremities.   Skin: No skin rashes, no ecchymosis    Diagnostics

## 2021-09-01 NOTE — PROGRESS NOTE ADULT - SUBJECTIVE AND OBJECTIVE BOX
VITAL SIGNS    Subjective: "I'm feeling ok." Denies CP, palpitation, SOB, HOLLEY, HA, dizziness, N/V/D, fever or chills.  No acute event noted overnight.     Telemetry: NSR 70-80    Vital Signs Last 24 Hrs  T(C): 36.9 (21 @ 11:07), Max: 37.1 (21 @ 20:18)  T(F): 98.4 (21 @ 11:07), Max: 98.8 (21 @ 20:18)  HR: 69 (21 @ 11:07) (69 - 78)  BP: 104/69 (21 @ 11:07) (104/69 - 119/69)  RR: 18 (21 @ 11:07) (18 - 18)  SpO2: 95% (21 @ 11:07) (91% - 100%)            @ 07:  -   @ 07:00  --------------------------------------------------------  IN: 1280 mL / OUT: 2675 mL / NET: -1395 mL     @ 07:  -   @ 16:48  --------------------------------------------------------  IN: 480 mL / OUT: 300 mL / NET: 180 mL    Daily     Daily Weight in k.3 (01 Sep 2021 08:23)    CAPILLARY BLOOD GLUCOSE    POCT Blood Glucose.: 215 mg/dL (01 Sep 2021 16:45)  POCT Blood Glucose.: 226 mg/dL (01 Sep 2021 11:34)  POCT Blood Glucose.: 91 mg/dL (01 Sep 2021 07:44)  POCT Blood Glucose.: 95 mg/dL (01 Sep 2021 04:16)  POCT Blood Glucose.: 75 mg/dL (01 Sep 2021 03:46)  POCT Blood Glucose.: 111 mg/dL (31 Aug 2021 21:31)  POCT Blood Glucose.: 100 mg/dL (31 Aug 2021 16:55)        PHYSICAL EXAM    Neurology: alert and oriented x 3, nonfocal, no gross deficits    CV: (+) S1 and S2, No murmurs, rubs, gallops or clicks     Sternal Wound: MSI -->CDI , sternum stable; MS silks sutures in place     Lungs: CTA B/L     Abdomen: soft, nontender, nondistended, positive bowel sounds, (+) Flatus; (+) BM     :  Voiding               Extremities:  B/L LE (+) 1 edema; negative calf tenderness; (+) 2 DP palpable        acetaminophen Tablet .. 650 milliGRAM(s) Oral every 6 hours PRN  aMIOdarone Tablet 400 milliGRAM(s) Oral two times a day  aspirin enteric coated 325 milliGRAM(s) Oral daily  atorvastatin 80 milliGRAM(s) Oral at bedtime  bisacodyl Suppository 10 milliGRAM(s) Rectal daily PRN  dextrose 40% Gel 15 Gram(s) Oral once  dextrose 50% Injectable 50 milliLiter(s) IV Push every 15 minutes  dextrose 50% Injectable 25 milliLiter(s) IV Push every 15 minutes  enoxaparin Injectable 40 milliGRAM(s) SubCutaneous daily  finasteride 5 milliGRAM(s) Oral daily  gabapentin 300 milliGRAM(s) Oral three times a day  gemfibrozil 600 milliGRAM(s) Oral two times a day  glucagon  Injectable 1 milliGRAM(s) IntraMuscular once  insulin glargine Injectable (LANTUS) 20 Unit(s) SubCutaneous at bedtime  insulin lispro (ADMELOG) corrective regimen sliding scale   SubCutaneous three times a day before meals  insulin lispro (ADMELOG) corrective regimen sliding scale   SubCutaneous at bedtime  insulin lispro Injectable (ADMELOG) 10 Unit(s) SubCutaneous three times a day before meals  metoprolol tartrate 25 milliGRAM(s) Oral two times a day  oxyCODONE  IR 5 milliGRAM(s) Oral every 6 hours PRN  pantoprazole Tablet 40 milliGRAM(s) Oral before breakfast  polyethylene glycol 3350 17 Gram(s) Oral daily  senna 2 Tablet(s) Oral at bedtime  sodium chloride 0.9% lock flush 10 milliLiter(s) IV Push every 1 hour PRN  tamsulosin 0.4 milliGRAM(s) Oral at bedtime    Physical Therapy Rec:   Home  [  ]   Home w/ PT  [ X ]  Rehab  [  ]    Discussed with Cardiothoracic Team at AM rounds.     VITAL SIGNS    Subjective: "I'm feeling ok." Denies CP, palpitation, SOB, HOLLEY, HA, dizziness, N/V/D, fever or chills.  No acute event noted overnight.     Telemetry: NSR 70-80    Vital Signs Last 24 Hrs  T(C): 36.9 (21 @ 11:07), Max: 37.1 (21 @ 20:18)  T(F): 98.4 (21 @ 11:07), Max: 98.8 (21 @ 20:18)  HR: 69 (21 @ 11:07) (69 - 78)  BP: 104/69 (21 @ 11:07) (104/69 - 119/69)  RR: 18 (21 @ 11:07) (18 - 18)  SpO2: 95% (21 @ 11:07) (91% - 100%)            @ 07:  -   @ 07:00  --------------------------------------------------------  IN: 1280 mL / OUT: 2675 mL / NET: -1395 mL     @ 07:  -   @ 16:48  --------------------------------------------------------  IN: 480 mL / OUT: 300 mL / NET: 180 mL    Daily     Daily Weight in k.3 (01 Sep 2021 08:23)    CAPILLARY BLOOD GLUCOSE    POCT Blood Glucose.: 215 mg/dL (01 Sep 2021 16:45)  POCT Blood Glucose.: 226 mg/dL (01 Sep 2021 11:34)  POCT Blood Glucose.: 91 mg/dL (01 Sep 2021 07:44)  POCT Blood Glucose.: 95 mg/dL (01 Sep 2021 04:16)  POCT Blood Glucose.: 75 mg/dL (01 Sep 2021 03:46)  POCT Blood Glucose.: 111 mg/dL (31 Aug 2021 21:31)  POCT Blood Glucose.: 100 mg/dL (31 Aug 2021 16:55)        PHYSICAL EXAM    Neurology: alert and oriented x 3, nonfocal, no gross deficits    CV: (+) S1 and S2, No murmurs, rubs, gallops or clicks     Sternal Wound: MSI -->CDI, sternum stable; MS silks sutures in place     Lungs: CTA B/L     Abdomen: soft, nontender, nondistended, positive bowel sounds, (+) Flatus; (+) BM     :  Voiding               Extremities:  B/L LE (+) 1 edema; negative calf tenderness; (+) 2 DP palpable.       acetaminophen Tablet .. 650 milliGRAM(s) Oral every 6 hours PRN  aMIOdarone Tablet 400 milliGRAM(s) Oral two times a day  aspirin enteric coated 325 milliGRAM(s) Oral daily  atorvastatin 80 milliGRAM(s) Oral at bedtime  bisacodyl Suppository 10 milliGRAM(s) Rectal daily PRN  dextrose 40% Gel 15 Gram(s) Oral once  dextrose 50% Injectable 50 milliLiter(s) IV Push every 15 minutes  dextrose 50% Injectable 25 milliLiter(s) IV Push every 15 minutes  enoxaparin Injectable 40 milliGRAM(s) SubCutaneous daily  finasteride 5 milliGRAM(s) Oral daily  gabapentin 300 milliGRAM(s) Oral three times a day  gemfibrozil 600 milliGRAM(s) Oral two times a day  glucagon  Injectable 1 milliGRAM(s) IntraMuscular once  insulin glargine Injectable (LANTUS) 20 Unit(s) SubCutaneous at bedtime  insulin lispro (ADMELOG) corrective regimen sliding scale SubCutaneous three times a day before meals  insulin lispro (ADMELOG) corrective regimen sliding scale SubCutaneous at bedtime  insulin lispro Injectable (ADMELOG) 10 Unit(s) SubCutaneous three times a day before meals  metoprolol tartrate 25 milliGRAM(s) Oral two times a day  oxyCODONE  IR 5 milliGRAM(s) Oral every 6 hours PRN  pantoprazole Tablet 40 milliGRAM(s) Oral before breakfast  polyethylene glycol 3350 17 Gram(s) Oral daily  senna 2 Tablet(s) Oral at bedtime  sodium chloride 0.9% lock flush 10 milliLiter(s) IV Push every 1 hour PRN  tamsulosin 0.4 milliGRAM(s) Oral at bedtime    Physical Therapy Rec:   Home  [  ]   Home w/ PT  [ X ]  Rehab  [  ]    Discussed with Cardiothoracic Team at AM rounds.

## 2021-09-01 NOTE — PROGRESS NOTE ADULT - PROBLEM SELECTOR PLAN 1
Will decrease Lantus to 20 units at bed time.  Will decrease Admelog to 10 units before each meal and continue Admelog correction scale coverage. Will continue monitoring FS and FU.  Patient has moderate insulin requirement and would benefit from insulin as outpatient. Counseled in great detail on importance of tight glycemic control, he is reluctant though agreeable. Suggest to start insulin administration teaching. Will continue monitoring and FU DC recommendations.  Patient counseled for compliance with consistent low carb diet and exercise as tolerated outpatient.

## 2021-09-01 NOTE — PROGRESS NOTE ADULT - ASSESSMENT
65 year old M, (denies implanted devices) with significant PMHx of T2DM (last HgbA1c 8.1, managed by PCP, complicated by peripheral neuropathy), HLD, HTN, BPH presented for RHC/LHC. Patient c/o chest discomfort with exertion that has been becoming progressively worse and resolves with rest associated with SOB.  Patient recently went to [facility] ER on 7/29 and patient states results were normal. Patient seen and evaluated by Dr. Celestin and presented for further evaluation and cardiac Cath.  Cath finding revealed: proximal left main 50 %, proximal LAD 95 % stenosis, proximal circumflex: 95 % and proximal RCA: 95 % stenosis. CTS consult called to evaluate patient for cardiac surgery with Dr. Deep Valerio.   8/22 P2Y12 80. Continue with BBlocker, statin asa. CABG tentatively scheduled in am pending repeat p2y12 in am 0400  8/23 CABG postponed P2Y12> 92 this am  Endo consulted elevated BS, Start statin (lopid at home)   ASA betablocker CAD Recheck P2Y12 am  8/24 P2Y12 @ 5pm-->   T&S to be sent at 5pm as well.   8/25 VVS; Hyperglycemia --> BFG > 200.  Transferred to SDU for Insulin gtt.  NPO after midnight  8/26 Off isulin gtt, resumed this  ирина, OR tomorrow.   8/27/21: s/p CABG x 3  Post op Course:   8/30/21: Orthostatic in ICU, given volume resuscitation, transferred to floor, VSS, weaning to room air, ambulation, IS. ASA/BB/STATIN    8/31 Asymptomatic hypotension --> BP 88/60, Brief rate controlled afib --> increased Lopressor to 25 mg PO q12 and Lasix 20 x 1 today for edema.   9/1 VVS; Continue on modified Amio load 400 mg PO BID.  Now NSR; contine   65 year old M, (denies implanted devices) with significant PMHx of T2DM (last HgbA1c 8.1, managed by PCP, complicated by peripheral neuropathy), HLD, HTN, BPH presented for RHC/LHC. Patient c/o chest discomfort with exertion that has been becoming progressively worse and resolves with rest associated with SOB.  Patient recently went to [facility] ER on 7/29 and patient states results were normal. Patient seen and evaluated by Dr. Celestin and presented for further evaluation and cardiac Cath.  Cath finding revealed: proximal left main 50 %, proximal LAD 95 % stenosis, proximal circumflex: 95 % and proximal RCA: 95 % stenosis. CTS consult called to evaluate patient for cardiac surgery with Dr. Deep Valerio.   8/22 P2Y12 80. Continue with BBlocker, statin asa. CABG tentatively scheduled in am pending repeat p2y12 in am 0400  8/23 CABG postponed P2Y12> 92 this am  Endo consulted elevated BS, Start statin (lopid at home)   ASA betablocker CAD Recheck P2Y12 am  8/24 P2Y12 @ 5pm-->   T&S to be sent at 5pm as well.   8/25 VVS; Hyperglycemia --> BFG > 200.  Transferred to SDU for Insulin gtt.  NPO after midnight  8/26 Off isulin gtt, resumed this  ирина, OR tomorrow.   8/27/21: s/p CABG x 3  Post op Course:   8/30/21: Orthostatic in ICU, given volume resuscitation, transferred to floor, VSS, weaning to room air, ambulation, IS. ASA/BB/STATIN    8/31 Asymptomatic hypotension --> BP 88/60, Brief rate controlled afib --> increased Lopressor to 25 mg PO q12 and Lasix 20 x 1 today for edema.   9/1 VVS; Continue on modified Amio load 400 mg PO BID.  Now NSR; Continue with current medication regimen.  Insulin teaching using teach back method.   Disposition: Home once medically cleared.

## 2021-09-01 NOTE — PROGRESS NOTE ADULT - ASSESSMENT
Assessment  DMT2: 65y Male with DM T2 with hyperglycemia, A1C 7.1%, was on several oral meds at home, postop on basal bolus insulin, blood sugars are trending down, no hypoglycemic episodes, eating meals, appears alert and comfortable.  CAD: on medications, no chest pain, stable, monitored.  HTN: Controlled,  on antihypertensive medications.  Obesity: No strict exercise routines, not on any weight loss program, neither on low calorie diet.        Cb Hill MD  Cell: [phone]  Office: [phone]     Assessment  DMT2: 65y Male with DM T2 with hyperglycemia, A1C 7.1%, was on several oral meds at home, postop on basal bolus insulin, blood sugars are trending down, no hypoglycemic episodes, eating meals, appears  alert and comfortable.  CAD: on medications, no chest pain, stable, monitored.  HTN: Controlled,  on antihypertensive medications.  Obesity: No strict exercise routines, not on any weight loss program, neither on low calorie diet.        Cb Hill MD  Cell: [phone]  Office: [phone]

## 2021-09-01 NOTE — PROGRESS NOTE ADULT - PROBLEM SELECTOR PLAN 2
A1c 8, Continue lantus 24U QHS, Humalog 8U TID and iSS  -may require insulin for discharge  -endocrine team following. Continue with  mg PO Daily.   Continue with Lopressor 25 mg PO BID.   Continue with Lipitor 80 mg PO HS    Increase activity as tolerated.   Continue with Lasix 20 mg PO daily and Aldactone 25 mg PO daily   Encourage Chest PT / Pulmonary toileting and Incentive spirometry every 1 hour x 10 while awake.   Continue with PUD and DVT prophylaxis.   Shower on POD #5.   D/C plan home PT once medically cleared   Plan of care discussed with attending

## 2021-09-01 NOTE — PROGRESS NOTE ADULT - PROBLEM SELECTOR PLAN 1
s/p CABG x 3, POD3, EF NL   lasix 20 x 1  -continue asa 325mg daily, atorvastatin 80mg qhs, metoprolol   25 mg BID   brief afib this am  -volume resuscitated  200 cc this am  -wean to room air.    hct 23   follow HCT Endo Following   Continue on current glycemic regimen of Lantus 20 units SQ at HS and pre meal Admelog 10 units SQ TID   Accuchecks 4 times a day AC and HS and cover with Admelog corrective scale   Continue with DASH/ Diabetic Diet.  Insulin teaching using teach back method.

## 2021-09-02 ENCOUNTER — TRANSCRIPTION ENCOUNTER (OUTPATIENT)
Age: 66
End: 2021-09-02

## 2021-09-02 LAB
ANION GAP SERPL CALC-SCNC: 11 MMOL/L — SIGNIFICANT CHANGE UP (ref 5–17)
BUN SERPL-MCNC: 27 MG/DL — HIGH (ref 7–23)
CALCIUM SERPL-MCNC: 8.5 MG/DL — SIGNIFICANT CHANGE UP (ref 8.4–10.5)
CHLORIDE SERPL-SCNC: 104 MMOL/L — SIGNIFICANT CHANGE UP (ref 96–108)
CO2 SERPL-SCNC: 19 MMOL/L — LOW (ref 22–31)
CREAT SERPL-MCNC: 1.09 MG/DL — SIGNIFICANT CHANGE UP (ref 0.5–1.3)
GLUCOSE BLDC GLUCOMTR-MCNC: 111 MG/DL — HIGH (ref 70–99)
GLUCOSE BLDC GLUCOMTR-MCNC: 151 MG/DL — HIGH (ref 70–99)
GLUCOSE BLDC GLUCOMTR-MCNC: 82 MG/DL — SIGNIFICANT CHANGE UP (ref 70–99)
GLUCOSE BLDC GLUCOMTR-MCNC: 90 MG/DL — SIGNIFICANT CHANGE UP (ref 70–99)
GLUCOSE SERPL-MCNC: 110 MG/DL — HIGH (ref 70–99)
HCT VFR BLD CALC: 25.8 % — LOW (ref 39–50)
HGB BLD-MCNC: 8 G/DL — LOW (ref 13–17)
MCHC RBC-ENTMCNC: 29.5 PG — SIGNIFICANT CHANGE UP (ref 27–34)
MCHC RBC-ENTMCNC: 31 GM/DL — LOW (ref 32–36)
MCV RBC AUTO: 95.2 FL — SIGNIFICANT CHANGE UP (ref 80–100)
NRBC # BLD: 0 /100 WBCS — SIGNIFICANT CHANGE UP (ref 0–0)
PLATELET # BLD AUTO: 369 K/UL — SIGNIFICANT CHANGE UP (ref 150–400)
POTASSIUM SERPL-MCNC: 3.9 MMOL/L — SIGNIFICANT CHANGE UP (ref 3.5–5.3)
POTASSIUM SERPL-SCNC: 3.9 MMOL/L — SIGNIFICANT CHANGE UP (ref 3.5–5.3)
RBC # BLD: 2.71 M/UL — LOW (ref 4.2–5.8)
RBC # FLD: 14 % — SIGNIFICANT CHANGE UP (ref 10.3–14.5)
SARS-COV-2 RNA SPEC QL NAA+PROBE: SIGNIFICANT CHANGE UP
SODIUM SERPL-SCNC: 134 MMOL/L — LOW (ref 135–145)
WBC # BLD: 7.88 K/UL — SIGNIFICANT CHANGE UP (ref 3.8–10.5)
WBC # FLD AUTO: 7.88 K/UL — SIGNIFICANT CHANGE UP (ref 3.8–10.5)

## 2021-09-02 PROCEDURE — 71045 X-RAY EXAM CHEST 1 VIEW: CPT | Mod: 26

## 2021-09-02 RX ORDER — SPIRONOLACTONE 25 MG/1
25 TABLET, FILM COATED ORAL DAILY
Refills: 0 | Status: DISCONTINUED | OUTPATIENT
Start: 2021-09-02 | End: 2021-09-03

## 2021-09-02 RX ORDER — FUROSEMIDE 40 MG
20 TABLET ORAL DAILY
Refills: 0 | Status: DISCONTINUED | OUTPATIENT
Start: 2021-09-02 | End: 2021-09-03

## 2021-09-02 RX ORDER — INSULIN LISPRO 100/ML
9 VIAL (ML) SUBCUTANEOUS
Refills: 0 | Status: DISCONTINUED | OUTPATIENT
Start: 2021-09-02 | End: 2021-09-03

## 2021-09-02 RX ADMIN — Medication 10 UNIT(S): at 08:20

## 2021-09-02 RX ADMIN — Medication 9 UNIT(S): at 12:44

## 2021-09-02 RX ADMIN — Medication 600 MILLIGRAM(S): at 18:03

## 2021-09-02 RX ADMIN — Medication 325 MILLIGRAM(S): at 12:45

## 2021-09-02 RX ADMIN — Medication 600 MILLIGRAM(S): at 05:02

## 2021-09-02 RX ADMIN — GABAPENTIN 300 MILLIGRAM(S): 400 CAPSULE ORAL at 05:02

## 2021-09-02 RX ADMIN — FINASTERIDE 5 MILLIGRAM(S): 5 TABLET, FILM COATED ORAL at 12:46

## 2021-09-02 RX ADMIN — AMIODARONE HYDROCHLORIDE 400 MILLIGRAM(S): 400 TABLET ORAL at 18:04

## 2021-09-02 RX ADMIN — ATORVASTATIN CALCIUM 80 MILLIGRAM(S): 80 TABLET, FILM COATED ORAL at 21:30

## 2021-09-02 RX ADMIN — GABAPENTIN 300 MILLIGRAM(S): 400 CAPSULE ORAL at 13:09

## 2021-09-02 RX ADMIN — AMIODARONE HYDROCHLORIDE 400 MILLIGRAM(S): 400 TABLET ORAL at 05:02

## 2021-09-02 RX ADMIN — SPIRONOLACTONE 25 MILLIGRAM(S): 25 TABLET, FILM COATED ORAL at 08:19

## 2021-09-02 RX ADMIN — TAMSULOSIN HYDROCHLORIDE 0.4 MILLIGRAM(S): 0.4 CAPSULE ORAL at 21:31

## 2021-09-02 RX ADMIN — POLYETHYLENE GLYCOL 3350 17 GRAM(S): 17 POWDER, FOR SOLUTION ORAL at 12:46

## 2021-09-02 RX ADMIN — PANTOPRAZOLE SODIUM 40 MILLIGRAM(S): 20 TABLET, DELAYED RELEASE ORAL at 05:02

## 2021-09-02 RX ADMIN — ENOXAPARIN SODIUM 40 MILLIGRAM(S): 100 INJECTION SUBCUTANEOUS at 12:47

## 2021-09-02 RX ADMIN — OXYCODONE HYDROCHLORIDE 5 MILLIGRAM(S): 5 TABLET ORAL at 21:30

## 2021-09-02 RX ADMIN — Medication 25 MILLIGRAM(S): at 18:07

## 2021-09-02 RX ADMIN — OXYCODONE HYDROCHLORIDE 5 MILLIGRAM(S): 5 TABLET ORAL at 22:00

## 2021-09-02 RX ADMIN — Medication 20 MILLIGRAM(S): at 08:02

## 2021-09-02 RX ADMIN — GABAPENTIN 300 MILLIGRAM(S): 400 CAPSULE ORAL at 21:31

## 2021-09-02 RX ADMIN — SENNA PLUS 2 TABLET(S): 8.6 TABLET ORAL at 21:31

## 2021-09-02 RX ADMIN — Medication 9 UNIT(S): at 18:03

## 2021-09-02 RX ADMIN — Medication 25 MILLIGRAM(S): at 05:02

## 2021-09-02 RX ADMIN — INSULIN GLARGINE 20 UNIT(S): 100 INJECTION, SOLUTION SUBCUTANEOUS at 21:30

## 2021-09-02 NOTE — PROGRESS NOTE ADULT - PROBLEM SELECTOR PLAN 2
Continue with  mg PO Daily.   Continue with Lopressor 25 mg PO BID.   Continue with Lipitor 80 mg PO HS    Increase activity as tolerated.   Continue with Lasix 20 mg PO daily and Aldactone 25 mg PO daily   Encourage Chest PT / Pulmonary toileting and Incentive spirometry every 1 hour x 10 while awake.   Continue with PUD and DVT prophylaxis.   Shower on POD #5.   D/C plan home PT once medically cleared   Plan of care discussed with attending

## 2021-09-02 NOTE — PROGRESS NOTE ADULT - SUBJECTIVE AND OBJECTIVE BOX
Chief complaint  Patient is a 65y old  Male who presents with a chief complaint of Cardiac Surgery (01 Sep 2021 16:48)   Review of systems  Patient in bed, looks comfortable, no hypoglycemic episodes.    Labs and Fingersticks  CAPILLARY BLOOD GLUCOSE      POCT Blood Glucose.: 82 mg/dL (02 Sep 2021 12:22)  POCT Blood Glucose.: 111 mg/dL (02 Sep 2021 08:09)  POCT Blood Glucose.: 103 mg/dL (01 Sep 2021 22:22)  POCT Blood Glucose.: 215 mg/dL (01 Sep 2021 16:45)      Anion Gap, Serum: 11 (09-02 @ 07:35)  Anion Gap, Serum: 11 (09-01 @ 06:58)      Calcium, Total Serum: 8.5 (09-02 @ 07:35)  Calcium, Total Serum: 8.6 (09-01 @ 06:58)          09-02    134<L>  |  104  |  27<H>  ----------------------------<  110<H>  3.9   |  19<L>  |  1.09    Ca    8.5      02 Sep 2021 07:35                          8.0    7.88  )-----------( 369      ( 02 Sep 2021 07:36 )             25.8     Medications  MEDICATIONS  (STANDING):  aMIOdarone    Tablet 400 milliGRAM(s) Oral two times a day  aspirin enteric coated 325 milliGRAM(s) Oral daily  atorvastatin 80 milliGRAM(s) Oral at bedtime  dextrose 40% Gel 15 Gram(s) Oral once  dextrose 50% Injectable 50 milliLiter(s) IV Push every 15 minutes  dextrose 50% Injectable 25 milliLiter(s) IV Push every 15 minutes  enoxaparin Injectable 40 milliGRAM(s) SubCutaneous daily  finasteride 5 milliGRAM(s) Oral daily  furosemide    Tablet 20 milliGRAM(s) Oral daily  gabapentin 300 milliGRAM(s) Oral three times a day  gemfibrozil 600 milliGRAM(s) Oral two times a day  glucagon  Injectable 1 milliGRAM(s) IntraMuscular once  insulin glargine Injectable (LANTUS) 20 Unit(s) SubCutaneous at bedtime  insulin lispro (ADMELOG) corrective regimen sliding scale   SubCutaneous three times a day before meals  insulin lispro (ADMELOG) corrective regimen sliding scale   SubCutaneous at bedtime  insulin lispro Injectable (ADMELOG) 9 Unit(s) SubCutaneous three times a day before meals  metoprolol tartrate 25 milliGRAM(s) Oral two times a day  pantoprazole    Tablet 40 milliGRAM(s) Oral before breakfast  polyethylene glycol 3350 17 Gram(s) Oral daily  senna 2 Tablet(s) Oral at bedtime  spironolactone 25 milliGRAM(s) Oral daily  tamsulosin 0.4 milliGRAM(s) Oral at bedtime      Physical Exam  General: Patient comfortable in bed  Vital Signs Last 12 Hrs  T(F): 97.9 (09-02-21 @ 11:58), Max: 98.6 (09-02-21 @ 04:49)  HR: 71 (09-02-21 @ 11:58) (70 - 71)  BP: 115/68 (09-02-21 @ 11:58) (110/69 - 115/68)  BP(mean): --  RR: 18 (09-02-21 @ 11:58) (18 - 18)  SpO2: 95% (09-02-21 @ 11:58) (95% - 97%)  Neck: No palpable thyroid nodules.  CVS: S1S2, No murmurs  Respiratory: No wheezing, no crepitations  GI: Abdomen soft, bowel sounds positive  Musculoskeletal:  edema lower extremities.   Skin: No skin rashes, no ecchymosis    Diagnostics             Chief complaint  Patient is a 65y old  Male who presents with a chief complaint of Cardiac Surgery (01 Sep 2021 16:48)   Review of systems  Patient in bed, looks comfortable, no hypoglycemic episodes.    Labs and Fingersticks  CAPILLARY BLOOD GLUCOSE    POCT Blood Glucose.: 82 mg/dL (02 Sep 2021 12:22)  POCT Blood Glucose.: 111 mg/dL (02 Sep 2021 08:09)  POCT Blood Glucose.: 103 mg/dL (01 Sep 2021 22:22)  POCT Blood Glucose.: 215 mg/dL (01 Sep 2021 16:45)      Anion Gap, Serum: 11 (09-02 @ 07:35)  Anion Gap, Serum: 11 (09-01 @ 06:58)      Calcium, Total Serum: 8.5 (09-02 @ 07:35)  Calcium, Total Serum: 8.6 (09-01 @ 06:58)          09-02    134<L>  |  104  |  27<H>  ----------------------------<  110<H>  3.9   |  19<L>  |  1.09    Ca    8.5      02 Sep 2021 07:35                          8.0    7.88  )-----------( 369      ( 02 Sep 2021 07:36 )             25.8     Medications  MEDICATIONS  (STANDING):  aMIOdarone    Tablet 400 milliGRAM(s) Oral two times a day  aspirin enteric coated 325 milliGRAM(s) Oral daily  atorvastatin 80 milliGRAM(s) Oral at bedtime  dextrose 40% Gel 15 Gram(s) Oral once  dextrose 50% Injectable 50 milliLiter(s) IV Push every 15 minutes  dextrose 50% Injectable 25 milliLiter(s) IV Push every 15 minutes  enoxaparin Injectable 40 milliGRAM(s) SubCutaneous daily  finasteride 5 milliGRAM(s) Oral daily  furosemide    Tablet 20 milliGRAM(s) Oral daily  gabapentin 300 milliGRAM(s) Oral three times a day  gemfibrozil 600 milliGRAM(s) Oral two times a day  glucagon  Injectable 1 milliGRAM(s) IntraMuscular once  insulin glargine Injectable (LANTUS) 20 Unit(s) SubCutaneous at bedtime  insulin lispro (ADMELOG) corrective regimen sliding scale   SubCutaneous three times a day before meals  insulin lispro (ADMELOG) corrective regimen sliding scale   SubCutaneous at bedtime  insulin lispro Injectable (ADMELOG) 9 Unit(s) SubCutaneous three times a day before meals  metoprolol tartrate 25 milliGRAM(s) Oral two times a day  pantoprazole    Tablet 40 milliGRAM(s) Oral before breakfast  polyethylene glycol 3350 17 Gram(s) Oral daily  senna 2 Tablet(s) Oral at bedtime  spironolactone 25 milliGRAM(s) Oral daily  tamsulosin 0.4 milliGRAM(s) Oral at bedtime      Physical Exam  General: Patient comfortable in bed  Vital Signs Last 12 Hrs  T(F): 97.9 (09-02-21 @ 11:58), Max: 98.6 (09-02-21 @ 04:49)  HR: 71 (09-02-21 @ 11:58) (70 - 71)  BP: 115/68 (09-02-21 @ 11:58) (110/69 - 115/68)  BP(mean): --  RR: 18 (09-02-21 @ 11:58) (18 - 18)  SpO2: 95% (09-02-21 @ 11:58) (95% - 97%)  Neck: No palpable thyroid nodules.  CVS: S1S2, No murmurs  Respiratory: No wheezing, no crepitations  GI: Abdomen soft, bowel sounds positive  Musculoskeletal:  edema lower extremities.   Skin: No skin rashes, no ecchymosis    Diagnostics

## 2021-09-02 NOTE — PROGRESS NOTE ADULT - SUBJECTIVE AND OBJECTIVE BOX
VITAL SIGNS-Telemetry:  SR 60-80  Vital Signs Last 24 Hrs  T(C): 36.6 (09-02-21 @ 11:58), Max: 37.3 (09-01-21 @ 20:40)  T(F): 97.9 (09-02-21 @ 11:58), Max: 99.1 (09-01-21 @ 20:40)  HR: 71 (09-02-21 @ 11:58) (68 - 71)  BP: 115/68 (09-02-21 @ 11:58) (98/59 - 115/68)  RR: 18 (09-02-21 @ 11:58) (18 - 18)  SpO2: 95% (09-02-21 @ 11:58) (95% - 97%)         09-01 @ 07:01  -  09-02 @ 07:00  --------------------------------------------------------  IN: 980 mL / OUT: 1450 mL / NET: -470 mL    09-02 @ 07:01  -  09-02 @ 12:44  --------------------------------------------------------  IN: 360 mL / OUT: 350 mL / NET: 10 mL    Daily     Daily     CAPILLARY BLOOD GLUCOSE  POCT Blood Glucose.: 82 mg/dL (02 Sep 2021 12:22)  POCT Blood Glucose.: 111 mg/dL (02 Sep 2021 08:09)  POCT Blood Glucose.: 103 mg/dL (01 Sep 2021 22:22)  POCT Blood Glucose.: 215 mg/dL (01 Sep 2021 16:45)     PHYSICAL EXAM:  Neurology: alert and oriented x 3, nonfocal, no gross deficits  CV : S1S2  Sternal Wound :  CDI , Stable  Lungs: CTA  Abdomen: soft, nontender, nondistended, positive bowel sounds, last bowel movement     9/1    Extremities:     +edema b/l , ace wraps b/l  RLE iNC cdi.  no calf tenderness    acetaminophen   Tablet .. 650 milliGRAM(s) Oral every 6 hours PRN  aMIOdarone    Tablet 400 milliGRAM(s) Oral two times a day  aspirin enteric coated 325 milliGRAM(s) Oral daily  atorvastatin 80 milliGRAM(s) Oral at bedtime  bisacodyl Suppository 10 milliGRAM(s) Rectal daily PRN  dextrose 40% Gel 15 Gram(s) Oral once  dextrose 50% Injectable 50 milliLiter(s) IV Push every 15 minutes  dextrose 50% Injectable 25 milliLiter(s) IV Push every 15 minutes  enoxaparin Injectable 40 milliGRAM(s) SubCutaneous daily  finasteride 5 milliGRAM(s) Oral daily  furosemide    Tablet 20 milliGRAM(s) Oral daily  gabapentin 300 milliGRAM(s) Oral three times a day  gemfibrozil 600 milliGRAM(s) Oral two times a day  glucagon  Injectable 1 milliGRAM(s) IntraMuscular once  insulin glargine Injectable (LANTUS) 20 Unit(s) SubCutaneous at bedtime  insulin lispro (ADMELOG) corrective regimen sliding scale   SubCutaneous three times a day before meals  insulin lispro (ADMELOG) corrective regimen sliding scale   SubCutaneous at bedtime  insulin lispro Injectable (ADMELOG) 9 Unit(s) SubCutaneous three times a day before meals  metoprolol tartrate 25 milliGRAM(s) Oral two times a day  oxyCODONE    IR 5 milliGRAM(s) Oral every 6 hours PRN  pantoprazole    Tablet 40 milliGRAM(s) Oral before breakfast  polyethylene glycol 3350 17 Gram(s) Oral daily  senna 2 Tablet(s) Oral at bedtime  sodium chloride 0.9% lock flush 10 milliLiter(s) IV Push every 1 hour PRN  spironolactone 25 milliGRAM(s) Oral daily  tamsulosin 0.4 milliGRAM(s) Oral at bedtime    Physical Therapy Rec:   Home  [  x]   Home w/ PT  [  ]  Rehab  [  ]  Discussed with Cardiothoracic Team at AM rounds.

## 2021-09-02 NOTE — PROGRESS NOTE ADULT - ASSESSMENT
Assessment  DMT2: 65y Male with DM T2 with hyperglycemia, A1C 7.1%, was on several oral meds at home, postop on basal bolus insulin, decreased dose, blood sugars in acceptable range this AM and fluctuating/trending down postprandially, no hypoglycemic episodes, eating meals, appears alert and comfortable, insulin administration teaching completed.  CAD: on medications, no chest pain, stable, monitored.  HTN: Controlled,  on antihypertensive medications.  Obesity: No strict exercise routines, not on any weight loss program, neither on low calorie diet.        Cb Hill MD  Cell: [phone]  Office: [phone]     Assessment  DMT2: 65y Male with DM T2 with hyperglycemia, A1C 7.1%, was on several oral meds at home, postop on basal bolus insulin, decreased dose, blood sugars in acceptable range this AM and fluctuating/trending down postprandially,  no hypoglycemic episodes, eating meals, appears alert and comfortable, insulin administration teaching completed.  CAD: on medications, no chest pain, stable, monitored.  HTN: Controlled,  on antihypertensive medications.  Obesity: No strict exercise routines, not on any weight loss program, neither on low calorie diet.        Cb Hill MD  Cell: [phone]  Office: [phone]

## 2021-09-02 NOTE — DISCHARGE NOTE NURSING/CASE MANAGEMENT/SOCIAL WORK - PATIENT PORTAL LINK FT
You can access the FollowMyHealth Patient Portal offered by [facility] by registering at the following website: [email or web address]. By joining [facility]’s FollowMyHealth portal, you will also be able to view your health information using other applications (apps) compatible with our system.

## 2021-09-02 NOTE — PROGRESS NOTE ADULT - PROBLEM SELECTOR PLAN 1
Endo Following   Continue on current glycemic regimen of Lantus 20 units SQ at HS and pre meal Admelog 10 units SQ TID   Accuchecks 4 times a day AC and HS and cover with Admelog corrective scale   Continue with DASH/ Diabetic Diet.  Insulin teaching using teach back method.

## 2021-09-02 NOTE — CHART NOTE - NSCHARTNOTEFT_GEN_A_CORE
Nutrition Follow Up Note  Patient seen for: consult received and appreciated for nutrition education for new insulin. Chart reviewed and events noted.    Per Chart: Pt is a 65 year old M with Hx of T2DM, HLD, HTN, BPH presented for RHC/LHC. 21: s/p CABG x 3.     Source: [x] Patient       [x] Medical Record        [] RN        [] Family at bedside       [] Other:    -If unable to interview patient: [] Trach/Vent/BiPAP  [] Disoriented/confused/inappropriate to interview    Diet Order:   Diet, Consistent Carbohydrate/No Snacks (21)    - Is current order appropriate/adequate? [x] Yes  []  No:     - PO intake :   [x] >75%  Adequate    [] 50-75%  Fair       [] <50%  Poor    Reports adequate PO intake and appetite. Per RN flowsheet pt typically eating >=75% meals.     - Nutrition-related concerns:      -      -       -      -  GI: Pt denies recent N/V or diarrhea. Pt with constipation. Last BM .   Bowel Regimen? [] Yes   [] No    Weights:   Daily Weight in k.3 (), Weight in k.9 (), Weight in k.2 (), Weight in k.5 ()    Nutritionally Pertinent MEDICATIONS  (STANDING):  aMIOdarone    Tablet  atorvastatin  dextrose 40% Gel  dextrose 50% Injectable  dextrose 50% Injectable  finasteride  furosemide    Tablet  gemfibrozil  glucagon  Injectable  insulin glargine Injectable (LANTUS)  insulin lispro (ADMELOG) corrective regimen sliding scale  insulin lispro (ADMELOG) corrective regimen sliding scale  insulin lispro Injectable (ADMELOG)  metoprolol tartrate  pantoprazole    Tablet  polyethylene glycol 3350  senna  spironolactone  tamsulosin    Pertinent Labs:  @ 07:35: Na 134<L>, BUN 27<H>, Cr 1.09, <H>, K+ 3.9, Phos --, Mg --, Alk Phos --, ALT/SGPT --, AST/SGOT --, HbA1c --    A1C with Estimated Average Glucose Result: 7.1 % (21 @ 09:24)  A1C with Estimated Average Glucose Result: 7.2 % (21 @ 17:00)    Finger Sticks:  POCT Blood Glucose.: 111 mg/dL ( @ 08:09)  POCT Blood Glucose.: 103 mg/dL ( @ 22:22)  POCT Blood Glucose.: 215 mg/dL ( @ 16:45)  POCT Blood Glucose.: 226 mg/dL ( @ 11:34)    Skin per nursing documentation: No pressure injuries noted.  Edema per nursing documentation:     Estimated Needs:   [] no change since previous assessment  [] recalculated:     Previous Nutrition Diagnosis:   Nutrition Diagnosis is: [] ongoing  [] resolved [] not applicable     Nutrition Care Plan:  [] In Progress  [] Achieved  [] Not applicable    New Nutrition Diagnosis: [] Not applicable    Nutrition Interventions:     Education Provided   [] Yes:  [] No:     Recommendations:          Monitoring and Evaluation:   Continue to monitor nutritional intake, tolerance to diet prescription, weights, labs, skin integrity    RD remains available upon request and will follow up per protocol  Verónica Vickers, MS, RD, CDN Pager #[phone] Nutrition Follow Up Note  Patient seen for: consult received and appreciated for nutrition education for new insulin. Chart reviewed and events noted.    Per Chart: Pt is a 65 year old M with Hx of T2DM, HLD, HTN, BPH presented for RHC/LHC. 21: s/p CABG x 3.     Source: [x] Patient       [x] Medical Record        [] RN        [] Family at bedside       [] Other:    -If unable to interview patient: [] Trach/Vent/BiPAP  [] Disoriented/confused/inappropriate to interview    Diet Order:   Diet, Consistent Carbohydrate/No Snacks (21)    - Is current order appropriate/adequate? [x] Yes  []  No:     - PO intake :   [x] >75%  Adequate    [] 50-75%  Fair       [] <50%  Poor    Reports adequate PO intake and appetite. Per RN flowsheet pt typically eating >=75% meals.     - Nutrition-related concerns:      - Per consult, pt to be discharged home on insulin (new).      - Pt on consistent carbohydrate diet and insulin         GI: Pt denies recent N/V or diarrhea. Pt with constipation. Last BM .   Bowel Regimen? [x] Yes   [] No    Weights:   Daily Weight in k.3 (), 95.5 ()  Dosing wt in k.2  Wt fluctuations noted and likely 2/2 intraoperative fluid shifts. RD will continue to trend as new wts available/able.      Nutritionally Pertinent MEDICATIONS  (STANDING):  aMIOdarone    Tablet  atorvastatin  dextrose 40% Gel  dextrose 50% Injectable  dextrose 50% Injectable  finasteride  furosemide    Tablet  gemfibrozil  glucagon  Injectable  insulin glargine Injectable (LANTUS)  insulin lispro (ADMELOG) corrective regimen sliding scale  insulin lispro (ADMELOG) corrective regimen sliding scale  insulin lispro Injectable (ADMELOG)  metoprolol tartrate  pantoprazole    Tablet  polyethylene glycol 3350  senna  spironolactone  tamsulosin    Pertinent Labs:  @ 07:35: Na 134<L>, BUN 27<H>, Cr 1.09, <H>, K+ 3.9    A1C with Estimated Average Glucose Result: 7.1 % (21 @ 09:24)  A1C with Estimated Average Glucose Result: 7.2 % (21 @ 17:00)    Finger Sticks:  POCT Blood Glucose.: 111 mg/dL ( @ 08:09)  POCT Blood Glucose.: 103 mg/dL ( @ 22:22)  POCT Blood Glucose.: 215 mg/dL ( @ 16:45)  POCT Blood Glucose.: 226 mg/dL ( @ 11:34)    Skin per nursing documentation: No pressure injuries noted. Midsternal incision   Edema per nursing documentation: None noted.     Estimated Needs:   [x] no change since previous assessment  Based on .0 lbs.  Estimated Energy Needs: (25-30 kcals/kG) 9591-4723 kcals  Estimated Protein Needs: (1.4-1.6 gm/kG)  gm  Fluid needs deferred to provider.    Previous Nutrition Diagnosis: Increased protein-energy needs.  Nutrition Diagnosis is: [x] ongoing  [] resolved [] not applicable     Nutrition Care Plan:  [x] In Progress  [] Achieved  [] Not applicable    New Nutrition Diagnosis: Food and Nutrition Related Knowledge Deficit related to lack of prior exposure to nutrition education as evidenced by pt new to insulin     Nutrition Interventions:     Education Provided   [x] Yes:  [] No: Provided verbal and written education on heart healthy nutrition for people with diabetes. Emphasis on pairing carbohydrates with protein for glycemic control; portion sizes; choosing whole grains vs refined carbohydrates; limiting saturated fat + sodium intake. Provided Heart-Healthy Consistent Carbohydrate Nutrition Therapy. Good comprehension noted. Pt made aware RD to remain available for any questions.     Recommendations:      1) Change diet to low Na, Consistent Carbohydrate with Evening Snack. RD remains available for diet changes as needed/able.   2) Glucerna x1 daily.  3) Reinforce nutrition education as able.      Monitoring and Evaluation:   Continue to monitor nutritional intake, tolerance to diet prescription, weights, labs, skin integrity    RD remains available upon request and will follow up per protocol  Verónica Vickers, MS, RD, CDN Pager #[phone]

## 2021-09-02 NOTE — PROGRESS NOTE ADULT - PROBLEM SELECTOR PLAN 1
Will continue Lantus 20 units at bed time.  Will decrease Admelog to 9 units before each meal and continue Admelog correction scale coverage. Will continue monitoring FS and FU.  Patient has moderate insulin requirement and would benefit from insulin as outpatient, insulin teaching now completed. Insulin requirement trending down; Will continue monitoring and FU DC recommendations.  Counseled in great detail on importance of tight glycemic control, counseled for compliance with consistent low carb diet and exercise as tolerated outpatient.  S/P RD eval for additional recommendations.

## 2021-09-03 LAB
ANION GAP SERPL CALC-SCNC: 12 MMOL/L — SIGNIFICANT CHANGE UP (ref 5–17)
BUN SERPL-MCNC: 26 MG/DL — HIGH (ref 7–23)
CALCIUM SERPL-MCNC: 9 MG/DL — SIGNIFICANT CHANGE UP (ref 8.4–10.5)
CHLORIDE SERPL-SCNC: 105 MMOL/L — SIGNIFICANT CHANGE UP (ref 96–108)
CO2 SERPL-SCNC: 23 MMOL/L — SIGNIFICANT CHANGE UP (ref 22–31)
CREAT SERPL-MCNC: 1.2 MG/DL — SIGNIFICANT CHANGE UP (ref 0.5–1.3)
GLUCOSE BLDC GLUCOMTR-MCNC: 117 MG/DL — HIGH (ref 70–99)
GLUCOSE BLDC GLUCOMTR-MCNC: 190 MG/DL — HIGH (ref 70–99)
GLUCOSE BLDC GLUCOMTR-MCNC: 213 MG/DL — HIGH (ref 70–99)
GLUCOSE BLDC GLUCOMTR-MCNC: 73 MG/DL — SIGNIFICANT CHANGE UP (ref 70–99)
GLUCOSE BLDC GLUCOMTR-MCNC: 83 MG/DL — SIGNIFICANT CHANGE UP (ref 70–99)
GLUCOSE SERPL-MCNC: 60 MG/DL — LOW (ref 70–99)
HCT VFR BLD CALC: 25.7 % — LOW (ref 39–50)
HGB BLD-MCNC: 8 G/DL — LOW (ref 13–17)
MCHC RBC-ENTMCNC: 30.2 PG — SIGNIFICANT CHANGE UP (ref 27–34)
MCHC RBC-ENTMCNC: 31.1 GM/DL — LOW (ref 32–36)
MCV RBC AUTO: 97 FL — SIGNIFICANT CHANGE UP (ref 80–100)
NRBC # BLD: 0 /100 WBCS — SIGNIFICANT CHANGE UP (ref 0–0)
PLATELET # BLD AUTO: 431 K/UL — HIGH (ref 150–400)
POTASSIUM SERPL-MCNC: 3.8 MMOL/L — SIGNIFICANT CHANGE UP (ref 3.5–5.3)
POTASSIUM SERPL-SCNC: 3.8 MMOL/L — SIGNIFICANT CHANGE UP (ref 3.5–5.3)
RBC # BLD: 2.65 M/UL — LOW (ref 4.2–5.8)
RBC # FLD: 14.4 % — SIGNIFICANT CHANGE UP (ref 10.3–14.5)
SODIUM SERPL-SCNC: 140 MMOL/L — SIGNIFICANT CHANGE UP (ref 135–145)
WBC # BLD: 7.93 K/UL — SIGNIFICANT CHANGE UP (ref 3.8–10.5)
WBC # FLD AUTO: 7.93 K/UL — SIGNIFICANT CHANGE UP (ref 3.8–10.5)

## 2021-09-03 RX ORDER — FUROSEMIDE 40 MG
40 TABLET ORAL
Refills: 0 | Status: DISCONTINUED | OUTPATIENT
Start: 2021-09-03 | End: 2021-09-06

## 2021-09-03 RX ORDER — SPIRONOLACTONE 25 MG/1
25 TABLET, FILM COATED ORAL EVERY 12 HOURS
Refills: 0 | Status: DISCONTINUED | OUTPATIENT
Start: 2021-09-03 | End: 2021-09-06

## 2021-09-03 RX ORDER — SORBITOL SOLUTION 70 %
30 SOLUTION, ORAL MISCELLANEOUS ONCE
Refills: 0 | Status: COMPLETED | OUTPATIENT
Start: 2021-09-03 | End: 2021-09-03

## 2021-09-03 RX ORDER — INSULIN LISPRO 100/ML
7 VIAL (ML) SUBCUTANEOUS
Refills: 0 | Status: DISCONTINUED | OUTPATIENT
Start: 2021-09-03 | End: 2021-09-04

## 2021-09-03 RX ORDER — INSULIN GLARGINE 100 [IU]/ML
15 INJECTION, SOLUTION SUBCUTANEOUS AT BEDTIME
Refills: 0 | Status: DISCONTINUED | OUTPATIENT
Start: 2021-09-03 | End: 2021-09-04

## 2021-09-03 RX ORDER — BUDESONIDE, MICRONIZED 100 %
0.5 POWDER (GRAM) MISCELLANEOUS
Refills: 0 | Status: DISCONTINUED | OUTPATIENT
Start: 2021-09-03 | End: 2021-09-06

## 2021-09-03 RX ADMIN — GABAPENTIN 300 MILLIGRAM(S): 400 CAPSULE ORAL at 05:47

## 2021-09-03 RX ADMIN — AMIODARONE HYDROCHLORIDE 400 MILLIGRAM(S): 400 TABLET ORAL at 05:47

## 2021-09-03 RX ADMIN — SENNA PLUS 2 TABLET(S): 8.6 TABLET ORAL at 21:48

## 2021-09-03 RX ADMIN — Medication 7 UNIT(S): at 08:33

## 2021-09-03 RX ADMIN — Medication 25 MILLIGRAM(S): at 05:46

## 2021-09-03 RX ADMIN — Medication 600 MILLIGRAM(S): at 05:46

## 2021-09-03 RX ADMIN — Medication 25 MILLIGRAM(S): at 17:40

## 2021-09-03 RX ADMIN — PANTOPRAZOLE SODIUM 40 MILLIGRAM(S): 20 TABLET, DELAYED RELEASE ORAL at 05:46

## 2021-09-03 RX ADMIN — Medication 600 MILLIGRAM(S): at 17:40

## 2021-09-03 RX ADMIN — ENOXAPARIN SODIUM 40 MILLIGRAM(S): 100 INJECTION SUBCUTANEOUS at 11:53

## 2021-09-03 RX ADMIN — Medication 1: at 17:14

## 2021-09-03 RX ADMIN — ATORVASTATIN CALCIUM 80 MILLIGRAM(S): 80 TABLET, FILM COATED ORAL at 21:48

## 2021-09-03 RX ADMIN — OXYCODONE HYDROCHLORIDE 5 MILLIGRAM(S): 5 TABLET ORAL at 22:20

## 2021-09-03 RX ADMIN — SPIRONOLACTONE 25 MILLIGRAM(S): 25 TABLET, FILM COATED ORAL at 17:40

## 2021-09-03 RX ADMIN — FINASTERIDE 5 MILLIGRAM(S): 5 TABLET, FILM COATED ORAL at 11:53

## 2021-09-03 RX ADMIN — GABAPENTIN 300 MILLIGRAM(S): 400 CAPSULE ORAL at 13:49

## 2021-09-03 RX ADMIN — Medication 0.5 MILLIGRAM(S): at 18:20

## 2021-09-03 RX ADMIN — Medication 0.5 MILLIGRAM(S): at 11:45

## 2021-09-03 RX ADMIN — Medication 7 UNIT(S): at 17:14

## 2021-09-03 RX ADMIN — Medication 0.5 MILLIGRAM(S): at 05:48

## 2021-09-03 RX ADMIN — Medication 325 MILLIGRAM(S): at 11:53

## 2021-09-03 RX ADMIN — AMIODARONE HYDROCHLORIDE 400 MILLIGRAM(S): 400 TABLET ORAL at 17:39

## 2021-09-03 RX ADMIN — SPIRONOLACTONE 25 MILLIGRAM(S): 25 TABLET, FILM COATED ORAL at 05:47

## 2021-09-03 RX ADMIN — Medication 30 MILLILITER(S): at 13:49

## 2021-09-03 RX ADMIN — INSULIN GLARGINE 15 UNIT(S): 100 INJECTION, SOLUTION SUBCUTANEOUS at 22:03

## 2021-09-03 RX ADMIN — Medication 40 MILLIGRAM(S): at 17:40

## 2021-09-03 RX ADMIN — TAMSULOSIN HYDROCHLORIDE 0.4 MILLIGRAM(S): 0.4 CAPSULE ORAL at 21:48

## 2021-09-03 RX ADMIN — Medication 7 UNIT(S): at 11:52

## 2021-09-03 RX ADMIN — GABAPENTIN 300 MILLIGRAM(S): 400 CAPSULE ORAL at 21:48

## 2021-09-03 RX ADMIN — OXYCODONE HYDROCHLORIDE 5 MILLIGRAM(S): 5 TABLET ORAL at 21:48

## 2021-09-03 RX ADMIN — POLYETHYLENE GLYCOL 3350 17 GRAM(S): 17 POWDER, FOR SOLUTION ORAL at 08:40

## 2021-09-03 RX ADMIN — Medication 20 MILLIGRAM(S): at 05:47

## 2021-09-03 NOTE — PROGRESS NOTE ADULT - ASSESSMENT
Assessment  DMT2: 65y Male with DM T2 with hyperglycemia, A1C 7.1%, was on several oral meds at home, postop on basal bolus insulin, insulin requirement trending down, blood sugars trending down, had hypoglycemia this AM. Patient is eating meals, appears alert and comfortable, DC planning in progress.  CAD: on medications, no chest pain, stable, monitored.  HTN: Controlled,  on antihypertensive medications.  Obesity: No strict exercise routines, not on any weight loss program, neither on low calorie diet.        Cb Hill MD  Cell: [phone]  Office: [phone]     Assessment  DMT2: 65y Male with DM T2 with hyperglycemia, A1C 7.1%, was on several oral meds at home, postop on basal bolus insulin, insulin requirement trending down, blood sugars trending down, had hypoglycemia this AM. Patient is eating meals,  appears alert and comfortable, DC planning in progress.  CAD: on medications, no chest pain, stable, monitored.  HTN: Controlled,  on antihypertensive medications.  Obesity: No strict exercise routines, not on any weight loss program, neither on low calorie diet.        Cb Hill MD  Cell: [phone]  Office: [phone]

## 2021-09-03 NOTE — PROGRESS NOTE ADULT - SUBJECTIVE AND OBJECTIVE BOX
Chief complaint  Patient is a 65y old  Male who presents with a chief complaint of sob (02 Sep 2021 12:44)   Review of systems  Patient in bed, looks comfortable, had hypoglycemic episode this AM.    Labs and Fingersticks  CAPILLARY BLOOD GLUCOSE      POCT Blood Glucose.: 117 mg/dL (03 Sep 2021 11:46)  POCT Blood Glucose.: 83 mg/dL (03 Sep 2021 08:12)  POCT Blood Glucose.: 73 mg/dL (03 Sep 2021 07:23)  POCT Blood Glucose.: 151 mg/dL (02 Sep 2021 21:26)  POCT Blood Glucose.: 90 mg/dL (02 Sep 2021 17:50)  POCT Blood Glucose.: 82 mg/dL (02 Sep 2021 12:22)      Anion Gap, Serum: 12 (09-03 @ 07:47)  Anion Gap, Serum: 11 (09-02 @ 07:35)      Calcium, Total Serum: 9.0 (09-03 @ 07:47)  Calcium, Total Serum: 8.5 (09-02 @ 07:35)          09-03    140  |  105  |  26<H>  ----------------------------<  60<L>  3.8   |  23  |  1.20    Ca    9.0      03 Sep 2021 07:47                          8.0    7.93  )-----------( 431      ( 03 Sep 2021 07:47 )             25.7     Medications  MEDICATIONS  (STANDING):  aMIOdarone    Tablet 400 milliGRAM(s) Oral two times a day  aspirin enteric coated 325 milliGRAM(s) Oral daily  atorvastatin 80 milliGRAM(s) Oral at bedtime  buDESOnide    Inhalation Suspension 0.5 milliGRAM(s) Inhalation two times a day  dextrose 40% Gel 15 Gram(s) Oral once  dextrose 50% Injectable 50 milliLiter(s) IV Push every 15 minutes  dextrose 50% Injectable 25 milliLiter(s) IV Push every 15 minutes  enoxaparin Injectable 40 milliGRAM(s) SubCutaneous daily  finasteride 5 milliGRAM(s) Oral daily  furosemide    Tablet 40 milliGRAM(s) Oral two times a day  gabapentin 300 milliGRAM(s) Oral three times a day  gemfibrozil 600 milliGRAM(s) Oral two times a day  glucagon  Injectable 1 milliGRAM(s) IntraMuscular once  insulin glargine Injectable (LANTUS) 15 Unit(s) SubCutaneous at bedtime  insulin lispro (ADMELOG) corrective regimen sliding scale   SubCutaneous three times a day before meals  insulin lispro (ADMELOG) corrective regimen sliding scale   SubCutaneous at bedtime  insulin lispro Injectable (ADMELOG) 7 Unit(s) SubCutaneous three times a day before meals  metoprolol tartrate 25 milliGRAM(s) Oral two times a day  pantoprazole    Tablet 40 milliGRAM(s) Oral before breakfast  polyethylene glycol 3350 17 Gram(s) Oral daily  senna 2 Tablet(s) Oral at bedtime  spironolactone 25 milliGRAM(s) Oral every 12 hours  tamsulosin 0.4 milliGRAM(s) Oral at bedtime      Physical Exam  General: Patient comfortable in bed  Vital Signs Last 12 Hrs  T(F): 98.8 (09-03-21 @ 04:35), Max: 98.8 (09-03-21 @ 04:35)  HR: 68 (09-03-21 @ 04:35) (68 - 68)  BP: 119/68 (09-03-21 @ 04:35) (119/68 - 119/68)  BP(mean): --  RR: 18 (09-03-21 @ 04:35) (18 - 18)  SpO2: 95% (09-03-21 @ 04:35) (95% - 95%)  Neck: No palpable thyroid nodules.  CVS: S1S2, No murmurs  Respiratory: No wheezing, no crepitations  GI: Abdomen soft, bowel sounds positive  Musculoskeletal:  edema lower extremities.   Skin: No skin rashes, no ecchymosis    Diagnostics             Chief complaint  Patient is a 65y old  Male who presents with a chief complaint of sob (02 Sep 2021 12:44)   Review of systems  Patient in bed, looks comfortable, had hypoglycemic episode this AM.    Labs and Fingersticks  CAPILLARY BLOOD GLUCOSE      POCT Blood Glucose.: 117 mg/dL (03 Sep 2021 11:46)  POCT Blood Glucose.: 83 mg/dL (03 Sep 2021 08:12)  POCT Blood Glucose.: 73 mg/dL (03 Sep 2021 07:23)  POCT Blood Glucose.: 151 mg/dL (02 Sep 2021 21:26)  POCT Blood Glucose.: 90 mg/dL (02 Sep 2021 17:50)  POCT Blood Glucose.: 82 mg/dL (02 Sep 2021 12:22)      Anion Gap, Serum: 12 (09-03 @ 07:47)  Anion Gap, Serum: 11 (09-02 @ 07:35)      Calcium, Total Serum: 9.0 (09-03 @ 07:47)  Calcium, Total Serum: 8.5 (09-02 @ 07:35)          09-03    140  |  105  |  26<H>  ----------------------------<  60<L>  3.8   |  23  |  1.20    Ca    9.0      03 Sep 2021 07:47                          8.0    7.93  )-----------( 431      ( 03 Sep 2021 07:47 )             25.7     Medications  MEDICATIONS  (STANDING):  aMIOdarone    Tablet 400 milliGRAM(s) Oral two times a day  aspirin enteric coated 325 milliGRAM(s) Oral daily  atorvastatin 80 milliGRAM(s) Oral at bedtime  buDESOnide    Inhalation Suspension 0.5 milliGRAM(s) Inhalation two times a day  dextrose 40% Gel 15 Gram(s) Oral once  dextrose 50% Injectable 50 milliLiter(s) IV Push every 15 minutes  dextrose 50% Injectable 25 milliLiter(s) IV Push every 15 minutes  enoxaparin Injectable 40 milliGRAM(s) SubCutaneous daily  finasteride 5 milliGRAM(s) Oral daily  furosemide    Tablet 40 milliGRAM(s) Oral two times a day  gabapentin 300 milliGRAM(s) Oral three times a day  gemfibrozil 600 milliGRAM(s) Oral two times a day  glucagon  Injectable 1 milliGRAM(s) IntraMuscular once  insulin glargine Injectable (LANTUS) 15 Unit(s) SubCutaneous at bedtime  insulin lispro (ADMELOG) corrective regimen sliding scale   SubCutaneous three times a day before meals  insulin lispro (ADMELOG) corrective regimen sliding scale   SubCutaneous at bedtime  insulin lispro Injectable (ADMELOG) 7 Unit(s) SubCutaneous three times a day before meals  metoprolol tartrate 25 milliGRAM(s) Oral two times a day  pantoprazole    Tablet 40 milliGRAM(s) Oral before breakfast  polyethylene glycol 3350 17 Gram(s) Oral daily  senna 2 Tablet(s) Oral at bedtime  spironolactone 25 milliGRAM(s) Oral every 12 hours  tamsulosin 0.4 milliGRAM(s) Oral at bedtime      Physical Exam  General: Patient comfortable in bed  Vital Signs Last 12 Hrs  T(F): 98.8 (09-03-21 @ 04:35), Max: 98.8 (09-03-21 @ 04:35)  HR: 68 (09-03-21 @ 04:35) (68 - 68)  BP: 119/68 (09-03-21 @ 04:35) (119/68 - 119/68)  BP(mean): --  RR: 18 (09-03-21 @ 04:35) (18 - 18)  SpO2: 95% (09-03-21 @ 04:35) (95% - 95%)  Neck: No palpable thyroid nodules.  CVS: S1S2, No murmurs  Respiratory: No wheezing, no crepitations  GI: Abdomen soft, bowel sounds positive  Musculoskeletal:  edema lower extremities.   Skin: No skin rashes, no ecchymosis    Diagnostics

## 2021-09-03 NOTE — PROGRESS NOTE ADULT - SUBJECTIVE AND OBJECTIVE BOX
VITAL SIGNS    Telemetry: SR 60's   Vital Signs Last 24 Hrs  T(C): 37.1 (21 @ 04:35), Max: 37.1 (21 @ 04:35)  T(F): 98.8 (21 @ 04:35), Max: 98.8 (21 @ 04:35)  HR: 68 (21 @ 04:35) (65 - 71)  BP: 119/68 (21 @ 04:35) (107/58 - 119/68)  RR: 18 (21 @ 04:35) (18 - 18)  SpO2: 95% (21 @ 04:35) (95% - 95%)             @ 07:01  -   @ 07:00  --------------------------------------------------------  IN: 760 mL / OUT: 2200 mL / NET: -1440 mL     @ 07:01  -   @ 11:55  --------------------------------------------------------  IN: 360 mL / OUT: 700 mL / NET: -340 mL       Daily     Daily Weight in k.3 (03 Sep 2021 10:26)  Admit Wt: Drug Dosing Weight  Height (cm): 167.6 (27 Aug 2021 06:32)  Weight (kg): 86.2 (27 Aug 2021 06:32)  BMI (kg/m2): 30.7 (27 Aug 2021 06:32)  BSA (m2): 1.96 (27 Aug 2021 06:32)      CAPILLARY BLOOD GLUCOSE      POCT Blood Glucose.: 117 mg/dL (03 Sep 2021 11:46)  POCT Blood Glucose.: 83 mg/dL (03 Sep 2021 08:12)  POCT Blood Glucose.: 73 mg/dL (03 Sep 2021 07:23)  POCT Blood Glucose.: 151 mg/dL (02 Sep 2021 21:26)  POCT Blood Glucose.: 90 mg/dL (02 Sep 2021 17:50)  POCT Blood Glucose.: 82 mg/dL (02 Sep 2021 12:22)          MEDICATIONS  acetaminophen   Tablet .. 650 milliGRAM(s) Oral every 6 hours PRN  aMIOdarone    Tablet 400 milliGRAM(s) Oral two times a day  aspirin enteric coated 325 milliGRAM(s) Oral daily  atorvastatin 80 milliGRAM(s) Oral at bedtime  bisacodyl Suppository 10 milliGRAM(s) Rectal daily PRN  buDESOnide    Inhalation Suspension 0.5 milliGRAM(s) Inhalation two times a day  dextrose 40% Gel 15 Gram(s) Oral once  dextrose 50% Injectable 50 milliLiter(s) IV Push every 15 minutes  dextrose 50% Injectable 25 milliLiter(s) IV Push every 15 minutes  enoxaparin Injectable 40 milliGRAM(s) SubCutaneous daily  finasteride 5 milliGRAM(s) Oral daily  furosemide    Tablet 40 milliGRAM(s) Oral two times a day  gabapentin 300 milliGRAM(s) Oral three times a day  gemfibrozil 600 milliGRAM(s) Oral two times a day  glucagon  Injectable 1 milliGRAM(s) IntraMuscular once  insulin glargine Injectable (LANTUS) 15 Unit(s) SubCutaneous at bedtime  insulin lispro (ADMELOG) corrective regimen sliding scale   SubCutaneous three times a day before meals  insulin lispro (ADMELOG) corrective regimen sliding scale   SubCutaneous at bedtime  insulin lispro Injectable (ADMELOG) 7 Unit(s) SubCutaneous three times a day before meals  metoprolol tartrate 25 milliGRAM(s) Oral two times a day  oxyCODONE    IR 5 milliGRAM(s) Oral every 6 hours PRN  pantoprazole    Tablet 40 milliGRAM(s) Oral before breakfast  polyethylene glycol 3350 17 Gram(s) Oral daily  senna 2 Tablet(s) Oral at bedtime  sodium chloride 0.9% lock flush 10 milliLiter(s) IV Push every 1 hour PRN  spironolactone 25 milliGRAM(s) Oral every 12 hours  tamsulosin 0.4 milliGRAM(s) Oral at bedtime      >>> <<<  PHYSICAL EXAM  Subjective: NAD  Neurology: alert and oriented x 3, nonfocal, no gross deficits  CV : s1s2  Sternal Wound :  CDI , Stable  Lungs: CTA b/l  Abdomen: soft, NT,ND, (- )BM+flatus  :  voiding  Extremities: +2 edema b/l      LABS      140  |  105  |  26<H>  ----------------------------<  60<L>  3.8   |  23  |  1.20    Ca    9.0      03 Sep 2021 07:47                                   8.0    7.93  )-----------( 431      ( 03 Sep 2021 07:47 )             25.7                 PAST MEDICAL & SURGICAL HISTORY:  T2DM (type 2 diabetes mellitus)  c/b peripheral neuropathy    HTN (hypertension)    HLD (hyperlipidemia)    BPH (benign prostatic hyperplasia)    H/O shoulder surgery  right    H/O arthroscopy of knee    S/P arthroscopy of right knee

## 2021-09-03 NOTE — PROGRESS NOTE ADULT - ASSESSMENT
65M, (denies implanted devices) with significant PMHx of T2DM (last HgbA1c 8.1, managed by PCP, complicated by peripheral neuropathy), HLD, HTN, BPH presented for RHC/LHC. Patient c/o chest discomfort with exertion that has been becoming progressively worse and resolves with rest associated with SOB.  Patient recently went to [facility] ER on 7/29 and patient states results were normal. Patient seen and evaluated by Dr. Celestin and presented for further evaluation and cardiac Cath.  Cath finding revealed: proximal left main 50 %, proximal LAD 95 % stenosis, proximal circumflex: 95 % and proximal RCA: 95 % stenosis. CTS consult called to evaluate patient for cardiac surgery with Dr. Deep Valerio.   8/22 P2Y12 80. Continue with BBlocker, statin asa. CABG tentatively scheduled in am pending repeat p2y12 in am 0400  8/23 CABG postponed P2Y12> 92 this am  Endo consulted elevated BS, Start statin (lopid at home)   ASA betablocker CAD Recheck P2Y12 am  8/24 P2Y12 @ 5pm-->   T&S to be sent at 5pm as well.   8/25 VVS; Hyperglycemia --> BFG > 200.  Transferred to SDU for Insulin gtt.  NPO after midnight  8/26 Off isulin gtt, resumed this  ирина, OR tomorrow.   8/27/21: s/p CABG x 3  Post op Course:   8/30/21: Orthostatic in ICU, given volume resuscitation, transferred to floor, VSS, weaning to room air, ambulation, IS. ASA/BB/STATIN    8/31 Asymptomatic hypotension --> BP 88/60, Brief rate controlled afib --> increased Lopressor to 25 mg PO q12 and Lasix 20 x 1 today for edema.   9/1 VVS; Continue on modified Amio load 400 mg PO BID.  Now NSR; Continue with current medication regimen.  Insulin teaching using teach back method.   9/2 VSS - lasix & aldactone started this am as per Dr. Valerio.  oob.    9/3 Continue with bronchodilator and chest pt for right lobe atelectasis, Lasix and aldactone increased for8kg weight gain, Continue with insulin teaching and diabetic education.  Disposition: Home Saturday once medically cleared.

## 2021-09-03 NOTE — PROGRESS NOTE ADULT - PROBLEM SELECTOR PLAN 2
Continue with  mg PO Daily.   Continue with Lopressor 25 mg PO BID.   Continue with Lipitor 80 mg PO HS    Increase activity as tolerated.   Continue with Lasix 40 mg PO  bid and Aldactone 25 mg PO bid   Encourage Chest PT / Pulmonary toileting and Incentive spirometry every 1 hour x 10 while awake.   Continue with PUD and DVT prophylaxis.   Shower on POD #5.   D/C plan home PT once medically cleared   Plan of care discussed with attending

## 2021-09-03 NOTE — PROGRESS NOTE ADULT - PROBLEM SELECTOR PLAN 1
Will decrease Lantus to 15 units at bed time.  Will decrease Admelog to 7 units before each meal and continue Admelog correction scale coverage. Will continue monitoring FS and FU.  Insulin requirement is trending down, can likely DC patient on PO meds.  Suggest DC on Janumet 50/1000 BID (discontinue prior oral meds), endo FU 4 weeks.  Counseled for compliance with consistent low carb diet and exercise as tolerated outpatient.  S/P RD eval for additional recommendations.

## 2021-09-04 ENCOUNTER — TRANSCRIPTION ENCOUNTER (OUTPATIENT)
Age: 66
End: 2021-09-04

## 2021-09-04 LAB
ANION GAP SERPL CALC-SCNC: 13 MMOL/L — SIGNIFICANT CHANGE UP (ref 5–17)
BASOPHILS # BLD AUTO: 0.06 K/UL — SIGNIFICANT CHANGE UP (ref 0–0.2)
BASOPHILS NFR BLD AUTO: 0.7 % — SIGNIFICANT CHANGE UP (ref 0–2)
BUN SERPL-MCNC: 28 MG/DL — HIGH (ref 7–23)
CALCIUM SERPL-MCNC: 8.8 MG/DL — SIGNIFICANT CHANGE UP (ref 8.4–10.5)
CHLORIDE SERPL-SCNC: 105 MMOL/L — SIGNIFICANT CHANGE UP (ref 96–108)
CO2 SERPL-SCNC: 20 MMOL/L — LOW (ref 22–31)
CREAT SERPL-MCNC: 1.24 MG/DL — SIGNIFICANT CHANGE UP (ref 0.5–1.3)
EOSINOPHIL # BLD AUTO: 0.28 K/UL — SIGNIFICANT CHANGE UP (ref 0–0.5)
EOSINOPHIL NFR BLD AUTO: 3.2 % — SIGNIFICANT CHANGE UP (ref 0–6)
GLUCOSE BLDC GLUCOMTR-MCNC: 127 MG/DL — HIGH (ref 70–99)
GLUCOSE BLDC GLUCOMTR-MCNC: 162 MG/DL — HIGH (ref 70–99)
GLUCOSE BLDC GLUCOMTR-MCNC: 232 MG/DL — HIGH (ref 70–99)
GLUCOSE BLDC GLUCOMTR-MCNC: 232 MG/DL — HIGH (ref 70–99)
GLUCOSE SERPL-MCNC: 152 MG/DL — HIGH (ref 70–99)
HCT VFR BLD CALC: 25.5 % — LOW (ref 39–50)
HGB BLD-MCNC: 8 G/DL — LOW (ref 13–17)
IMM GRANULOCYTES NFR BLD AUTO: 1.4 % — SIGNIFICANT CHANGE UP (ref 0–1.5)
LYMPHOCYTES # BLD AUTO: 0.86 K/UL — LOW (ref 1–3.3)
LYMPHOCYTES # BLD AUTO: 9.8 % — LOW (ref 13–44)
MCHC RBC-ENTMCNC: 30.1 PG — SIGNIFICANT CHANGE UP (ref 27–34)
MCHC RBC-ENTMCNC: 31.4 GM/DL — LOW (ref 32–36)
MCV RBC AUTO: 95.9 FL — SIGNIFICANT CHANGE UP (ref 80–100)
MONOCYTES # BLD AUTO: 1.17 K/UL — HIGH (ref 0–0.9)
MONOCYTES NFR BLD AUTO: 13.3 % — SIGNIFICANT CHANGE UP (ref 2–14)
NEUTROPHILS # BLD AUTO: 6.32 K/UL — SIGNIFICANT CHANGE UP (ref 1.8–7.4)
NEUTROPHILS NFR BLD AUTO: 71.6 % — SIGNIFICANT CHANGE UP (ref 43–77)
NRBC # BLD: 0 /100 WBCS — SIGNIFICANT CHANGE UP (ref 0–0)
PLATELET # BLD AUTO: 463 K/UL — HIGH (ref 150–400)
POTASSIUM SERPL-MCNC: 4.2 MMOL/L — SIGNIFICANT CHANGE UP (ref 3.5–5.3)
POTASSIUM SERPL-SCNC: 4.2 MMOL/L — SIGNIFICANT CHANGE UP (ref 3.5–5.3)
RBC # BLD: 2.66 M/UL — LOW (ref 4.2–5.8)
RBC # FLD: 14.6 % — HIGH (ref 10.3–14.5)
SODIUM SERPL-SCNC: 138 MMOL/L — SIGNIFICANT CHANGE UP (ref 135–145)
WBC # BLD: 8.81 K/UL — SIGNIFICANT CHANGE UP (ref 3.8–10.5)
WBC # FLD AUTO: 8.81 K/UL — SIGNIFICANT CHANGE UP (ref 3.8–10.5)

## 2021-09-04 RX ORDER — INSULIN GLARGINE 100 [IU]/ML
18 INJECTION, SOLUTION SUBCUTANEOUS AT BEDTIME
Refills: 0 | Status: DISCONTINUED | OUTPATIENT
Start: 2021-09-04 | End: 2021-09-06

## 2021-09-04 RX ORDER — INSULIN LISPRO 100/ML
8 VIAL (ML) SUBCUTANEOUS
Refills: 0 | Status: DISCONTINUED | OUTPATIENT
Start: 2021-09-04 | End: 2021-09-06

## 2021-09-04 RX ADMIN — GABAPENTIN 300 MILLIGRAM(S): 400 CAPSULE ORAL at 13:19

## 2021-09-04 RX ADMIN — Medication 325 MILLIGRAM(S): at 11:32

## 2021-09-04 RX ADMIN — Medication 0.5 MILLIGRAM(S): at 17:57

## 2021-09-04 RX ADMIN — PANTOPRAZOLE SODIUM 40 MILLIGRAM(S): 20 TABLET, DELAYED RELEASE ORAL at 05:55

## 2021-09-04 RX ADMIN — OXYCODONE HYDROCHLORIDE 5 MILLIGRAM(S): 5 TABLET ORAL at 14:00

## 2021-09-04 RX ADMIN — Medication 40 MILLIGRAM(S): at 17:57

## 2021-09-04 RX ADMIN — GABAPENTIN 300 MILLIGRAM(S): 400 CAPSULE ORAL at 21:41

## 2021-09-04 RX ADMIN — OXYCODONE HYDROCHLORIDE 5 MILLIGRAM(S): 5 TABLET ORAL at 13:19

## 2021-09-04 RX ADMIN — Medication 8 UNIT(S): at 16:59

## 2021-09-04 RX ADMIN — SPIRONOLACTONE 25 MILLIGRAM(S): 25 TABLET, FILM COATED ORAL at 05:54

## 2021-09-04 RX ADMIN — SPIRONOLACTONE 25 MILLIGRAM(S): 25 TABLET, FILM COATED ORAL at 17:57

## 2021-09-04 RX ADMIN — POLYETHYLENE GLYCOL 3350 17 GRAM(S): 17 POWDER, FOR SOLUTION ORAL at 11:33

## 2021-09-04 RX ADMIN — Medication 8 UNIT(S): at 12:18

## 2021-09-04 RX ADMIN — ATORVASTATIN CALCIUM 80 MILLIGRAM(S): 80 TABLET, FILM COATED ORAL at 21:40

## 2021-09-04 RX ADMIN — OXYCODONE HYDROCHLORIDE 5 MILLIGRAM(S): 5 TABLET ORAL at 22:30

## 2021-09-04 RX ADMIN — OXYCODONE HYDROCHLORIDE 5 MILLIGRAM(S): 5 TABLET ORAL at 21:45

## 2021-09-04 RX ADMIN — Medication 0.5 MILLIGRAM(S): at 05:56

## 2021-09-04 RX ADMIN — Medication 600 MILLIGRAM(S): at 17:57

## 2021-09-04 RX ADMIN — ENOXAPARIN SODIUM 40 MILLIGRAM(S): 100 INJECTION SUBCUTANEOUS at 11:33

## 2021-09-04 RX ADMIN — Medication 25 MILLIGRAM(S): at 05:55

## 2021-09-04 RX ADMIN — SENNA PLUS 2 TABLET(S): 8.6 TABLET ORAL at 21:40

## 2021-09-04 RX ADMIN — INSULIN GLARGINE 18 UNIT(S): 100 INJECTION, SOLUTION SUBCUTANEOUS at 21:41

## 2021-09-04 RX ADMIN — GABAPENTIN 300 MILLIGRAM(S): 400 CAPSULE ORAL at 05:55

## 2021-09-04 RX ADMIN — AMIODARONE HYDROCHLORIDE 400 MILLIGRAM(S): 400 TABLET ORAL at 17:57

## 2021-09-04 RX ADMIN — Medication 25 MILLIGRAM(S): at 17:57

## 2021-09-04 RX ADMIN — Medication 7 UNIT(S): at 08:13

## 2021-09-04 RX ADMIN — Medication 600 MILLIGRAM(S): at 05:55

## 2021-09-04 RX ADMIN — AMIODARONE HYDROCHLORIDE 400 MILLIGRAM(S): 400 TABLET ORAL at 05:54

## 2021-09-04 RX ADMIN — Medication 40 MILLIGRAM(S): at 05:57

## 2021-09-04 RX ADMIN — FINASTERIDE 5 MILLIGRAM(S): 5 TABLET, FILM COATED ORAL at 11:32

## 2021-09-04 RX ADMIN — Medication 1: at 08:13

## 2021-09-04 RX ADMIN — TAMSULOSIN HYDROCHLORIDE 0.4 MILLIGRAM(S): 0.4 CAPSULE ORAL at 21:40

## 2021-09-04 RX ADMIN — Medication 2: at 16:59

## 2021-09-04 NOTE — DISCHARGE NOTE PROVIDER - NSDCFUADDAPPT_GEN_ALL_CORE_FT
1. Please schedule an appointment with Dr. Valerio in 7-10 days, please call the [facility] office on Tuesday 9/7/21 to schedule an appointment at [phone].   2. Please schedule an appointment with your PCP in 1-2 weeks, call the office to schedule an appointment.  3. Please schedule an appointment with your Cardiologist Dr. Celestin in 2 weeks, please call the office to schedule an appointment.   4. Please schedule an appointment with your out patient Endocrinologist in 2-3 weeks, please call the office to schedule an appointment.

## 2021-09-04 NOTE — PROGRESS NOTE ADULT - SUBJECTIVE AND OBJECTIVE BOX
VITAL SIGNS    Telemetry:  SR 60  Vital Signs Last 24 Hrs  T(C): 36.7 (21 @ 05:00), Max: 36.8 (21 @ 13:57)  T(F): 98.1 (21 @ 05:00), Max: 98.2 (21 @ 13:57)  HR: 66 (21 @ 05:00) (63 - 76)  BP: 102/65 (21 @ 05:00) (97/51 - 102/65)  RR: 18 (21 @ 05:00) (18 - 18)  SpO2: 99% (21 @ 05:00) (96% - 99%)             @ 07:01  -   @ 07:00  --------------------------------------------------------  IN: 1480 mL / OUT: 2050 mL / NET: -570 mL     @ 07:01  -   @ 12:28  --------------------------------------------------------  IN: 360 mL / OUT: 400 mL / NET: -40 mL       Daily     Daily Weight in k.4 (04 Sep 2021 10:22)  Admit Wt: Drug Dosing Weight  Height (cm): 167.6 (27 Aug 2021 06:32)  Weight (kg): 86.2 (27 Aug 2021 06:32)  BMI (kg/m2): 30.7 (27 Aug 2021 06:32)  BSA (m2): 1.96 (27 Aug 2021 06:32)      CAPILLARY BLOOD GLUCOSE      POCT Blood Glucose.: 127 mg/dL (04 Sep 2021 11:49)  POCT Blood Glucose.: 162 mg/dL (04 Sep 2021 08:01)  POCT Blood Glucose.: 213 mg/dL (03 Sep 2021 21:51)  POCT Blood Glucose.: 190 mg/dL (03 Sep 2021 16:45)          MEDICATIONS  acetaminophen   Tablet .. 650 milliGRAM(s) Oral every 6 hours PRN  aMIOdarone    Tablet 400 milliGRAM(s) Oral two times a day  aspirin enteric coated 325 milliGRAM(s) Oral daily  atorvastatin 80 milliGRAM(s) Oral at bedtime  bisacodyl Suppository 10 milliGRAM(s) Rectal daily PRN  buDESOnide    Inhalation Suspension 0.5 milliGRAM(s) Inhalation two times a day  dextrose 40% Gel 15 Gram(s) Oral once  dextrose 50% Injectable 50 milliLiter(s) IV Push every 15 minutes  dextrose 50% Injectable 25 milliLiter(s) IV Push every 15 minutes  enoxaparin Injectable 40 milliGRAM(s) SubCutaneous daily  finasteride 5 milliGRAM(s) Oral daily  furosemide    Tablet 40 milliGRAM(s) Oral two times a day  gabapentin 300 milliGRAM(s) Oral three times a day  gemfibrozil 600 milliGRAM(s) Oral two times a day  glucagon  Injectable 1 milliGRAM(s) IntraMuscular once  insulin glargine Injectable (LANTUS) 18 Unit(s) SubCutaneous at bedtime  insulin lispro (ADMELOG) corrective regimen sliding scale   SubCutaneous three times a day before meals  insulin lispro (ADMELOG) corrective regimen sliding scale   SubCutaneous at bedtime  insulin lispro Injectable (ADMELOG) 8 Unit(s) SubCutaneous three times a day before meals  metoprolol tartrate 25 milliGRAM(s) Oral two times a day  oxyCODONE    IR 5 milliGRAM(s) Oral every 6 hours PRN  pantoprazole    Tablet 40 milliGRAM(s) Oral before breakfast  polyethylene glycol 3350 17 Gram(s) Oral daily  senna 2 Tablet(s) Oral at bedtime  sodium chloride 0.9% lock flush 10 milliLiter(s) IV Push every 1 hour PRN  spironolactone 25 milliGRAM(s) Oral every 12 hours  tamsulosin 0.4 milliGRAM(s) Oral at bedtime      >>> <<<  PHYSICAL EXAM  Subjective: NAD, no complaints offered  Neurology: alert and oriented x 3, nonfocal, no gross deficits  CV : s1s2  Sternal Wound :  CDI , Stable  Lungs: CTA b/l  Abdomen: soft, NT,ND, (+ )BM  :  voiding  Extremities: -c/c/e left svg site c/d/i    LABS      138  |  105  |  28<H>  ----------------------------<  152<H>  4.2   |  20<L>  |  1.24    Ca    8.8      04 Sep 2021 06:32                                   8.0    8.81  )-----------( 463      ( 04 Sep 2021 06:32 )             25.5                 PAST MEDICAL & SURGICAL HISTORY:  T2DM (type 2 diabetes mellitus)  c/b peripheral neuropathy    HTN (hypertension)    HLD (hyperlipidemia)    BPH (benign prostatic hyperplasia)    H/O shoulder surgery  right    H/O arthroscopy of knee    S/P arthroscopy of right knee

## 2021-09-04 NOTE — DISCHARGE NOTE PROVIDER - NSDCMRMEDTOKEN_GEN_ALL_CORE_FT
Aspirin Enteric Coated 81 mg oral delayed release tablet: 1 tab(s) orally once a day  Drisdol 1.25 mg (50,000 intl units) oral capsule: 1 cap(s) orally once a month  enalapril 10 mg oral tablet: 1 tab(s) orally once a day  Flomax 0.4 mg oral capsule: 1 cap(s) orally once a day  gemfibrozil 600 mg oral tablet: 1 tab(s) orally 2 times a day  glipiZIDE 10 mg oral tablet, extended release: 1 tab(s) orally 2 times a day  Januvia 100 mg oral tablet: 1 tab(s) orally once a day  Jardiance 10 mg oral tablet: 1 tab(s) orally once a day (in the morning)  metFORMIN 1000 mg oral tablet: 1 tab(s) orally 2 times a day  Neurontin 300 mg oral capsule: 1 cap(s) orally 3 times a day  Proscar 5 mg oral tablet: 1 tab(s) orally once a day  Viagra 100 mg oral tablet: 1 tab(s) orally once a day   acetaminophen 325 mg oral tablet: 2 tab(s) orally every 6 hours, As needed, Temp greater or equal to 38C (100.4F), Mild Pain (1 - 3)  amiodarone 200 mg oral tablet: 1 tab(s) orally once a day  aspirin 325 mg oral delayed release tablet: 1 tab(s) orally once a day  atorvastatin 80 mg oral tablet: 1 tab(s) orally once a day (at bedtime)  Drisdol 1.25 mg (50,000 intl units) oral capsule: 1 cap(s) orally once a month  Flomax 0.4 mg oral capsule: 1 cap(s) orally once a day  furosemide 40 mg oral tablet: 1 tab(s) orally once a day   gemfibrozil 600 mg oral tablet: 1 tab(s) orally 2 times a day  glimepiride 2 mg oral tablet: 1 tab(s) orally once a day   Janumet 50 mg-1000 mg oral tablet: 1 tab(s) orally 2 times a day   metoprolol tartrate 25 mg oral tablet: 1 tab(s) orally 2 times a day  Neurontin 300 mg oral capsule: 1 cap(s) orally 3 times a day  pantoprazole 40 mg oral delayed release tablet: 1 tab(s) orally once a day (before a meal)  polyethylene glycol 3350 oral powder for reconstitution: 17 gram(s) orally once a day, As Needed -for constipation   Proscar 5 mg oral tablet: 1 tab(s) orally once a day  senna oral tablet: 2 tab(s) orally once a day (at bedtime), As Needed -for constipation   spironolactone 25 mg oral tablet: 1 tab(s) orally once a day

## 2021-09-04 NOTE — DISCHARGE NOTE PROVIDER - CARE PROVIDER_API CALL
Deep Valerio)  Surgery; Surgical Critical Care; Thoracic and Cardiac Surgery  [address] [place] [address]  Phone: [phone]  Fax: [phone]  Follow Up Time: 1 week    Oli Celestin  INTERNAL MEDICINE  [address] [facility], [address]  [place] [address]  Phone: [phone]  Fax: [phone]  Established Patient  Follow Up Time: 2 weeks    Cb Hill)  EndocrinologyMetabDiabetes; Internal Medicine  [address] [place] [address]  Phone: [phone]  Fax: [phone]  Follow Up Time: 2 weeks   Deep Valerio)  Surgery; Surgical Critical Care; Thoracic and Cardiac Surgery  [address] [place] [address]  Phone: [phone]  Fax: [phone]  Follow Up Time: 1 week    lOi Celestin  INTERNAL MEDICINE  [address] [facility], [address]  [place] [address]  Phone: [phone]  Fax: [phone]  Established Patient  Follow Up Time: 2 weeks    Cb Hill)  EndocrinologyMetabDiabetes; Internal Medicine  [address] [place] [address]  Phone: [phone]  Fax: [phone]  Follow Up Time: 2 weeks    Dee Dee Baxter  Internal Medicine   [address] [facility] [address],   [place], [address]  Phone: [phone]  Fax: (   )    -  Established Patient  Follow Up Time: 2 weeks

## 2021-09-04 NOTE — PROGRESS NOTE ADULT - ASSESSMENT
65M, (denies implanted devices) with significant PMHx of T2DM (last HgbA1c 8.1, managed by PCP, complicated by peripheral neuropathy), HLD, HTN, BPH presented for RHC/LHC. Patient c/o chest discomfort with exertion that has been becoming progressively worse and resolves with rest associated with SOB.  Patient recently went to [facility] ER on 7/29 and patient states results were normal. Patient seen and evaluated by Dr. Celestin and presented for further evaluation and cardiac Cath.  Cath finding revealed: proximal left main 50 %, proximal LAD 95 % stenosis, proximal circumflex: 95 % and proximal RCA: 95 % stenosis. CTS consult called to evaluate patient for cardiac surgery with Dr. Deep Valerio.   8/22 P2Y12 80. Continue with BBlocker, statin asa. CABG tentatively scheduled in am pending repeat p2y12 in am 0400  8/23 CABG postponed P2Y12> 92 this am  Endo consulted elevated BS, Start statin (lopid at home)   ASA betablocker CAD Recheck P2Y12 am  8/24 P2Y12 @ 5pm-->   T&S to be sent at 5pm as well.   8/25 VVS; Hyperglycemia --> BFG > 200.  Transferred to SDU for Insulin gtt.  NPO after midnight  8/26 Off isulin gtt, resumed this  ирина, OR tomorrow.   8/27/21: s/p CABG x 3  Post op Course:   8/30/21: Orthostatic in ICU, given volume resuscitation, transferred to floor, VSS, weaning to room air, ambulation, IS. ASA/BB/STATIN    8/31 Asymptomatic hypotension --> BP 88/60, Brief rate controlled afib --> increased Lopressor to 25 mg PO q12 and Lasix 20 x 1 today for edema.   9/1 VVS; Continue on modified Amio load 400 mg PO BID.  Now NSR; Continue with current medication regimen.  Insulin teaching using teach back method.   9/2 VSS - lasix & aldactone started this am as per Dr. Valerio.  oob.    9/3 Continue with bronchodilator and chest pt for right lobe atelectasis, Lasix and aldactone increased for 8kg weight gain, Continue with insulin teaching and diabetic education.  9/2 Basal bolus insulin requirements minimal. D/C home on oral antidiabetic agents. Continue with diuresis as he is 8Kg above preop weight. Pt medically cleared for discharge home today however requesting discharge home Monday.

## 2021-09-04 NOTE — DISCHARGE NOTE PROVIDER - NSDCCPCAREPLAN_GEN_ALL_CORE_FT
PRINCIPAL DISCHARGE DIAGNOSIS  Diagnosis: S/P CABG x 3  Assessment and Plan of Treatment:   1. Daily Shower  2. Weight yourself daily and notify any weight gain greater than 2-3 pounds in 24 hours.  3. Regular DASH / Diabetic diet - low fat, low cholesterol, no added salt.  4. Cleanse Midsternal incision and leg incision daily while showering with warm water and mild soap, pat dry and maintain open to air.   5. Follow Cardiac Surgery Do's and Don'ts discharge instructions.   6. No driving until cleared by MD.   7. No heavy lifting nothing greater than 5 pounds until cleared by MD.   8. Call / Notify MD any fever greater than 101.0  9. Increase Activity as tolerated.   10. Continue on current medication regimen as instructed on your discharge paperwork.      SECONDARY DISCHARGE DIAGNOSES  Diagnosis: HLD (hyperlipidemia)  Assessment and Plan of Treatment:     Diagnosis: DM (diabetes mellitus)  Assessment and Plan of Treatment:

## 2021-09-04 NOTE — PROGRESS NOTE ADULT - ASSESSMENT
Assessment  DMT2: 65y Male with DM T2 with hyperglycemia, A1C 7.1%, was on several oral meds at home, postop on basal bolus insulin, insulin requirement trending down, blood sugars improving. Patient is eating meals,  appears alert and comfortable, DC planning in progress.  CAD: on medications, no chest pain, stable, monitored.  HTN: Controlled,  on antihypertensive medications.  Obesity: No strict exercise routines, not on any weight loss program, neither on low calorie diet.        Cb Hill MD  Cell: [phone]  Office: [phone]

## 2021-09-04 NOTE — PROGRESS NOTE ADULT - PROBLEM SELECTOR PLAN 1
Will continue current insulin regimen for now. Will continue monitoring  blood sugars, will Follow up.  Suggest DC on Janumet 50/1000 BID (discontinue prior oral meds), endo FU 4 weeks.  Counseled for compliance with consistent low carb diet and exercise as tolerated outpatient.  S/P JUANCARLOS espino for additional recommendations.

## 2021-09-04 NOTE — PROGRESS NOTE ADULT - SUBJECTIVE AND OBJECTIVE BOX
Chief complaint    Patient is a 65y old  Male who presents with a chief complaint of CAD (04 Sep 2021 12:25)   Review of systems  Patient in bed, appears comfortable.    Labs and Fingersticks  CAPILLARY BLOOD GLUCOSE      POCT Blood Glucose.: 232 mg/dL (04 Sep 2021 16:50)  POCT Blood Glucose.: 127 mg/dL (04 Sep 2021 11:49)  POCT Blood Glucose.: 162 mg/dL (04 Sep 2021 08:01)  POCT Blood Glucose.: 213 mg/dL (03 Sep 2021 21:51)      Anion Gap, Serum: 13 (09-04 @ 06:32)  Anion Gap, Serum: 12 (09-03 @ 07:47)      Calcium, Total Serum: 8.8 (09-04 @ 06:32)  Calcium, Total Serum: 9.0 (09-03 @ 07:47)          09-04    138  |  105  |  28<H>  ----------------------------<  152<H>  4.2   |  20<L>  |  1.24    Ca    8.8      04 Sep 2021 06:32                          8.0    8.81  )-----------( 463      ( 04 Sep 2021 06:32 )             25.5     Medications  MEDICATIONS  (STANDING):  aMIOdarone    Tablet 400 milliGRAM(s) Oral two times a day  aspirin enteric coated 325 milliGRAM(s) Oral daily  atorvastatin 80 milliGRAM(s) Oral at bedtime  buDESOnide    Inhalation Suspension 0.5 milliGRAM(s) Inhalation two times a day  dextrose 40% Gel 15 Gram(s) Oral once  dextrose 50% Injectable 50 milliLiter(s) IV Push every 15 minutes  dextrose 50% Injectable 25 milliLiter(s) IV Push every 15 minutes  enoxaparin Injectable 40 milliGRAM(s) SubCutaneous daily  finasteride 5 milliGRAM(s) Oral daily  furosemide    Tablet 40 milliGRAM(s) Oral two times a day  gabapentin 300 milliGRAM(s) Oral three times a day  gemfibrozil 600 milliGRAM(s) Oral two times a day  glucagon  Injectable 1 milliGRAM(s) IntraMuscular once  insulin glargine Injectable (LANTUS) 18 Unit(s) SubCutaneous at bedtime  insulin lispro (ADMELOG) corrective regimen sliding scale   SubCutaneous three times a day before meals  insulin lispro (ADMELOG) corrective regimen sliding scale   SubCutaneous at bedtime  insulin lispro Injectable (ADMELOG) 8 Unit(s) SubCutaneous three times a day before meals  metoprolol tartrate 25 milliGRAM(s) Oral two times a day  pantoprazole    Tablet 40 milliGRAM(s) Oral before breakfast  polyethylene glycol 3350 17 Gram(s) Oral daily  senna 2 Tablet(s) Oral at bedtime  spironolactone 25 milliGRAM(s) Oral every 12 hours  tamsulosin 0.4 milliGRAM(s) Oral at bedtime      Physical Exam  General: Patient comfortable in bed  Vital Signs Last 12 Hrs  T(F): 98.1 (09-04-21 @ 14:35), Max: 98.1 (09-04-21 @ 05:00)  HR: 69 (09-04-21 @ 14:35) (66 - 69)  BP: 99/50 (09-04-21 @ 14:35) (99/50 - 102/65)  BP(mean): --  RR: 18 (09-04-21 @ 14:35) (18 - 18)  SpO2: 95% (09-04-21 @ 14:35) (95% - 99%)  Neck: No palpable thyroid nodules.  CVS: S1S2, No murmurs  Respiratory: No wheezing, no crepitations  GI: Abdomen soft, bowel sounds positive  Musculoskeletal:  edema lower extremities.     Diagnostics

## 2021-09-04 NOTE — DISCHARGE NOTE PROVIDER - HOSPITAL COURSE
65M, (denies implanted devices) with significant PMHx of T2DM (last HgbA1c 8.1, managed by PCP, complicated by peripheral neuropathy), HLD, HTN, BPH presented for RHC/LHC. Patient c/o chest discomfort with exertion that has been becoming progressively worse and resolves with rest associated with SOB.  Patient recently went to [facility] ER on 7/29 and patient states results were normal. Patient seen and evaluated by Dr. Celestin and presented for further evaluation and cardiac Cath.  Cath finding revealed: proximal left main 50 %, proximal LAD 95 % stenosis, proximal circumflex: 95 % and proximal RCA: 95 % stenosis. CTS consult called to evaluate patient for cardiac surgery with Dr. Deep Valerio.   8/22 P2Y12 80. Continue with BBlocker, statin asa. CABG tentatively scheduled in am pending repeat p2y12 in am 0400  8/23 CABG postponed P2Y12> 92 this am  Endo consulted elevated BS, Start statin (lopid at home)   ASA betablocker CAD Recheck P2Y12 am  8/24 P2Y12 @ 5pm-->   T&S to be sent at 5pm as well.   8/25 VVS; Hyperglycemia --> BFG > 200.  Transferred to SDU for Insulin gtt.  NPO after midnight  8/26 Off isulin gtt, resumed this  ирина, OR tomorrow.   8/27/21: s/p CABG x 3  Post op Course:   8/30/21: Orthostatic in ICU, given volume resuscitation, transferred to floor, VSS, weaning to room air, ambulation, IS. ASA/BB/STATIN    8/31 Asymptomatic hypotension --> BP 88/60, Brief rate controlled afib --> increased Lopressor to 25 mg PO q12 and Lasix 20 x 1 today for edema.   9/1 VVS; Continue on modified Amio load 400 mg PO BID.  Now NSR; Continue with current medication regimen.  Insulin teaching using teach back method.   9/2 VSS - lasix & aldactone started this am as per Dr. Valerio.  oob.    9/3 Continue with bronchodilator and chest pt for right lobe atelectasis, Lasix and aldactone increased for8kg weight gain, Continue with insulin teaching and diabetic education.  9/2 Basal bolus insulin requirements minimal. D/C home on oral antidiabetic agents. Continue with diuresis as he is 8Kg above preop weight.  Disposition: Home Saturday 65M, (denies implanted devices) with significant PMHx of T2DM (last HgbA1c 8.1, managed by PCP, complicated by peripheral neuropathy), HLD, HTN, BPH presented for RHC/LHC. Patient c/o chest discomfort with exertion that has been becoming progressively worse and resolves with rest associated with SOB.  Patient recently went to [facility] ER on 7/29 and patient states results were normal. Patient seen and evaluated by Dr. Celestin and presented for further evaluation and cardiac Cath.  Cath finding revealed: proximal left main 50 %, proximal LAD 95 % stenosis, proximal circumflex: 95 % and proximal RCA: 95 % stenosis. CTS consult called to evaluate patient for cardiac surgery with Dr. Deep Valerio.   8/22 P2Y12 80. Continue with B-Blocker, statin asa. CABG tentatively scheduled in am pending repeat p2y12 in am 0400  8/23 CABG postponed P2Y12> 92 this am. Endo consulted elevated BS, Start statin (lopid at home). ASA beta-blocker CAD Recheck P2Y12 am  8/24 P2Y12 @ 5pm-->   T&S to be sent at 5pm as well.   8/25 VVS; Hyperglycemia --> BFG > 200.  Transferred to SDU for Insulin gtt.  NPO after midnight  8/26 Off insulin gtt, resumed this ирина, OR tomorrow.   8/27/21: s/p CABG x 3  Post op Course:   8/30/21: Orthostatic in ICU, given volume resuscitation, transferred to floor, VSS, weaning to room air, ambulation, IS. ASA/BB/STATIN    8/31 Asymptomatic hypotension --> BP 88/60, Brief rate controlled afib --> increased Lopressor to 25 mg PO q12 and Lasix 20 x 1 today for edema.   9/1 VVS; Continue on modified Amio load 400 mg PO BID.  Now NSR; Continue with current medication regimen.  Insulin teaching using teach back method.   9/2 VSS - Lasix & aldactone started this am as per Dr. Valerio.  oob.    9/3 Continue with bronchodilator and chest pt for right lobe atelectasis, Lasix and Aldactone increased for 8kg weight gain, Continue with insulin teaching and diabetic education.  9/2 Basal bolus insulin requirements minimal. D/C home on oral antidiabetic agents. Continue with diuresis as he is 8Kg above preop weight. Pt medically cleared for discharge home today however requesting discharge home Monday.  9/3 VSS continue with present medication.  9/6 VVS; Continue with current medication regimen. Patient medically cleared for discharge home today per covering attending Dr. Hines and Dr. Estrada.

## 2021-09-04 NOTE — DISCHARGE NOTE PROVIDER - NSDCPNSUBOBJ_GEN_ALL_CORE
VITAL SIGNS    Subjective: Denies CP, palpitation, SOB, HOLLEY, HA, dizziness, N/V/D, fever or chills.  No acute event noted overnight.     Telemetry: NSR 55-80     Vital Signs Last 24 Hrs  T(C): 36.9 (21 @ 04:35), Max: 36.9 (21 @ 04:35)  T(F): 98.4 (21 @ 04:35), Max: 98.4 (21 @ 04:35)  HR: 62 (21 @ 04:35) (59 - 70)  BP: 109/63 (21 @ 04:35) (100/62 - 109/63)  RR: 18 (21 @ 04:35) (18 - 18)  SpO2: 96% (21 @ 04:35) (96% - 98%)            07:01  -   @ 07:00  --------------------------------------------------------  IN: 940 mL / OUT: 2050 mL / NET: -1110 mL     07:01  -   @ 09:36  --------------------------------------------------------  IN: 0 mL / OUT: 200 mL / NET: -200 mL    Daily     Daily Weight in k.9 (06 Sep 2021 08:00)    CAPILLARY BLOOD GLUCOSE    POCT Blood Glucose.: 153 mg/dL (06 Sep 2021 07:26)  POCT Blood Glucose.: 208 mg/dL (05 Sep 2021 21:16)  POCT Blood Glucose.: 110 mg/dL (05 Sep 2021 16:59)  POCT Blood Glucose.: 97 mg/dL (05 Sep 2021 11:29)       PHYSICAL EXAM    Neurology: alert and oriented x 3, nonfocal, no gross deficits    CV: (+) S1 and S2, No murmurs, rubs, gallops or clicks     Sternal Wound: MSI -->CDI , sternum stable    Lungs: CTA B/L     Abdomen: soft, nontender, nondistended, positive bowel sounds, (+) Flatus; (+) BM     :  Voiding               Extremities:  B/L LE (+) 1 edema; negative calf tenderness; (+) 2 DP palpable        acetaminophen Tablet .. 650 milliGRAM(s) Oral every 6 hours PRN  aMIOdarone Tablet 200 milliGRAM(s) Oral daily  aspirin enteric coated 325 milliGRAM(s) Oral daily  atorvastatin 80 milliGRAM(s) Oral at bedtime  bisacodyl Suppository 10 milliGRAM(s) Rectal daily PRN  buDESOnide    Inhalation Suspension 0.5 milliGRAM(s) Inhalation two times a day  dextrose 40% Gel 15 Gram(s) Oral once  dextrose 50% Injectable 50 milliLiter(s) IV Push every 15 minutes  dextrose 50% Injectable 25 milliLiter(s) IV Push every 15 minutes  enoxaparin Injectable 40 milliGRAM(s) SubCutaneous daily  finasteride 5 milliGRAM(s) Oral daily  furosemide Tablet 40 milliGRAM(s) Oral two times a day  gabapentin 300 milliGRAM(s) Oral three times a day  gemfibrozil 600 milliGRAM(s) Oral two times a day  glucagon  Injectable 1 milliGRAM(s) IntraMuscular once  insulin glargine Injectable (LANTUS) 18 Unit(s) SubCutaneous at bedtime  insulin lispro (ADMELOG) corrective regimen sliding scale   SubCutaneous three times a day before meals  insulin lispro (ADMELOG) corrective regimen sliding scale   SubCutaneous at bedtime  insulin lispro Injectable (ADMELOG) 8 Unit(s) SubCutaneous three times a day before meals  metoprolol tartrate 25 milliGRAM(s) Oral two times a day  pantoprazole Tablet 40 milliGRAM(s) Oral before breakfast  polyethylene glycol 3350 17 Gram(s) Oral daily  senna 2 Tablet(s) Oral at bedtime  sodium chloride 0.9% lock flush 10 milliLiter(s) IV Push every 1 hour PRN  spironolactone 25 milliGRAM(s) Oral every 12 hours  tamsulosin 0.4 milliGRAM(s) Oral at bedtime    Physical Therapy Rec:   Home  [  ]   Home w/ PT  [ X ]  Rehab  [  ]    Discussed with Cardiothoracic Team at AM rounds.    Spent 30 min face to face encounter with patient and discharge note.

## 2021-09-04 NOTE — DISCHARGE NOTE PROVIDER - PROVIDER TOKENS
PROVIDER:[TOKEN:[[id number]],FOLLOWUP:[1 week]],PROVIDER:[TOKEN:[[id number]],FOLLOWUP:[2 weeks],ESTABLISHEDPATIENT:[T]],PROVIDER:[TOKEN:[[id number]],FOLLOWUP:[2 weeks]] PROVIDER:[TOKEN:[[id number]],FOLLOWUP:[1 week]],PROVIDER:[TOKEN:[[id number]],FOLLOWUP:[2 weeks],ESTABLISHEDPATIENT:[T]],PROVIDER:[[id number]],FOLLOWUP:[2 weeks]],FREE:[LAST:[Fahimuddin],FIRST:[Funez],PHONE:[phone]:[(   [phone]    -],ADDRESS:[Internal Medicine   [address] [place] [address], [id number]],FOLLOWUP:[2 weeks],ESTABLISHEDPATIENT:[T]]

## 2021-09-04 NOTE — DISCHARGE NOTE PROVIDER - CARE PROVIDERS DIRECT ADDRESSES
[email or web address]_Unknown,DirectAddress_Unknown [email or web address],DirectAddress_Unknown,DirectAddress_Unknown,DirectAddress_Unknown

## 2021-09-05 LAB
ALBUMIN SERPL ELPH-MCNC: 3.2 G/DL — LOW (ref 3.3–5)
ALP SERPL-CCNC: 120 U/L — SIGNIFICANT CHANGE UP (ref 40–120)
ALT FLD-CCNC: 116 U/L — HIGH (ref 10–45)
ANION GAP SERPL CALC-SCNC: 10 MMOL/L — SIGNIFICANT CHANGE UP (ref 5–17)
AST SERPL-CCNC: 21 U/L — SIGNIFICANT CHANGE UP (ref 10–40)
BILIRUB SERPL-MCNC: 1 MG/DL — SIGNIFICANT CHANGE UP (ref 0.2–1.2)
BUN SERPL-MCNC: 27 MG/DL — HIGH (ref 7–23)
CALCIUM SERPL-MCNC: 9.2 MG/DL — SIGNIFICANT CHANGE UP (ref 8.4–10.5)
CHLORIDE SERPL-SCNC: 102 MMOL/L — SIGNIFICANT CHANGE UP (ref 96–108)
CO2 SERPL-SCNC: 23 MMOL/L — SIGNIFICANT CHANGE UP (ref 22–31)
CREAT SERPL-MCNC: 1.34 MG/DL — HIGH (ref 0.5–1.3)
GLUCOSE BLDC GLUCOMTR-MCNC: 110 MG/DL — HIGH (ref 70–99)
GLUCOSE BLDC GLUCOMTR-MCNC: 184 MG/DL — HIGH (ref 70–99)
GLUCOSE BLDC GLUCOMTR-MCNC: 208 MG/DL — HIGH (ref 70–99)
GLUCOSE BLDC GLUCOMTR-MCNC: 97 MG/DL — SIGNIFICANT CHANGE UP (ref 70–99)
GLUCOSE SERPL-MCNC: 146 MG/DL — HIGH (ref 70–99)
HCT VFR BLD CALC: 27.8 % — LOW (ref 39–50)
HGB BLD-MCNC: 8.5 G/DL — LOW (ref 13–17)
MCHC RBC-ENTMCNC: 29.5 PG — SIGNIFICANT CHANGE UP (ref 27–34)
MCHC RBC-ENTMCNC: 30.6 GM/DL — LOW (ref 32–36)
MCV RBC AUTO: 96.5 FL — SIGNIFICANT CHANGE UP (ref 80–100)
NRBC # BLD: 0 /100 WBCS — SIGNIFICANT CHANGE UP (ref 0–0)
PLATELET # BLD AUTO: 530 K/UL — HIGH (ref 150–400)
POTASSIUM SERPL-MCNC: 3.9 MMOL/L — SIGNIFICANT CHANGE UP (ref 3.5–5.3)
POTASSIUM SERPL-SCNC: 3.9 MMOL/L — SIGNIFICANT CHANGE UP (ref 3.5–5.3)
PROT SERPL-MCNC: 6.4 G/DL — SIGNIFICANT CHANGE UP (ref 6–8.3)
RBC # BLD: 2.88 M/UL — LOW (ref 4.2–5.8)
RBC # FLD: 14.7 % — HIGH (ref 10.3–14.5)
SODIUM SERPL-SCNC: 135 MMOL/L — SIGNIFICANT CHANGE UP (ref 135–145)
WBC # BLD: 9.39 K/UL — SIGNIFICANT CHANGE UP (ref 3.8–10.5)
WBC # FLD AUTO: 9.39 K/UL — SIGNIFICANT CHANGE UP (ref 3.8–10.5)

## 2021-09-05 RX ADMIN — Medication 8 UNIT(S): at 17:20

## 2021-09-05 RX ADMIN — GABAPENTIN 300 MILLIGRAM(S): 400 CAPSULE ORAL at 21:59

## 2021-09-05 RX ADMIN — Medication 0.5 MILLIGRAM(S): at 06:06

## 2021-09-05 RX ADMIN — ENOXAPARIN SODIUM 40 MILLIGRAM(S): 100 INJECTION SUBCUTANEOUS at 11:07

## 2021-09-05 RX ADMIN — Medication 25 MILLIGRAM(S): at 17:53

## 2021-09-05 RX ADMIN — Medication 40 MILLIGRAM(S): at 17:20

## 2021-09-05 RX ADMIN — SENNA PLUS 2 TABLET(S): 8.6 TABLET ORAL at 22:00

## 2021-09-05 RX ADMIN — AMIODARONE HYDROCHLORIDE 400 MILLIGRAM(S): 400 TABLET ORAL at 06:07

## 2021-09-05 RX ADMIN — Medication 0.5 MILLIGRAM(S): at 17:20

## 2021-09-05 RX ADMIN — Medication 8 UNIT(S): at 11:48

## 2021-09-05 RX ADMIN — GABAPENTIN 300 MILLIGRAM(S): 400 CAPSULE ORAL at 06:07

## 2021-09-05 RX ADMIN — Medication 40 MILLIGRAM(S): at 06:07

## 2021-09-05 RX ADMIN — PANTOPRAZOLE SODIUM 40 MILLIGRAM(S): 20 TABLET, DELAYED RELEASE ORAL at 06:06

## 2021-09-05 RX ADMIN — Medication 8 UNIT(S): at 07:42

## 2021-09-05 RX ADMIN — FINASTERIDE 5 MILLIGRAM(S): 5 TABLET, FILM COATED ORAL at 11:07

## 2021-09-05 RX ADMIN — Medication 325 MILLIGRAM(S): at 11:07

## 2021-09-05 RX ADMIN — Medication 25 MILLIGRAM(S): at 06:07

## 2021-09-05 RX ADMIN — SPIRONOLACTONE 25 MILLIGRAM(S): 25 TABLET, FILM COATED ORAL at 06:07

## 2021-09-05 RX ADMIN — Medication 600 MILLIGRAM(S): at 17:52

## 2021-09-05 RX ADMIN — TAMSULOSIN HYDROCHLORIDE 0.4 MILLIGRAM(S): 0.4 CAPSULE ORAL at 21:59

## 2021-09-05 RX ADMIN — Medication 1: at 07:41

## 2021-09-05 RX ADMIN — AMIODARONE HYDROCHLORIDE 400 MILLIGRAM(S): 400 TABLET ORAL at 17:20

## 2021-09-05 RX ADMIN — POLYETHYLENE GLYCOL 3350 17 GRAM(S): 17 POWDER, FOR SOLUTION ORAL at 11:07

## 2021-09-05 RX ADMIN — Medication 600 MILLIGRAM(S): at 06:07

## 2021-09-05 RX ADMIN — ATORVASTATIN CALCIUM 80 MILLIGRAM(S): 80 TABLET, FILM COATED ORAL at 21:59

## 2021-09-05 RX ADMIN — INSULIN GLARGINE 18 UNIT(S): 100 INJECTION, SOLUTION SUBCUTANEOUS at 22:00

## 2021-09-05 RX ADMIN — SPIRONOLACTONE 25 MILLIGRAM(S): 25 TABLET, FILM COATED ORAL at 17:53

## 2021-09-05 RX ADMIN — GABAPENTIN 300 MILLIGRAM(S): 400 CAPSULE ORAL at 13:34

## 2021-09-05 NOTE — PROGRESS NOTE ADULT - ASSESSMENT
Assessment  DMT2: 65y Male with DM T2 with hyperglycemia, A1C 7.1%, was on several oral meds at home, postop on basal bolus insulin, increased dose yesterday, blood sugars are fluctuating, no hypoglycemic episodes. Patient is eating meals inconsistently, appears alert and comfortable, DC planning in progress.  CAD: on medications, no chest pain, stable, monitored.  HTN: Controlled,  on antihypertensive medications.  Obesity: No strict exercise routines, not on any weight loss program, neither on low calorie diet.        Cb Hill MD  Cell: [phone]  Office: [phone]     Assessment  DMT2: 65y Male with DM T2 with hyperglycemia, A1C 7.1%, was on several oral meds at home, postop on basal bolus insulin, increased dose yesterday, blood sugars are fluctuating, no hypoglycemic episodes. Patient is eating meals inconsistently,  appears alert and comfortable, DC planning in progress.  CAD: on medications, no chest pain, stable, monitored.  HTN: Controlled,  on antihypertensive medications.  Obesity: No strict exercise routines, not on any weight loss program, neither on low calorie diet.        Cb Hill MD  Cell: [phone]  Office: [phone]

## 2021-09-05 NOTE — PROGRESS NOTE ADULT - ASSESSMENT
65M, (denies implanted devices) with significant PMHx of T2DM (last HgbA1c 8.1, managed by PCP, complicated by peripheral neuropathy), HLD, HTN, BPH presented for RHC/LHC. Patient c/o chest discomfort with exertion that has been becoming progressively worse and resolves with rest associated with SOB.  Patient recently went to [facility] ER on 7/29 and patient states results were normal. Patient seen and evaluated by Dr. Celestin and presented for further evaluation and cardiac Cath.  Cath finding revealed: proximal left main 50 %, proximal LAD 95 % stenosis, proximal circumflex: 95 % and proximal RCA: 95 % stenosis. CTS consult called to evaluate patient for cardiac surgery with Dr. Deep Valerio.   8/22 P2Y12 80. Continue with BBlocker, statin asa. CABG tentatively scheduled in am pending repeat p2y12 in am 0400  8/23 CABG postponed P2Y12> 92 this am  Endo consulted elevated BS, Start statin (lopid at home)   ASA betablocker CAD Recheck P2Y12 am  8/24 P2Y12 @ 5pm-->   T&S to be sent at 5pm as well.   8/25 VVS; Hyperglycemia --> BFG > 200.  Transferred to SDU for Insulin gtt.  NPO after midnight  8/26 Off isulin gtt, resumed this  ирина, OR tomorrow.   8/27/21: s/p CABG x 3  Post op Course:   8/30/21: Orthostatic in ICU, given volume resuscitation, transferred to floor, VSS, weaning to room air, ambulation, IS. ASA/BB/STATIN    8/31 Asymptomatic hypotension --> BP 88/60, Brief rate controlled afib --> increased Lopressor to 25 mg PO q12 and Lasix 20 x 1 today for edema.   9/1 VVS; Continue on modified Amio load 400 mg PO BID.  Now NSR; Continue with current medication regimen.  Insulin teaching using teach back method.   9/2 VSS - lasix & aldactone started this am as per Dr. Valerio.  oob.    9/3 Continue with bronchodilator and chest pt for right lobe atelectasis, Lasix and aldactone increased for 8kg weight gain, Continue with insulin teaching and diabetic education.  9/2 Basal bolus insulin requirements minimal. D/C home on oral antidiabetic agents. Continue with diuresis as he is 8Kg above preop weight. Pt medically cleared for discharge home today however requesting discharge home Monday.  9/3 VSS continue with present medication.

## 2021-09-05 NOTE — PROGRESS NOTE ADULT - SUBJECTIVE AND OBJECTIVE BOX
Chief complaint  Patient is a 65y old  Male who presents with a chief complaint of CAD (04 Sep 2021 12:25)   Review of systems  Patient awake in chair, looks comfortable, no hypoglycemic episodes.    Labs and Fingersticks  CAPILLARY BLOOD GLUCOSE      POCT Blood Glucose.: 97 mg/dL (05 Sep 2021 11:29)  POCT Blood Glucose.: 184 mg/dL (05 Sep 2021 07:36)  POCT Blood Glucose.: 232 mg/dL (04 Sep 2021 20:47)  POCT Blood Glucose.: 232 mg/dL (04 Sep 2021 16:50)      Anion Gap, Serum: 10 (09-05 @ 09:54)  Anion Gap, Serum: 13 (09-04 @ 06:32)      Calcium, Total Serum: 9.2 (09-05 @ 09:54)  Calcium, Total Serum: 8.8 (09-04 @ 06:32)  Albumin, Serum: 3.2 *L* (09-05 @ 09:54)    Alanine Aminotransferase (ALT/SGPT): 116 *H* (09-05 @ 09:54)  Alkaline Phosphatase, Serum: 120 (09-05 @ 09:54)  Aspartate Aminotransferase (AST/SGOT): 21 (09-05 @ 09:54)        09-05    135  |  102  |  27<H>  ----------------------------<  146<H>  3.9   |  23  |  1.34<H>    Ca    9.2      05 Sep 2021 09:54    TPro  6.4  /  Alb  3.2<L>  /  TBili  1.0  /  DBili  x   /  AST  21  /  ALT  116<H>  /  AlkPhos  120  09-05                        8.5    9.39  )-----------( 530      ( 05 Sep 2021 09:54 )             27.8     Medications  MEDICATIONS  (STANDING):  aMIOdarone    Tablet 400 milliGRAM(s) Oral two times a day  aMIOdarone    Tablet 200 milliGRAM(s) Oral daily  aspirin enteric coated 325 milliGRAM(s) Oral daily  atorvastatin 80 milliGRAM(s) Oral at bedtime  buDESOnide    Inhalation Suspension 0.5 milliGRAM(s) Inhalation two times a day  dextrose 40% Gel 15 Gram(s) Oral once  dextrose 50% Injectable 50 milliLiter(s) IV Push every 15 minutes  dextrose 50% Injectable 25 milliLiter(s) IV Push every 15 minutes  enoxaparin Injectable 40 milliGRAM(s) SubCutaneous daily  finasteride 5 milliGRAM(s) Oral daily  furosemide    Tablet 40 milliGRAM(s) Oral two times a day  gabapentin 300 milliGRAM(s) Oral three times a day  gemfibrozil 600 milliGRAM(s) Oral two times a day  glucagon  Injectable 1 milliGRAM(s) IntraMuscular once  insulin glargine Injectable (LANTUS) 18 Unit(s) SubCutaneous at bedtime  insulin lispro (ADMELOG) corrective regimen sliding scale   SubCutaneous three times a day before meals  insulin lispro (ADMELOG) corrective regimen sliding scale   SubCutaneous at bedtime  insulin lispro Injectable (ADMELOG) 8 Unit(s) SubCutaneous three times a day before meals  metoprolol tartrate 25 milliGRAM(s) Oral two times a day  pantoprazole    Tablet 40 milliGRAM(s) Oral before breakfast  polyethylene glycol 3350 17 Gram(s) Oral daily  senna 2 Tablet(s) Oral at bedtime  spironolactone 25 milliGRAM(s) Oral every 12 hours  tamsulosin 0.4 milliGRAM(s) Oral at bedtime      Physical Exam  General: Patient comfortable in bed  Vital Signs Last 12 Hrs  T(F): 97.8 (09-05-21 @ 12:27), Max: 98.3 (09-05-21 @ 04:40)  HR: 70 (09-05-21 @ 12:27) (63 - 70)  BP: 101/63 (09-05-21 @ 12:27) (101/63 - 107/65)  BP(mean): --  RR: 18 (09-05-21 @ 12:27) (18 - 18)  SpO2: 98% (09-05-21 @ 12:27) (98% - 98%)  Neck: No palpable thyroid nodules.  CVS: S1S2, No murmurs  Respiratory: No wheezing, no crepitations  GI: Abdomen soft, bowel sounds positive  Musculoskeletal:  edema lower extremities.   Skin: No skin rashes, no ecchymosis    Diagnostics             Chief complaint  Patient is a 65y old  Male who presents with a chief complaint of CAD (04 Sep 2021 12:25)   Review of systems  Patient awake in chair, looks comfortable, no hypoglycemic episodes.    Labs and Fingersticks  CAPILLARY BLOOD GLUCOSE      POCT Blood Glucose.: 97 mg/dL (05 Sep 2021 11:29)  POCT Blood Glucose.: 184 mg/dL (05 Sep 2021 07:36)  POCT Blood Glucose.: 232 mg/dL (04 Sep 2021 20:47)  POCT Blood Glucose.: 232 mg/dL (04 Sep 2021 16:50)      Anion Gap, Serum: 10 (09-05 @ 09:54)  Anion Gap, Serum: 13 (09-04 @ 06:32)      Calcium, Total Serum: 9.2 (09-05 @ 09:54)  Calcium, Total Serum: 8.8 (09-04 @ 06:32)  Albumin, Serum: 3.2 *L* (09-05 @ 09:54)    Alanine Aminotransferase (ALT/SGPT): 116 *H* (09-05 @ 09:54)  Alkaline Phosphatase, Serum: 120 (09-05 @ 09:54)  Aspartate Aminotransferase (AST/SGOT): 21 (09-05 @ 09:54)        09-05    135  |  102  |  27<H>  ----------------------------<  146<H>  3.9   |  23  |  1.34<H>    Ca    9.2      05 Sep 2021 09:54    TPro  6.4  /  Alb  3.2<L>  /  TBili  1.0  /  DBili  x   /  AST  21  /  ALT  116<H>  /  AlkPhos  120  09-05                        8.5    9.39  )-----------( 530      ( 05 Sep 2021 09:54 )             27.8     Medications  MEDICATIONS  (STANDING):  aMIOdarone    Tablet 400 milliGRAM(s) Oral two times a day  aMIOdarone    Tablet 200 milliGRAM(s) Oral daily  aspirin enteric coated 325 milliGRAM(s) Oral daily  atorvastatin 80 milliGRAM(s) Oral at bedtime  buDESOnide    Inhalation Suspension 0.5 milliGRAM(s) Inhalation two times a day  dextrose 40% Gel 15 Gram(s) Oral once  dextrose 50% Injectable 50 milliLiter(s) IV Push every 15 minutes  dextrose 50% Injectable 25 milliLiter(s) IV Push every 15 minutes  enoxaparin Injectable 40 milliGRAM(s) SubCutaneous daily  finasteride 5 milliGRAM(s) Oral daily  furosemide    Tablet 40 milliGRAM(s) Oral two times a day  gabapentin 300 milliGRAM(s) Oral three times a day  gemfibrozil 600 milliGRAM(s) Oral two times a day  glucagon  Injectable 1 milliGRAM(s) IntraMuscular once  insulin glargine Injectable (LANTUS) 18 Unit(s) SubCutaneous at bedtime  insulin lispro (ADMELOG) corrective regimen sliding scale   SubCutaneous three times a day before meals  insulin lispro (ADMELOG) corrective regimen sliding scale   SubCutaneous at bedtime  insulin lispro Injectable (ADMELOG) 8 Unit(s) SubCutaneous three times a day before meals  metoprolol tartrate 25 milliGRAM(s) Oral two times a day  pantoprazole    Tablet 40 milliGRAM(s) Oral before breakfast  polyethylene glycol 3350 17 Gram(s) Oral daily  senna 2 Tablet(s) Oral at bedtime  spironolactone 25 milliGRAM(s) Oral every 12 hours  tamsulosin 0.4 milliGRAM(s) Oral at bedtime      Physical Exam  General: Patient comfortable in bed  Vital Signs Last 12 Hrs  T(F): 97.8 (09-05-21 @ 12:27), Max: 98.3 (09-05-21 @ 04:40)  HR: 70 (09-05-21 @ 12:27) (63 - 70)  BP: 101/63 (09-05-21 @ 12:27) (101/63 - 107/65)  BP(mean): --  RR: 18 (09-05-21 @ 12:27) (18 - 18)  SpO2: 98% (09-05-21 @ 12:27) (98% - 98%)  Neck: No palpable thyroid nodules.  CVS: S1S2, No murmurs  Respiratory: No wheezing, no crepitations  GI: Abdomen soft, bowel sounds positive  Musculoskeletal:  edema lower extremities.   Skin: No skin rashes, no ecchymosis    Diagnostics

## 2021-09-05 NOTE — PROGRESS NOTE ADULT - PROBLEM SELECTOR PLAN 1
Will continue current insulin regimen for now. Will continue monitoring FS and FU.  Suggest DC on Janumet 50/1000 BID and glimepiride 2mg daily AM (discontinue prior oral meds), endo FU 4 weeks.  Counseled for compliance with consistent low carb diet and exercise as tolerated outpatient.  S/P JUANCARLOS espino for additional recommendations.

## 2021-09-05 NOTE — PROGRESS NOTE ADULT - SUBJECTIVE AND OBJECTIVE BOX
VITAL SIGNS    Telemetry:  Sr 60-70  Vital Signs Last 24 Hrs  T(C): 36.6 (21 @ 12:27), Max: 36.8 (21 @ 20:14)  T(F): 97.8 (21 @ 12:27), Max: 98.3 (21 @ 04:40)  HR: 70 (21 @ 12:27) (63 - 70)  BP: 101/63 (21 @ 12:27) (101/63 - 107/65)  RR: 18 (21 @ 12:27) (18 - 18)  SpO2: 98% (21 @ 12:27) (97% - 98%)             @ 07:01  -   @ 07:00  --------------------------------------------------------  IN: 1560 mL / OUT: 3350 mL / NET: -1790 mL     @ 07:01  -   @ 16:13  --------------------------------------------------------  IN: 600 mL / OUT: 800 mL / NET: -200 mL       Daily     Daily Weight in k.7 (05 Sep 2021 08:00)  Admit Wt: Drug Dosing Weight  Height (cm): 167.6 (27 Aug 2021 06:32)  Weight (kg): 86.2 (27 Aug 2021 06:32)  BMI (kg/m2): 30.7 (27 Aug 2021 06:32)  BSA (m2): 1.96 (27 Aug 2021 06:32)    Bilirubin Total, Serum: 1.0 mg/dL ( @ 09:54)    CAPILLARY BLOOD GLUCOSE      POCT Blood Glucose.: 97 mg/dL (05 Sep 2021 11:29)  POCT Blood Glucose.: 184 mg/dL (05 Sep 2021 07:36)  POCT Blood Glucose.: 232 mg/dL (04 Sep 2021 20:47)  POCT Blood Glucose.: 232 mg/dL (04 Sep 2021 16:50)          MEDICATIONS  acetaminophen   Tablet .. 650 milliGRAM(s) Oral every 6 hours PRN  aMIOdarone    Tablet 400 milliGRAM(s) Oral two times a day  aMIOdarone    Tablet 200 milliGRAM(s) Oral daily  aspirin enteric coated 325 milliGRAM(s) Oral daily  atorvastatin 80 milliGRAM(s) Oral at bedtime  bisacodyl Suppository 10 milliGRAM(s) Rectal daily PRN  buDESOnide    Inhalation Suspension 0.5 milliGRAM(s) Inhalation two times a day  dextrose 40% Gel 15 Gram(s) Oral once  dextrose 50% Injectable 50 milliLiter(s) IV Push every 15 minutes  dextrose 50% Injectable 25 milliLiter(s) IV Push every 15 minutes  enoxaparin Injectable 40 milliGRAM(s) SubCutaneous daily  finasteride 5 milliGRAM(s) Oral daily  furosemide    Tablet 40 milliGRAM(s) Oral two times a day  gabapentin 300 milliGRAM(s) Oral three times a day  gemfibrozil 600 milliGRAM(s) Oral two times a day  glucagon  Injectable 1 milliGRAM(s) IntraMuscular once  insulin glargine Injectable (LANTUS) 18 Unit(s) SubCutaneous at bedtime  insulin lispro (ADMELOG) corrective regimen sliding scale   SubCutaneous three times a day before meals  insulin lispro (ADMELOG) corrective regimen sliding scale   SubCutaneous at bedtime  insulin lispro Injectable (ADMELOG) 8 Unit(s) SubCutaneous three times a day before meals  metoprolol tartrate 25 milliGRAM(s) Oral two times a day  oxyCODONE    IR 5 milliGRAM(s) Oral every 6 hours PRN  pantoprazole    Tablet 40 milliGRAM(s) Oral before breakfast  polyethylene glycol 3350 17 Gram(s) Oral daily  senna 2 Tablet(s) Oral at bedtime  sodium chloride 0.9% lock flush 10 milliLiter(s) IV Push every 1 hour PRN  spironolactone 25 milliGRAM(s) Oral every 12 hours  tamsulosin 0.4 milliGRAM(s) Oral at bedtime      >>> <<<  PHYSICAL EXAM  Subjective: NAD  Neurology: alert and oriented x 3, nonfocal, no gross deficits  CV : s1s2  Sternal Wound :  CDI , Stable  Lungs:CTA b/l  Abdomen: soft, NT,ND, ( +)BM  :  voiding  Extremities: -c/c/e      LABS      135  |  102  |  27<H>  ----------------------------<  146<H>  3.9   |  23  |  1.34<H>    Ca    9.2      05 Sep 2021 09:54    TPro  6.4  /  Alb  3.2<L>  /  TBili  1.0  /  DBili  x   /  AST  21  /  ALT  116<H>  /  AlkPhos  120                                   8.5    9.39  )-----------( 530      ( 05 Sep 2021 09:54 )             27.8                 PAST MEDICAL & SURGICAL HISTORY:  T2DM (type 2 diabetes mellitus)  c/b peripheral neuropathy    HTN (hypertension)    HLD (hyperlipidemia)    BPH (benign prostatic hyperplasia)    H/O shoulder surgery  right    H/O arthroscopy of knee    S/P arthroscopy of right knee

## 2021-09-06 VITALS — WEIGHT: 195.99 LBS

## 2021-09-06 LAB
ALBUMIN SERPL ELPH-MCNC: 3.2 G/DL — LOW (ref 3.3–5)
ALP SERPL-CCNC: 111 U/L — SIGNIFICANT CHANGE UP (ref 40–120)
ALT FLD-CCNC: 86 U/L — HIGH (ref 10–45)
ANION GAP SERPL CALC-SCNC: 13 MMOL/L — SIGNIFICANT CHANGE UP (ref 5–17)
AST SERPL-CCNC: 18 U/L — SIGNIFICANT CHANGE UP (ref 10–40)
BILIRUB SERPL-MCNC: 1 MG/DL — SIGNIFICANT CHANGE UP (ref 0.2–1.2)
BUN SERPL-MCNC: 26 MG/DL — HIGH (ref 7–23)
CALCIUM SERPL-MCNC: 9.2 MG/DL — SIGNIFICANT CHANGE UP (ref 8.4–10.5)
CHLORIDE SERPL-SCNC: 102 MMOL/L — SIGNIFICANT CHANGE UP (ref 96–108)
CO2 SERPL-SCNC: 23 MMOL/L — SIGNIFICANT CHANGE UP (ref 22–31)
CREAT SERPL-MCNC: 1.31 MG/DL — HIGH (ref 0.5–1.3)
GLUCOSE BLDC GLUCOMTR-MCNC: 153 MG/DL — HIGH (ref 70–99)
GLUCOSE SERPL-MCNC: 159 MG/DL — HIGH (ref 70–99)
HCT VFR BLD CALC: 26 % — LOW (ref 39–50)
HGB BLD-MCNC: 8.2 G/DL — LOW (ref 13–17)
MCHC RBC-ENTMCNC: 29.8 PG — SIGNIFICANT CHANGE UP (ref 27–34)
MCHC RBC-ENTMCNC: 31.5 GM/DL — LOW (ref 32–36)
MCV RBC AUTO: 94.5 FL — SIGNIFICANT CHANGE UP (ref 80–100)
NRBC # BLD: 0 /100 WBCS — SIGNIFICANT CHANGE UP (ref 0–0)
PLATELET # BLD AUTO: 547 K/UL — HIGH (ref 150–400)
POTASSIUM SERPL-MCNC: 4 MMOL/L — SIGNIFICANT CHANGE UP (ref 3.5–5.3)
POTASSIUM SERPL-SCNC: 4 MMOL/L — SIGNIFICANT CHANGE UP (ref 3.5–5.3)
PROT SERPL-MCNC: 6.1 G/DL — SIGNIFICANT CHANGE UP (ref 6–8.3)
RBC # BLD: 2.75 M/UL — LOW (ref 4.2–5.8)
RBC # FLD: 14.6 % — HIGH (ref 10.3–14.5)
SODIUM SERPL-SCNC: 138 MMOL/L — SIGNIFICANT CHANGE UP (ref 135–145)
WBC # BLD: 8.59 K/UL — SIGNIFICANT CHANGE UP (ref 3.8–10.5)
WBC # FLD AUTO: 8.59 K/UL — SIGNIFICANT CHANGE UP (ref 3.8–10.5)

## 2021-09-06 PROCEDURE — 80076 HEPATIC FUNCTION PANEL: CPT

## 2021-09-06 PROCEDURE — 84100 ASSAY OF PHOSPHORUS: CPT

## 2021-09-06 PROCEDURE — U0005: CPT

## 2021-09-06 PROCEDURE — 83605 ASSAY OF LACTIC ACID: CPT

## 2021-09-06 PROCEDURE — 81003 URINALYSIS AUTO W/O SCOPE: CPT

## 2021-09-06 PROCEDURE — 93880 EXTRACRANIAL BILAT STUDY: CPT

## 2021-09-06 PROCEDURE — 82962 GLUCOSE BLOOD TEST: CPT

## 2021-09-06 PROCEDURE — C8929: CPT

## 2021-09-06 PROCEDURE — 94640 AIRWAY INHALATION TREATMENT: CPT

## 2021-09-06 PROCEDURE — P9016: CPT

## 2021-09-06 PROCEDURE — P9047: CPT

## 2021-09-06 PROCEDURE — 97530 THERAPEUTIC ACTIVITIES: CPT

## 2021-09-06 PROCEDURE — 84439 ASSAY OF FREE THYROXINE: CPT

## 2021-09-06 PROCEDURE — C1751: CPT

## 2021-09-06 PROCEDURE — C1887: CPT

## 2021-09-06 PROCEDURE — 97162 PT EVAL MOD COMPLEX 30 MIN: CPT

## 2021-09-06 PROCEDURE — P9045: CPT

## 2021-09-06 PROCEDURE — C1894: CPT

## 2021-09-06 PROCEDURE — 82550 ASSAY OF CK (CPK): CPT

## 2021-09-06 PROCEDURE — C1769: CPT

## 2021-09-06 PROCEDURE — 84443 ASSAY THYROID STIM HORMONE: CPT

## 2021-09-06 PROCEDURE — 86769 SARS-COV-2 COVID-19 ANTIBODY: CPT

## 2021-09-06 PROCEDURE — 84436 ASSAY OF TOTAL THYROXINE: CPT

## 2021-09-06 PROCEDURE — 85014 HEMATOCRIT: CPT

## 2021-09-06 PROCEDURE — 85576 BLOOD PLATELET AGGREGATION: CPT

## 2021-09-06 PROCEDURE — U0003: CPT

## 2021-09-06 PROCEDURE — 86965 POOLING BLOOD PLATELETS: CPT

## 2021-09-06 PROCEDURE — 82947 ASSAY GLUCOSE BLOOD QUANT: CPT

## 2021-09-06 PROCEDURE — 85730 THROMBOPLASTIN TIME PARTIAL: CPT

## 2021-09-06 PROCEDURE — 36430 TRANSFUSION BLD/BLD COMPNT: CPT

## 2021-09-06 PROCEDURE — 86850 RBC ANTIBODY SCREEN: CPT

## 2021-09-06 PROCEDURE — 31720 CLEARANCE OF AIRWAYS: CPT

## 2021-09-06 PROCEDURE — 83735 ASSAY OF MAGNESIUM: CPT

## 2021-09-06 PROCEDURE — 85027 COMPLETE CBC AUTOMATED: CPT

## 2021-09-06 PROCEDURE — 87640 STAPH A DNA AMP PROBE: CPT

## 2021-09-06 PROCEDURE — 94010 BREATHING CAPACITY TEST: CPT

## 2021-09-06 PROCEDURE — 86900 BLOOD TYPING SEROLOGIC ABO: CPT

## 2021-09-06 PROCEDURE — P9012: CPT

## 2021-09-06 PROCEDURE — 99153 MOD SED SAME PHYS/QHP EA: CPT

## 2021-09-06 PROCEDURE — 83880 ASSAY OF NATRIURETIC PEPTIDE: CPT

## 2021-09-06 PROCEDURE — 80053 COMPREHEN METABOLIC PANEL: CPT

## 2021-09-06 PROCEDURE — 84295 ASSAY OF SERUM SODIUM: CPT

## 2021-09-06 PROCEDURE — 93005 ELECTROCARDIOGRAM TRACING: CPT

## 2021-09-06 PROCEDURE — 85384 FIBRINOGEN ACTIVITY: CPT

## 2021-09-06 PROCEDURE — 84480 ASSAY TRIIODOTHYRONINE (T3): CPT

## 2021-09-06 PROCEDURE — 85610 PROTHROMBIN TIME: CPT

## 2021-09-06 PROCEDURE — 86923 COMPATIBILITY TEST ELECTRIC: CPT

## 2021-09-06 PROCEDURE — 94002 VENT MGMT INPAT INIT DAY: CPT

## 2021-09-06 PROCEDURE — 80048 BASIC METABOLIC PNL TOTAL CA: CPT

## 2021-09-06 PROCEDURE — 85025 COMPLETE CBC W/AUTO DIFF WBC: CPT

## 2021-09-06 PROCEDURE — 84484 ASSAY OF TROPONIN QUANT: CPT

## 2021-09-06 PROCEDURE — 82435 ASSAY OF BLOOD CHLORIDE: CPT

## 2021-09-06 PROCEDURE — C1889: CPT

## 2021-09-06 PROCEDURE — 82803 BLOOD GASES ANY COMBINATION: CPT

## 2021-09-06 PROCEDURE — 84132 ASSAY OF SERUM POTASSIUM: CPT

## 2021-09-06 PROCEDURE — 82565 ASSAY OF CREATININE: CPT

## 2021-09-06 PROCEDURE — 93308 TTE F-UP OR LMTD: CPT

## 2021-09-06 PROCEDURE — 93460 R&L HRT ART/VENTRICLE ANGIO: CPT

## 2021-09-06 PROCEDURE — P9037: CPT

## 2021-09-06 PROCEDURE — 82553 CREATINE MB FRACTION: CPT

## 2021-09-06 PROCEDURE — 82330 ASSAY OF CALCIUM: CPT

## 2021-09-06 PROCEDURE — 86901 BLOOD TYPING SEROLOGIC RH(D): CPT

## 2021-09-06 PROCEDURE — 86891 AUTOLOGOUS BLOOD OP SALVAGE: CPT

## 2021-09-06 PROCEDURE — 80061 LIPID PANEL: CPT

## 2021-09-06 PROCEDURE — 85018 HEMOGLOBIN: CPT

## 2021-09-06 PROCEDURE — 99152 MOD SED SAME PHYS/QHP 5/>YRS: CPT

## 2021-09-06 PROCEDURE — 83036 HEMOGLOBIN GLYCOSYLATED A1C: CPT

## 2021-09-06 PROCEDURE — 71045 X-RAY EXAM CHEST 1 VIEW: CPT

## 2021-09-06 PROCEDURE — 74018 RADEX ABDOMEN 1 VIEW: CPT

## 2021-09-06 PROCEDURE — 97116 GAIT TRAINING THERAPY: CPT

## 2021-09-06 PROCEDURE — 87641 MR-STAPH DNA AMP PROBE: CPT

## 2021-09-06 PROCEDURE — 93321 DOPPLER ECHO F-UP/LMTD STD: CPT

## 2021-09-06 RX ORDER — ASPIRIN/CALCIUM CARB/MAGNESIUM 324 MG
1 TABLET ORAL
Qty: 30 | Refills: 0
Start: 2021-09-06 | End: 2021-10-05

## 2021-09-06 RX ORDER — SENNA PLUS 8.6 MG/1
2 TABLET ORAL
Qty: 28 | Refills: 0
Start: 2021-09-06 | End: 2021-09-19

## 2021-09-06 RX ORDER — SPIRONOLACTONE 25 MG/1
1 TABLET, FILM COATED ORAL
Qty: 7 | Refills: 0
Start: 2021-09-06 | End: 2021-09-12

## 2021-09-06 RX ORDER — ACETAMINOPHEN 500 MG
2 TABLET ORAL
Qty: 0 | Refills: 0 | DISCHARGE
Start: 2021-09-06

## 2021-09-06 RX ORDER — SITAGLIPTIN AND METFORMIN HYDROCHLORIDE 500; 50 MG/1; MG/1
1 TABLET, FILM COATED ORAL
Qty: 60 | Refills: 0
Start: 2021-09-06 | End: 2021-10-05

## 2021-09-06 RX ORDER — EMPAGLIFLOZIN 10 MG/1
1 TABLET, FILM COATED ORAL
Qty: 0 | Refills: 0 | DISCHARGE

## 2021-09-06 RX ORDER — METFORMIN HYDROCHLORIDE 850 MG/1
1 TABLET ORAL
Qty: 0 | Refills: 0 | DISCHARGE

## 2021-09-06 RX ORDER — AMIODARONE HYDROCHLORIDE 400 MG/1
1 TABLET ORAL
Qty: 30 | Refills: 0
Start: 2021-09-06 | End: 2021-10-05

## 2021-09-06 RX ORDER — POLYETHYLENE GLYCOL 3350 17 G/17G
17 POWDER, FOR SOLUTION ORAL
Qty: 238 | Refills: 0
Start: 2021-09-06 | End: 2021-09-19

## 2021-09-06 RX ORDER — PANTOPRAZOLE SODIUM 20 MG/1
1 TABLET, DELAYED RELEASE ORAL
Qty: 30 | Refills: 0
Start: 2021-09-06 | End: 2021-10-05

## 2021-09-06 RX ORDER — METOPROLOL TARTRATE 50 MG
1 TABLET ORAL
Qty: 60 | Refills: 0
Start: 2021-09-06 | End: 2021-10-05

## 2021-09-06 RX ORDER — ASPIRIN/CALCIUM CARB/MAGNESIUM 324 MG
1 TABLET ORAL
Qty: 0 | Refills: 0 | DISCHARGE

## 2021-09-06 RX ORDER — SITAGLIPTIN 50 MG/1
1 TABLET, FILM COATED ORAL
Qty: 0 | Refills: 0 | DISCHARGE

## 2021-09-06 RX ORDER — GLIMEPIRIDE 1 MG
1 TABLET ORAL
Qty: 30 | Refills: 0
Start: 2021-09-06 | End: 2021-10-05

## 2021-09-06 RX ORDER — ATORVASTATIN CALCIUM 80 MG/1
1 TABLET, FILM COATED ORAL
Qty: 30 | Refills: 0
Start: 2021-09-06 | End: 2021-10-05

## 2021-09-06 RX ORDER — FUROSEMIDE 40 MG
1 TABLET ORAL
Qty: 7 | Refills: 0
Start: 2021-09-06 | End: 2021-09-12

## 2021-09-06 RX ADMIN — GABAPENTIN 300 MILLIGRAM(S): 400 CAPSULE ORAL at 06:15

## 2021-09-06 RX ADMIN — Medication 40 MILLIGRAM(S): at 06:15

## 2021-09-06 RX ADMIN — Medication 8 UNIT(S): at 08:08

## 2021-09-06 RX ADMIN — Medication 25 MILLIGRAM(S): at 06:14

## 2021-09-06 RX ADMIN — Medication 1: at 08:08

## 2021-09-06 RX ADMIN — PANTOPRAZOLE SODIUM 40 MILLIGRAM(S): 20 TABLET, DELAYED RELEASE ORAL at 06:16

## 2021-09-06 RX ADMIN — SPIRONOLACTONE 25 MILLIGRAM(S): 25 TABLET, FILM COATED ORAL at 06:15

## 2021-09-06 RX ADMIN — AMIODARONE HYDROCHLORIDE 400 MILLIGRAM(S): 400 TABLET ORAL at 06:14

## 2021-09-06 RX ADMIN — Medication 0.5 MILLIGRAM(S): at 06:16

## 2021-09-06 RX ADMIN — Medication 600 MILLIGRAM(S): at 06:15

## 2021-09-06 NOTE — PROGRESS NOTE ADULT - PROBLEM SELECTOR PLAN 3
Will increase Lantus to 18u at bedtime.  Will increase Admelog to 8u before each meal and continue Admelog correction scale coverage. Will continue monitoring FS and FU.  Patient counseled for compliance with consistent low carb diet and exercise as tolerated outpatient.
Suggest to continue medications, monitoring, FU primary team recommendations.
Will start Lantus 22 units at bed time.  Will start Admelog 8 units before each meal in addition to Admelog correction scale coverage.  Patient counseled for compliance with consistent low carb diet.
Patient requires tight glycemic control. Will DC all subQ insulin, suggest to keep him on IV insulin preoperatively. Will continue monitoring FS and FU.  Discussed plan with patient. Counseled for compliance with consistent low carb diet and exercise as tolerated outpatient.
Suggest to continue medications, monitoring, FU primary team recommendations.
sp  CABG
Endo following  Transferred to SDU for Insulin gtt.
Suggest to continue medications, monitoring, FU primary team recommendations.
Tight glycemic control necessary. Patient will be on IV insulin postoperatively.  Will continue monitoring FS and FU; Will transition to basal bolus insulin regimen once blood sugars are stable and patient is eating meals.
titrate BB as tolerated
Suggest to continue medications, monitoring, FU primary team recommendations.
Will continue Lantus 24 units at bed time.  Will increase Admelog to 12 units before each meal and adjust Admelog correction scale coverage to be low-scale ac/hs. Will continue monitoring FS and FU.  Patient counseled for compliance with consistent low carb diet and exercise as tolerated outpatient.
Endo following  Transferred to SDU for Insulin gtt.
Patient requires tight glycemic control preoperatively. Suggest to keep on IV insulin. Will continue monitoring FS and FU.  Discussed plan with patient. Counseled for compliance with consistent low carb diet and exercise as tolerated outpatient.
Suggest to continue medications, monitoring, FU primary team recommendations.
sp  CABG

## 2021-09-06 NOTE — PROGRESS NOTE ADULT - PROBLEM SELECTOR PLAN 4
Overweight/Obesity: Patient counseled for weight loss, exercise, low calorie diet.
Overweight/Obesity: Patient counseled for weight loss, exercise, low calorie diet.
Overweight/Obesity: Patient counseled for weight loss, exercise, low calorie diet.  S/P RD eval for additional recommendations.
Overweight/Obesity: Patient counseled for weight loss, exercise, low calorie diet.
Overweight/Obesity: Patient counseled for weight loss, exercise, low calorie diet.  S/P RD eval for additional recommendations.
Overweight/Obesity: Patient counseled for weight loss, exercise, low calorie diet.
Overweight/Obesity: Patient counseled for weight loss, exercise, low calorie diet.

## 2021-09-06 NOTE — PROGRESS NOTE ADULT - PROBLEM SELECTOR PROBLEM 3
DM (diabetes mellitus)
HTN (hypertension)
DM (diabetes mellitus)
HTN (hypertension)
DM (diabetes mellitus)
CAD (coronary artery disease)
HTN (hypertension)
DM (diabetes mellitus)
CAD (coronary artery disease)
DM (diabetes mellitus)
HTN (hypertension)

## 2021-09-06 NOTE — PROGRESS NOTE ADULT - ASSESSMENT
Assessment  DMT2: 65y Male with DM T2 with hyperglycemia, A1C 7.1%, was on several oral meds at home, postop on basal bolus insulin, blood sugars are fluctuating within overall acceptable range, no hypoglycemic episodes. Patient is eating meals inconsistently,  appears alert and comfortable, planning DC.  CAD: on medications, no chest pain, stable, monitored.  HTN: Controlled,  on antihypertensive medications.  Obesity: No strict exercise routines, not on any weight loss program, neither on low calorie diet.        Cb Hill MD  Cell: [phone]  Office: [phone]

## 2021-09-06 NOTE — PROGRESS NOTE ADULT - SUBJECTIVE AND OBJECTIVE BOX
Chief complaint  Patient is a 65y old  Male who presents with a chief complaint of CAD (05 Sep 2021 16:12)   Review of systems  Patient in bed, looks comfortable, no hypoglycemic episodes.    Labs and Fingersticks  CAPILLARY BLOOD GLUCOSE      POCT Blood Glucose.: 153 mg/dL (06 Sep 2021 07:26)  POCT Blood Glucose.: 208 mg/dL (05 Sep 2021 21:16)  POCT Blood Glucose.: 110 mg/dL (05 Sep 2021 16:59)      Anion Gap, Serum: 13 (09-06 @ 06:08)  Anion Gap, Serum: 10 (09-05 @ 09:54)      Calcium, Total Serum: 9.2 (09-06 @ 06:08)  Calcium, Total Serum: 9.2 (09-05 @ 09:54)  Albumin, Serum: 3.2 *L* (09-06 @ 06:08)  Albumin, Serum: 3.2 *L* (09-05 @ 09:54)    Alanine Aminotransferase (ALT/SGPT): 86 *H* (09-06 @ 06:08)  Alanine Aminotransferase (ALT/SGPT): 116 *H* (09-05 @ 09:54)  Alkaline Phosphatase, Serum: 111 (09-06 @ 06:08)  Alkaline Phosphatase, Serum: 120 (09-05 @ 09:54)  Aspartate Aminotransferase (AST/SGOT): 18 (09-06 @ 06:08)  Aspartate Aminotransferase (AST/SGOT): 21 (09-05 @ 09:54)        09-06    138  |  102  |  26<H>  ----------------------------<  159<H>  4.0   |  23  |  1.31<H>    Ca    9.2      06 Sep 2021 06:08    TPro  6.1  /  Alb  3.2<L>  /  TBili  1.0  /  DBili  x   /  AST  18  /  ALT  86<H>  /  AlkPhos  111  09-06                        8.2    8.59  )-----------( 547      ( 06 Sep 2021 06:08 )             26.0     Medications  MEDICATIONS  (STANDING):  aMIOdarone    Tablet 200 milliGRAM(s) Oral daily  aspirin enteric coated 325 milliGRAM(s) Oral daily  atorvastatin 80 milliGRAM(s) Oral at bedtime  buDESOnide    Inhalation Suspension 0.5 milliGRAM(s) Inhalation two times a day  dextrose 40% Gel 15 Gram(s) Oral once  dextrose 50% Injectable 50 milliLiter(s) IV Push every 15 minutes  dextrose 50% Injectable 25 milliLiter(s) IV Push every 15 minutes  enoxaparin Injectable 40 milliGRAM(s) SubCutaneous daily  finasteride 5 milliGRAM(s) Oral daily  furosemide    Tablet 40 milliGRAM(s) Oral two times a day  gabapentin 300 milliGRAM(s) Oral three times a day  gemfibrozil 600 milliGRAM(s) Oral two times a day  glucagon  Injectable 1 milliGRAM(s) IntraMuscular once  insulin glargine Injectable (LANTUS) 18 Unit(s) SubCutaneous at bedtime  insulin lispro (ADMELOG) corrective regimen sliding scale   SubCutaneous three times a day before meals  insulin lispro (ADMELOG) corrective regimen sliding scale   SubCutaneous at bedtime  insulin lispro Injectable (ADMELOG) 8 Unit(s) SubCutaneous three times a day before meals  metoprolol tartrate 25 milliGRAM(s) Oral two times a day  pantoprazole    Tablet 40 milliGRAM(s) Oral before breakfast  polyethylene glycol 3350 17 Gram(s) Oral daily  senna 2 Tablet(s) Oral at bedtime  spironolactone 25 milliGRAM(s) Oral every 12 hours  tamsulosin 0.4 milliGRAM(s) Oral at bedtime      Physical Exam  General: Patient comfortable in bed  Vital Signs Last 12 Hrs  T(F): 98.4 (09-06-21 @ 04:35), Max: 98.4 (09-06-21 @ 04:35)  HR: 62 (09-06-21 @ 04:35) (62 - 62)  BP: 109/63 (09-06-21 @ 04:35) (109/63 - 109/63)  BP(mean): --  RR: 18 (09-06-21 @ 04:35) (18 - 18)  SpO2: 96% (09-06-21 @ 04:35) (96% - 96%)  Neck: No palpable thyroid nodules.  CVS: S1S2, No murmurs  Respiratory: No wheezing, no crepitations  GI: Abdomen soft, bowel sounds positive  Musculoskeletal:  edema lower extremities.   Skin: No skin rashes, no ecchymosis    Diagnostics

## 2021-09-06 NOTE — PROGRESS NOTE ADULT - PROBLEM SELECTOR PLAN 1
Will continue current insulin regimen for now. Will continue monitoring FS and FU.  Suggest DC on Janumet 50/1000 BID and glipizide 5mg daily AM (discontinue prior oral meds), endo FU 4 weeks.  Counseled for compliance with consistent low carb diet and exercise as tolerated outpatient.  S/P JUANCARLOS petersonal for additional recommendations.

## 2021-09-06 NOTE — PROGRESS NOTE ADULT - PROVIDER SPECIALTY LIST ADULT
CT Surgery
Critical Care
Critical Care
Endocrinology
Critical Care
Critical Care
CT Surgery
CT Surgery
Endocrinology
CT Surgery
Endocrinology
CT Surgery
Endocrinology
Endocrinology
CT Surgery
Endocrinology
CT Surgery
Endocrinology
CT Surgery

## 2021-09-06 NOTE — PROGRESS NOTE ADULT - PROBLEM SELECTOR PROBLEM 4
Obesity
R/O CAD (coronary artery disease)
Obesity
HLD (hyperlipidemia)

## 2021-09-07 ENCOUNTER — NON-APPOINTMENT (OUTPATIENT)
Age: 66
End: 2021-09-07

## 2021-09-07 RX ORDER — ERGOCALCIFEROL 1.25 MG/1
1.25 MG CAPSULE, LIQUID FILLED ORAL
Refills: 0 | Status: ACTIVE | COMMUNITY
Start: 2021-09-07

## 2021-09-07 RX ORDER — PANTOPRAZOLE 40 MG/1
40 TABLET, DELAYED RELEASE ORAL DAILY
Qty: 30 | Refills: 0 | Status: ACTIVE | COMMUNITY
Start: 2021-09-07

## 2021-09-07 RX ORDER — SPIRONOLACTONE 25 MG/1
25 TABLET ORAL DAILY
Refills: 0 | Status: ACTIVE | COMMUNITY
Start: 2021-09-07

## 2021-09-07 RX ORDER — SITAGLIPTIN AND METFORMIN HYDROCHLORIDE 50; 1000 MG/1; MG/1
50-1000 TABLET, FILM COATED ORAL TWICE DAILY
Refills: 0 | Status: ACTIVE | COMMUNITY
Start: 2021-09-07

## 2021-09-07 RX ORDER — ASPIRIN 325 MG/1
325 TABLET ORAL DAILY
Refills: 0 | Status: ACTIVE | COMMUNITY
Start: 2021-09-07

## 2021-09-07 RX ORDER — ACETAMINOPHEN 325 MG/1
325 TABLET ORAL EVERY 6 HOURS
Refills: 0 | Status: ACTIVE | COMMUNITY
Start: 2021-09-07

## 2021-09-07 RX ORDER — TAMSULOSIN HYDROCHLORIDE 0.4 MG/1
0.4 CAPSULE ORAL DAILY
Refills: 0 | Status: ACTIVE | COMMUNITY
Start: 2021-09-07

## 2021-09-08 ENCOUNTER — APPOINTMENT (OUTPATIENT)
Dept: CARE COORDINATION | Facility: HOME HEALTH | Age: 66
End: 2021-09-08
Payer: COMMERCIAL

## 2021-09-08 VITALS
RESPIRATION RATE: 16 BRPM | DIASTOLIC BLOOD PRESSURE: 62 MMHG | SYSTOLIC BLOOD PRESSURE: 112 MMHG | OXYGEN SATURATION: 96 % | WEIGHT: 195 LBS | HEART RATE: 70 BPM

## 2021-09-08 PROCEDURE — 99024 POSTOP FOLLOW-UP VISIT: CPT

## 2021-09-08 RX ORDER — GABAPENTIN 300 MG/1
300 CAPSULE ORAL 3 TIMES DAILY
Refills: 0 | Status: DISCONTINUED | COMMUNITY
Start: 2021-09-07 | End: 2021-09-08

## 2021-09-08 NOTE — HISTORY OF PRESENT ILLNESS
[FreeTextEntry1] : 65M s/p CABG Dr. Valerio \par lives alone, very crowded large home in disarray\par states friends nearby who check in on him\par all questions answered\par

## 2021-09-08 NOTE — PHYSICAL EXAM
[] : no respiratory distress [Respiration, Rhythm And Depth] : normal respiratory rhythm and effort [Auscultation Breath Sounds / Voice Sounds] : lungs were clear to auscultation bilaterally [Heart Rate And Rhythm] : heart rate was normal and rhythm regular [Heart Sounds] : normal S1 and S2 [Bowel Sounds] : normal bowel sounds [Abdomen Soft] : soft [Abdomen Tenderness] : non-tender [FreeTextEntry1] : obese

## 2021-09-08 NOTE — REVIEW OF SYSTEMS
[Lower Ext Edema] : lower extremity edema [SOB on Exertion] : shortness of breath during exertion [Hesitancy] : urinary hesitancy [Negative] : Genitourinary

## 2021-09-09 NOTE — CHART NOTE - NSCHARTNOTEFT_GEN_A_CORE
Patients chart and documents noted:  During hospital course patient underwent CABG on 8/27/21 and postoperatively was noted to acute right ventricular systolic dysfunction and with hemorrhagic shock.  Patient was treated with IV fluid boluses, blood and blood product transfusions and vasopressor therapy.

## 2021-09-10 ENCOUNTER — TRANSCRIPTION ENCOUNTER (OUTPATIENT)
Age: 66
End: 2021-09-10

## 2021-09-10 ENCOUNTER — INPATIENT (INPATIENT)
Facility: HOSPITAL | Age: 66
LOS: 9 days | Discharge: HOME HEALTH | DRG: 863 | End: 2021-09-20
Attending: THORACIC SURGERY (CARDIOTHORACIC VASCULAR SURGERY) | Admitting: THORACIC SURGERY (CARDIOTHORACIC VASCULAR SURGERY)
Payer: MEDICARE

## 2021-09-10 VITALS
SYSTOLIC BLOOD PRESSURE: 127 MMHG | OXYGEN SATURATION: 100 % | RESPIRATION RATE: 18 BRPM | DIASTOLIC BLOOD PRESSURE: 77 MMHG | HEART RATE: 71 BPM | TEMPERATURE: 98 F | HEIGHT: 66 IN | WEIGHT: 169.98 LBS

## 2021-09-10 DIAGNOSIS — Z98.890 OTHER SPECIFIED POSTPROCEDURAL STATES: Chronic | ICD-10-CM

## 2021-09-10 PROCEDURE — 99285 EMERGENCY DEPT VISIT HI MDM: CPT | Mod: GC

## 2021-09-10 NOTE — ED CLERICAL - NS ED CLERK NOTE PRE-ARRIVAL INFORMATION; ADDITIONAL PRE-ARRIVAL INFORMATION
This patient is enrolled in the [facility] Your [facility] program and has undergone a cardiac surgery procedure within the last 30 days and has active care navigation.   This patient can be followed up by the care navigation team within 24 hours. To arrange close follow-up or to obtain additional clinical information about this patient, please call the contact number above.   Please call the cardiac surgery team once patient is registered at [phone] for consultation PRIOR to disposition decision.  The patient recently underwent a cardiac surgery procedure and the team can assist in acute medical management.

## 2021-09-11 DIAGNOSIS — T14.8XXA OTHER INJURY OF UNSPECIFIED BODY REGION, INITIAL ENCOUNTER: ICD-10-CM

## 2021-09-11 DIAGNOSIS — R09.89 OTHER SPECIFIED SYMPTOMS AND SIGNS INVOLVING THE CIRCULATORY AND RESPIRATORY SYSTEMS: ICD-10-CM

## 2021-09-11 DIAGNOSIS — L03.90 CELLULITIS, UNSPECIFIED: ICD-10-CM

## 2021-09-11 DIAGNOSIS — M79.89 OTHER SPECIFIED SOFT TISSUE DISORDERS: ICD-10-CM

## 2021-09-11 DIAGNOSIS — Z95.1 PRESENCE OF AORTOCORONARY BYPASS GRAFT: ICD-10-CM

## 2021-09-11 DIAGNOSIS — E11.9 TYPE 2 DIABETES MELLITUS WITHOUT COMPLICATIONS: ICD-10-CM

## 2021-09-11 DIAGNOSIS — N40.0 BENIGN PROSTATIC HYPERPLASIA WITHOUT LOWER URINARY TRACT SYMPTOMS: ICD-10-CM

## 2021-09-11 LAB
ALBUMIN SERPL ELPH-MCNC: 3.5 G/DL — SIGNIFICANT CHANGE UP (ref 3.3–5)
ALP SERPL-CCNC: 149 U/L — HIGH (ref 40–120)
ALT FLD-CCNC: 32 U/L — SIGNIFICANT CHANGE UP (ref 10–45)
ANION GAP SERPL CALC-SCNC: 11 MMOL/L — SIGNIFICANT CHANGE UP (ref 5–17)
AST SERPL-CCNC: 10 U/L — SIGNIFICANT CHANGE UP (ref 10–40)
BASOPHILS # BLD AUTO: 0.1 K/UL — SIGNIFICANT CHANGE UP (ref 0–0.2)
BASOPHILS NFR BLD AUTO: 1.2 % — SIGNIFICANT CHANGE UP (ref 0–2)
BILIRUB SERPL-MCNC: 0.5 MG/DL — SIGNIFICANT CHANGE UP (ref 0.2–1.2)
BUN SERPL-MCNC: 27 MG/DL — HIGH (ref 7–23)
CALCIUM SERPL-MCNC: 9.2 MG/DL — SIGNIFICANT CHANGE UP (ref 8.4–10.5)
CHLORIDE SERPL-SCNC: 100 MMOL/L — SIGNIFICANT CHANGE UP (ref 96–108)
CO2 SERPL-SCNC: 23 MMOL/L — SIGNIFICANT CHANGE UP (ref 22–31)
CREAT SERPL-MCNC: 1.32 MG/DL — HIGH (ref 0.5–1.3)
CRP SERPL-MCNC: 38 MG/L — HIGH (ref 0–4)
EOSINOPHIL # BLD AUTO: 0.23 K/UL — SIGNIFICANT CHANGE UP (ref 0–0.5)
EOSINOPHIL NFR BLD AUTO: 2.7 % — SIGNIFICANT CHANGE UP (ref 0–6)
GLUCOSE SERPL-MCNC: 289 MG/DL — HIGH (ref 70–99)
HCT VFR BLD CALC: 31.3 % — LOW (ref 39–50)
HGB BLD-MCNC: 9.6 G/DL — LOW (ref 13–17)
IMM GRANULOCYTES NFR BLD AUTO: 0.5 % — SIGNIFICANT CHANGE UP (ref 0–1.5)
LYMPHOCYTES # BLD AUTO: 0.93 K/UL — LOW (ref 1–3.3)
LYMPHOCYTES # BLD AUTO: 11.1 % — LOW (ref 13–44)
MCHC RBC-ENTMCNC: 29.3 PG — SIGNIFICANT CHANGE UP (ref 27–34)
MCHC RBC-ENTMCNC: 30.7 GM/DL — LOW (ref 32–36)
MCV RBC AUTO: 95.4 FL — SIGNIFICANT CHANGE UP (ref 80–100)
MONOCYTES # BLD AUTO: 0.93 K/UL — HIGH (ref 0–0.9)
MONOCYTES NFR BLD AUTO: 11.1 % — SIGNIFICANT CHANGE UP (ref 2–14)
NEUTROPHILS # BLD AUTO: 6.17 K/UL — SIGNIFICANT CHANGE UP (ref 1.8–7.4)
NEUTROPHILS NFR BLD AUTO: 73.4 % — SIGNIFICANT CHANGE UP (ref 43–77)
NRBC # BLD: 0 /100 WBCS — SIGNIFICANT CHANGE UP (ref 0–0)
PLATELET # BLD AUTO: 614 K/UL — HIGH (ref 150–400)
POTASSIUM SERPL-MCNC: 4.7 MMOL/L — SIGNIFICANT CHANGE UP (ref 3.5–5.3)
POTASSIUM SERPL-SCNC: 4.7 MMOL/L — SIGNIFICANT CHANGE UP (ref 3.5–5.3)
PROT SERPL-MCNC: 6.9 G/DL — SIGNIFICANT CHANGE UP (ref 6–8.3)
RBC # BLD: 3.28 M/UL — LOW (ref 4.2–5.8)
RBC # FLD: 15 % — HIGH (ref 10.3–14.5)
SARS-COV-2 RNA SPEC QL NAA+PROBE: SIGNIFICANT CHANGE UP
SODIUM SERPL-SCNC: 134 MMOL/L — LOW (ref 135–145)
WBC # BLD: 8.4 K/UL — SIGNIFICANT CHANGE UP (ref 3.8–10.5)
WBC # FLD AUTO: 8.4 K/UL — SIGNIFICANT CHANGE UP (ref 3.8–10.5)

## 2021-09-11 PROCEDURE — 99222 1ST HOSP IP/OBS MODERATE 55: CPT | Mod: 24

## 2021-09-11 PROCEDURE — 73590 X-RAY EXAM OF LOWER LEG: CPT | Mod: 26,LT

## 2021-09-11 PROCEDURE — 93971 EXTREMITY STUDY: CPT | Mod: 26,LT

## 2021-09-11 RX ORDER — METOPROLOL TARTRATE 50 MG
25 TABLET ORAL EVERY 12 HOURS
Refills: 0 | Status: DISCONTINUED | OUTPATIENT
Start: 2021-09-11 | End: 2021-09-20

## 2021-09-11 RX ORDER — VANCOMYCIN HCL 1 G
1000 VIAL (EA) INTRAVENOUS DAILY
Refills: 0 | Status: DISCONTINUED | OUTPATIENT
Start: 2021-09-12 | End: 2021-09-18

## 2021-09-11 RX ORDER — INSULIN LISPRO 100/ML
VIAL (ML) SUBCUTANEOUS
Refills: 0 | Status: DISCONTINUED | OUTPATIENT
Start: 2021-09-11 | End: 2021-09-14

## 2021-09-11 RX ORDER — CEFUROXIME AXETIL 250 MG
1500 TABLET ORAL ONCE
Refills: 0 | Status: COMPLETED | OUTPATIENT
Start: 2021-09-11 | End: 2021-09-11

## 2021-09-11 RX ORDER — TAMSULOSIN HYDROCHLORIDE 0.4 MG/1
0.4 CAPSULE ORAL AT BEDTIME
Refills: 0 | Status: DISCONTINUED | OUTPATIENT
Start: 2021-09-11 | End: 2021-09-20

## 2021-09-11 RX ORDER — HEPARIN SODIUM 5000 [USP'U]/ML
5000 INJECTION INTRAVENOUS; SUBCUTANEOUS EVERY 8 HOURS
Refills: 0 | Status: DISCONTINUED | OUTPATIENT
Start: 2021-09-11 | End: 2021-09-20

## 2021-09-11 RX ORDER — VANCOMYCIN HCL 1 G
1000 VIAL (EA) INTRAVENOUS ONCE
Refills: 0 | Status: COMPLETED | OUTPATIENT
Start: 2021-09-11 | End: 2021-09-11

## 2021-09-11 RX ORDER — FUROSEMIDE 40 MG
40 TABLET ORAL DAILY
Refills: 0 | Status: DISCONTINUED | OUTPATIENT
Start: 2021-09-11 | End: 2021-09-18

## 2021-09-11 RX ORDER — PANTOPRAZOLE SODIUM 20 MG/1
40 TABLET, DELAYED RELEASE ORAL
Refills: 0 | Status: DISCONTINUED | OUTPATIENT
Start: 2021-09-11 | End: 2021-09-20

## 2021-09-11 RX ORDER — ACETAMINOPHEN 500 MG
650 TABLET ORAL EVERY 6 HOURS
Refills: 0 | Status: DISCONTINUED | OUTPATIENT
Start: 2021-09-11 | End: 2021-09-20

## 2021-09-11 RX ORDER — CEFUROXIME AXETIL 250 MG
TABLET ORAL
Refills: 0 | Status: DISCONTINUED | OUTPATIENT
Start: 2021-09-11 | End: 2021-09-18

## 2021-09-11 RX ORDER — METFORMIN HYDROCHLORIDE 850 MG/1
1000 TABLET ORAL
Refills: 0 | Status: DISCONTINUED | OUTPATIENT
Start: 2021-09-11 | End: 2021-09-12

## 2021-09-11 RX ORDER — CEFUROXIME AXETIL 250 MG
1500 TABLET ORAL EVERY 8 HOURS
Refills: 0 | Status: DISCONTINUED | OUTPATIENT
Start: 2021-09-11 | End: 2021-09-18

## 2021-09-11 RX ORDER — POLYETHYLENE GLYCOL 3350 17 G/17G
17 POWDER, FOR SOLUTION ORAL DAILY
Refills: 0 | Status: DISCONTINUED | OUTPATIENT
Start: 2021-09-11 | End: 2021-09-20

## 2021-09-11 RX ORDER — GEMFIBROZIL 600 MG
600 TABLET ORAL
Refills: 0 | Status: DISCONTINUED | OUTPATIENT
Start: 2021-09-11 | End: 2021-09-20

## 2021-09-11 RX ORDER — INFLUENZA VIRUS VACCINE 15; 15; 15; 15 UG/.5ML; UG/.5ML; UG/.5ML; UG/.5ML
0.5 SUSPENSION INTRAMUSCULAR ONCE
Refills: 0 | Status: DISCONTINUED | OUTPATIENT
Start: 2021-09-11 | End: 2021-09-18

## 2021-09-11 RX ORDER — ATORVASTATIN CALCIUM 80 MG/1
80 TABLET, FILM COATED ORAL AT BEDTIME
Refills: 0 | Status: DISCONTINUED | OUTPATIENT
Start: 2021-09-11 | End: 2021-09-20

## 2021-09-11 RX ORDER — VANCOMYCIN HCL 1 G
VIAL (EA) INTRAVENOUS
Refills: 0 | Status: DISCONTINUED | OUTPATIENT
Start: 2021-09-11 | End: 2021-09-18

## 2021-09-11 RX ORDER — SODIUM CHLORIDE 9 MG/ML
3 INJECTION INTRAMUSCULAR; INTRAVENOUS; SUBCUTANEOUS EVERY 8 HOURS
Refills: 0 | Status: DISCONTINUED | OUTPATIENT
Start: 2021-09-11 | End: 2021-09-20

## 2021-09-11 RX ORDER — ASPIRIN/CALCIUM CARB/MAGNESIUM 324 MG
325 TABLET ORAL DAILY
Refills: 0 | Status: DISCONTINUED | OUTPATIENT
Start: 2021-09-11 | End: 2021-09-20

## 2021-09-11 RX ORDER — INSULIN LISPRO 100/ML
VIAL (ML) SUBCUTANEOUS AT BEDTIME
Refills: 0 | Status: DISCONTINUED | OUTPATIENT
Start: 2021-09-11 | End: 2021-09-14

## 2021-09-11 RX ORDER — GABAPENTIN 400 MG/1
300 CAPSULE ORAL THREE TIMES A DAY
Refills: 0 | Status: DISCONTINUED | OUTPATIENT
Start: 2021-09-11 | End: 2021-09-20

## 2021-09-11 RX ORDER — SENNA PLUS 8.6 MG/1
2 TABLET ORAL AT BEDTIME
Refills: 0 | Status: DISCONTINUED | OUTPATIENT
Start: 2021-09-11 | End: 2021-09-20

## 2021-09-11 RX ORDER — FINASTERIDE 5 MG/1
5 TABLET, FILM COATED ORAL DAILY
Refills: 0 | Status: DISCONTINUED | OUTPATIENT
Start: 2021-09-11 | End: 2021-09-20

## 2021-09-11 RX ORDER — AMIODARONE HYDROCHLORIDE 400 MG/1
200 TABLET ORAL DAILY
Refills: 0 | Status: DISCONTINUED | OUTPATIENT
Start: 2021-09-11 | End: 2021-09-20

## 2021-09-11 RX ORDER — ACETAMINOPHEN 500 MG
975 TABLET ORAL ONCE
Refills: 0 | Status: COMPLETED | OUTPATIENT
Start: 2021-09-11 | End: 2021-09-11

## 2021-09-11 RX ORDER — SPIRONOLACTONE 25 MG/1
25 TABLET, FILM COATED ORAL DAILY
Refills: 0 | Status: DISCONTINUED | OUTPATIENT
Start: 2021-09-11 | End: 2021-09-18

## 2021-09-11 RX ADMIN — Medication 40 MILLIGRAM(S): at 09:16

## 2021-09-11 RX ADMIN — Medication 25 MILLIGRAM(S): at 20:28

## 2021-09-11 RX ADMIN — Medication 975 MILLIGRAM(S): at 03:03

## 2021-09-11 RX ADMIN — Medication 25 MILLIGRAM(S): at 09:16

## 2021-09-11 RX ADMIN — Medication 100 MILLIGRAM(S): at 21:02

## 2021-09-11 RX ADMIN — Medication 100 MILLIGRAM(S): at 17:10

## 2021-09-11 RX ADMIN — SODIUM CHLORIDE 3 MILLILITER(S): 9 INJECTION INTRAMUSCULAR; INTRAVENOUS; SUBCUTANEOUS at 14:36

## 2021-09-11 RX ADMIN — SPIRONOLACTONE 25 MILLIGRAM(S): 25 TABLET, FILM COATED ORAL at 09:18

## 2021-09-11 RX ADMIN — Medication 650 MILLIGRAM(S): at 17:19

## 2021-09-11 RX ADMIN — Medication 4: at 09:14

## 2021-09-11 RX ADMIN — SODIUM CHLORIDE 3 MILLILITER(S): 9 INJECTION INTRAMUSCULAR; INTRAVENOUS; SUBCUTANEOUS at 21:16

## 2021-09-11 RX ADMIN — Medication 4: at 14:35

## 2021-09-11 RX ADMIN — Medication 975 MILLIGRAM(S): at 03:33

## 2021-09-11 RX ADMIN — POLYETHYLENE GLYCOL 3350 17 GRAM(S): 17 POWDER, FOR SOLUTION ORAL at 11:11

## 2021-09-11 RX ADMIN — GABAPENTIN 300 MILLIGRAM(S): 400 CAPSULE ORAL at 09:15

## 2021-09-11 RX ADMIN — Medication 100 MILLIGRAM(S): at 02:18

## 2021-09-11 RX ADMIN — FINASTERIDE 5 MILLIGRAM(S): 5 TABLET, FILM COATED ORAL at 11:11

## 2021-09-11 RX ADMIN — Medication 250 MILLIGRAM(S): at 13:25

## 2021-09-11 RX ADMIN — HEPARIN SODIUM 5000 UNIT(S): 5000 INJECTION INTRAVENOUS; SUBCUTANEOUS at 15:01

## 2021-09-11 RX ADMIN — METFORMIN HYDROCHLORIDE 1000 MILLIGRAM(S): 850 TABLET ORAL at 17:11

## 2021-09-11 RX ADMIN — Medication 100 MILLIGRAM(S): at 09:15

## 2021-09-11 RX ADMIN — HEPARIN SODIUM 5000 UNIT(S): 5000 INJECTION INTRAVENOUS; SUBCUTANEOUS at 21:01

## 2021-09-11 RX ADMIN — AMIODARONE HYDROCHLORIDE 200 MILLIGRAM(S): 400 TABLET ORAL at 09:15

## 2021-09-11 RX ADMIN — GABAPENTIN 300 MILLIGRAM(S): 400 CAPSULE ORAL at 15:01

## 2021-09-11 RX ADMIN — PANTOPRAZOLE SODIUM 40 MILLIGRAM(S): 20 TABLET, DELAYED RELEASE ORAL at 09:16

## 2021-09-11 RX ADMIN — ATORVASTATIN CALCIUM 80 MILLIGRAM(S): 80 TABLET, FILM COATED ORAL at 21:02

## 2021-09-11 RX ADMIN — Medication 650 MILLIGRAM(S): at 17:49

## 2021-09-11 RX ADMIN — SENNA PLUS 2 TABLET(S): 8.6 TABLET ORAL at 21:02

## 2021-09-11 RX ADMIN — Medication 325 MILLIGRAM(S): at 11:11

## 2021-09-11 RX ADMIN — Medication 2: at 17:10

## 2021-09-11 RX ADMIN — Medication 600 MILLIGRAM(S): at 17:15

## 2021-09-11 RX ADMIN — GABAPENTIN 300 MILLIGRAM(S): 400 CAPSULE ORAL at 21:02

## 2021-09-11 RX ADMIN — TAMSULOSIN HYDROCHLORIDE 0.4 MILLIGRAM(S): 0.4 CAPSULE ORAL at 21:02

## 2021-09-11 NOTE — ED PROVIDER NOTE - OBJECTIVE STATEMENT
66M with significant PMHx of triple bypass 8/27/21, T2DM (complicated by peripheral neuropathy), HLD, HTN, BPH presents with LLE swelling, erythema, pain. Onset 2-3 days ago. Worsening. No falls or trauma. States he has 66M with significant PMHx of triple bypass 8/27/21, T2DM (complicated by peripheral neuropathy), HLD, HTN, BPH presents with LLE swelling, erythema, pain. Onset 2-3 days ago. Worsening. No falls or trauma. States he has had some chills. No F/NS/N/V/D/Changes to medications. Endorsing mild bleeding from LLE distal tibial region.

## 2021-09-11 NOTE — H&P ADULT - PROBLEM SELECTOR PLAN 3
On PO agents at home   Will check FS TID premeal/qhs and cover w/ ISS prn   Consistent carbohydrate diet

## 2021-09-11 NOTE — H&P ADULT - NSHPPHYSICALEXAM_GEN_ALL_CORE
General: NAD  HEENT:  NC/AT  Neuro: A&Ox4, speech clear, no focal deficits noted  Respiratory: BS CTA b/l, no wheezes, rales or rhonchi noted  Cardiovascular: RRR, (+) S1/S2, no murmur appreciated  Incision: MSI YAYO - CDI, stable   GI: Abd soft, NT/ND, (+) BSx4Q (+) BM - last 9/10  Peripheral Vascular:  PPP b/l, b/l LE edema L > R - LLE 1-2+ pitting edema, L SVG site YAYO - CDI, minimal erythema at distal portion of wound, no fluctuance or induration noted   Musculoskeletal: B/L UE and LE 5/5 strength   Psychiatric: Normal mood, normal affect observed  Skin: (+) L shin erythematous, minimal erythema distal to L SVG site as noted above, 4-5 cm wound ?necrotic on L anterior shin - minimal sanguinous drainage noted, no purulence

## 2021-09-11 NOTE — PROGRESS NOTE ADULT - PROBLEM SELECTOR PLAN 3
LLE duplex 9/10: negative for DVT, (+) L greater saphenous vein thrombosis - likely residual as pt had L SVG harvest  No indication for AC as per Dr. Valerio  F/u BCx x 2  Continue Zinacef 1500 mg IV q8h as ppx as per Dr. Valerio   Vancomycin 1g Q 24 check trough with 3rd dose  Continue to monitor

## 2021-09-11 NOTE — H&P ADULT - PROBLEM SELECTOR PLAN 1
LLE duplex 9/10: negative for DVT, (+) L greater saphenous vein thrombosis - likely residual as pt had L SVG harvest  No indication for AC as per Dr. Valerio  F/u BCx x 2  Continue Zinacef 1500 mg IV q8h as ppx as per Dr. Valerio   Continue to monitor

## 2021-09-11 NOTE — H&P ADULT - ASSESSMENT
64 y/o M w/ PMHx of T2DM c/b peripheral neuropathy, HLD, HTN, BPH, and CAD s/p C3L w/ Dr. Valerio 8/27/21 (d/c'd home 9/6) presents to [facility] ED c/o worsening LLE redness, swelling, and pain x 3-4 days. He states that his leg started feeling "hard" and warm today, which prompted him to call the CTS answering service. After d/w Dr. Valerio, decision was made for the pt to come to the ED for further evaluation and likely admission for further workup and management. Additionally, pt reports having an open wound on his L shin that has started to have bloody drainage since he was d/c'd home.     Pt seen and evaluated on arrival to ED - VSS, NAD. Afebrile, no leukocytosis, nontoxic appearing. XR tib/fib showed no cortical erosions or gas tracking. LLE duplex negative for DVT, (+) L greater saphenous vein thrombosis - likely residual as pt had L SVG harvest for CABG, no indication for AC as per Dr. Valerio. BCx x 2 sent in ED - will f/u. D/w Dr. Valerio - will admit pt for further workup and management. Per Dr. Valerio, pt started on Zinacef for ppx - 1st dose given in ED. Will otherwise resume home medication regimen.

## 2021-09-11 NOTE — H&P ADULT - NSHPSOCIALHISTORY_GEN_ALL_CORE
Lives with spouse   Retired    Occasional social EtOH   Denies tobacco or illicit drug use   Prior to CABG did not require assistive devices at home - post-op has needed to use a roller walker to assist in ambulation

## 2021-09-11 NOTE — ED ADULT NURSE NOTE - OBJECTIVE STATEMENT
66 year old male with a PMH triple bypass on 8/27, CAD, HTN, HLD and DM comes to the ED c/o left leg redness, swelling and pain beginning s/p bypass procedure. Pt is a/ox4, VSS, sensory/motor function intact, speaking coherently, follows commands and c/o 6/10 pain on the pain scale. Left lower extremity noted to be reddened, swollen, hot to touch at incision site. Small abrasion noted to medial side of left shin, no drainage/puss noted. Lung sounds are clear and equal b/l with no labored breathing noted. Abdomen is soft, nontender and nondistended. Peripheral pulses are strong and equal b/l with swelling noted to LLE. Skin is warm, dry and intact. Pt denies CP/SOB, f/c, n/v/d, dizziness/lightheadedness, numbness/tingling/weakness, abdominal pain, back pain, hematuria/dysuria/frequency at this time.

## 2021-09-11 NOTE — H&P ADULT - HISTORY OF PRESENT ILLNESS
66 y/o M w/ PMHx of T2DM c/b peripheral neuropathy, HLD, HTN, BPH, and CAD s/p C3L w/ Dr. Valerio 8/27/21 presents to [facility] ED  64 y/o M w/ PMHx of T2DM c/b peripheral neuropathy, HLD, HTN, BPH, and CAD s/p C3L w/ Dr. Valerio 8/27/21 presents to [facility] ED c/o LLE redness, swelling, and pain x 3-4 days. Pt was d/c'd home on 9/6 after undergoing a CABG w/ L SVG harvest. He states he had mild edema in his b/l LE when he went home, but the following day the LLE started to become more swollen, painful, and erythematous. Pt reports that he tried elevating the leg without relief. He states that his leg started feeling "hard" and warm today, which prompted him to call the CTS answering service. After d/w Dr. Valerio, decision was made for the pt to come to the ED for further evaluation and likely admission for further workup and management. Additionally, pt reports having an open wound on his L shin that has started to have bloody drainage since he was d/c'd home. Denies fever, chills, chest pain, SOB, numbness, tingling, weakness.

## 2021-09-11 NOTE — PROGRESS NOTE ADULT - SUBJECTIVE AND OBJECTIVE BOX
S:  c/o left lower ext swelling and pain    Telemetry:  SR 60's    Vital Signs Last 24 Hrs  T(C): 36.9 (09-11-21 @ 08:11), Max: 37.4 (09-11-21 @ 06:04)  T(F): 98.4 (09-11-21 @ 08:11), Max: 99.3 (09-11-21 @ 06:04)  HR: 68 (09-11-21 @ 08:11) (67 - 71)  BP: 123/68 (09-11-21 @ 08:11) (120/60 - 130/60)  RR: 18 (09-11-21 @ 08:11) (18 - 18)  SpO2: 99% (09-11-21 @ 08:11) (99% - 100%)                       Daily Height in cm: 167.64 (11 Sep 2021 08:11)    Daily         CAPILLARY BLOOD GLUCOSE      POCT Blood Glucose.: 234 mg/dL (11 Sep 2021 12:21)          Drains:     MS         [  ] Drainage:                 L Pleural  [  ]  Drainage:                R Pleural  [  ]  Drainage:    Pacing Wires        [  ]   Settings:                                  Isolated  [  ]    Coumadin    [ ] YES          [  ]      NO         Reason:                                 9.6    8.40  )-----------( 614      ( 11 Sep 2021 02:35 )             31.3       09-11    134<L>  |  100  |  27<H>  ----------------------------<  289<H>  4.7   |  23  |  1.32<H>    Ca    9.2      11 Sep 2021 02:35    TPro  6.9  /  Alb  3.5  /  TBili  0.5  /  DBili  x   /  AST  10  /  ALT  32  /  AlkPhos  149<H>  09-11          PHYSICAL EXAM:    Neurology: alert and oriented x 3, nonfocal, no gross deficits    CV :  S1S2 heard RRR    Sternal Wound :  CDI , Stable    Lungs clear to ausc.  no w/r/r    Abdomen: soft, nontender, nondistended, positive bowel sounds, last bowel movement            Extremities:   LLE edema lower incision of SVG site open serosanguinous drainage + erythema no pus            acetaminophen   Tablet .. 650 milliGRAM(s) Oral every 6 hours PRN  aMIOdarone    Tablet 200 milliGRAM(s) Oral daily  aspirin enteric coated 325 milliGRAM(s) Oral daily  atorvastatin 80 milliGRAM(s) Oral at bedtime  cefuroxime  IVPB      cefuroxime  IVPB 1500 milliGRAM(s) IV Intermittent every 8 hours  finasteride 5 milliGRAM(s) Oral daily  furosemide    Tablet 40 milliGRAM(s) Oral daily  gabapentin 300 milliGRAM(s) Oral three times a day  gemfibrozil 600 milliGRAM(s) Oral two times a day  heparin   Injectable 5000 Unit(s) SubCutaneous every 8 hours  insulin lispro (ADMELOG) corrective regimen sliding scale   SubCutaneous three times a day before meals  insulin lispro (ADMELOG) corrective regimen sliding scale   SubCutaneous at bedtime  metoprolol tartrate 25 milliGRAM(s) Oral every 12 hours  pantoprazole    Tablet 40 milliGRAM(s) Oral before breakfast  polyethylene glycol 3350 17 Gram(s) Oral daily  senna 2 Tablet(s) Oral at bedtime  sodium chloride 0.9% lock flush 3 milliLiter(s) IV Push every 8 hours  spironolactone 25 milliGRAM(s) Oral daily  tamsulosin 0.4 milliGRAM(s) Oral at bedtime  vancomycin  IVPB                                  Physical Therapy Rec:   Home  [ x ]   Home w/ PT  [  ]  Rehab  [  ]    Discussed with Cardiothoracic Team at AM rounds.

## 2021-09-11 NOTE — H&P ADULT - NSHPREVIEWOFSYSTEMS_GEN_ALL_CORE
REVIEW OF SYSTEMS:  CONSTITUTIONAL: Denies fever, weight loss or fatigue  EYES: Denies eye pain, visual disturbances, or discharge  ENMT:  Denies difficulty hearing, tinnitus, vertigo, sinus or throat pain  NECK: Denies pain or stiffness  RESPIRATORY: Denies cough, wheezing, chills, hemoptysis or shortness of breath  CARDIOVASCULAR: (+) b/l LE swelling L > R, Denies chest pain, palpitations, dizziness  GASTROINTESTINAL: Denies abdominal or epigastric pain, nausea, vomiting, hematemesis, diarrhea or melena  GENITOURINARY: Denies dysuria, frequency, hematuria, or incontinence  NEUROLOGICAL: Denies headaches, memory loss, loss of strength, numbness or tremors  SKIN: (+) LLE redness, (+) wound w/ bloody drainage on LLE, Denies itching, burning, rashes, or lesions   LYMPH NODES: Denies enlarged glands  ENDOCRINE: Denies heat or cold intolerance or hair loss  MUSCULOSKELETAL: (+) LLE pain, Denies joint pain or swelling, muscle, back  PSYCHIATRIC: Denies depression, anxiety, mood swings or difficulty sleeping  HEME/LYMPH: Denies easy bruising or bleeding gums  ALLERGY: Denies hives or eczema

## 2021-09-11 NOTE — H&P ADULT - PROBLEM SELECTOR PLAN 2
Continue post-op care   Continue  mg QD, Lopressor 25 mg q12h, and Atorvastatin 80 mg qhs   Continue Amio 200 mg QD   Continue gentle diuresis w/ Lasix 40 mg PO QD & Aldactone 25 mg QD   Increase activity as tolerated, ambulate 4x daily   Encourage incentive spirometry   Wound care and assessment

## 2021-09-11 NOTE — ED PROVIDER NOTE - NS ED ROS FT
Gen: No F/NS, +chills   Head: No falls/head trauma  Eyes: No changes in vision   Resp: No cough or trouble breathing  Cardiovascular: No chest pain or palpitation  Gastroenteric: No N/V/D  :  No change in urine output   MS: +LLE pain   Neuro: No headache  Skin: No new rash

## 2021-09-11 NOTE — ED PROVIDER NOTE - ATTENDING CONTRIBUTION TO CARE
pt is a 67 y/o male s/p cabg last week here with l leg pain and swelling, warmth and redness likely cellulitis, seen by CT surgery for films, labs, us, r/o dvt as well, abx started, vss, no c/o of cp or sob at rest, cxr clear likely admission to ct surgery. pt is a 65 y/o male 65M, significant PMHx of T2DM, HLD, HTN, BPH s/p cabg last week here with l leg pain and swelling, warmth and redness likely cellulitis, seen by CT surgery for films, labs, us, r/o dvt as well, abx started, vss, no c/o of cp or sob at rest, cxr clear likely admission to ct surgery.

## 2021-09-11 NOTE — ED ADULT NURSE NOTE - NSIMPLEMENTINTERV_GEN_ALL_ED
Implemented All Universal Safety Interventions:  [place] to call system. Call bell, personal items and telephone within reach. Instruct patient to call for assistance. Room bathroom lighting operational. Non-slip footwear when patient is off stretcher. Physically safe environment: no spills, clutter or unnecessary equipment. Stretcher in lowest position, wheels locked, appropriate side rails in place.

## 2021-09-11 NOTE — H&P ADULT - NSHPLABSRESULTS_GEN_ALL_CORE
9.6    8.40  )-----------( 614      ( 11 Sep 2021 02:35 )             31.3     09-11    134<L>  |  100  |  27<H>  ----------------------------<  289<H>  4.7   |  23  |  1.32<H>    Ca    9.2      11 Sep 2021 02:35    TPro  6.9  /  Alb  3.5  /  TBili  0.5  /  DBili  x   /  AST  10  /  ALT  32  /  AlkPhos  149<H>  09-11    Xray Tibia + Fibula 2 Views, Left (09.11.21 @ 02:44): ******PRELIMINARY REPORT******  INTERPRETATION:  No cortical erosions or gas tracking.    VA Duplex Lower Ext Vein Scan, Left (09.11.21 @ 04:55): IMPRESSION: No obvious deep vein thrombosis in the left lower extremity. Left greater saphenous vein thrombosis.

## 2021-09-11 NOTE — PROGRESS NOTE ADULT - ASSESSMENT
66 y/o M w/ PMHx of T2DM c/b peripheral neuropathy, HLD, HTN, BPH, and CAD s/p C3L w/ Dr. Valerio 8/27/21 (d/c'd home 9/6) presents to [facility] ED c/o worsening LLE redness, swelling, and pain x 3-4 days. He states that his leg started feeling "hard" and warm today, which prompted him to call the CTS answering service. After d/w Dr. Valerio, decision was made for the pt to come to the ED for further evaluation and likely admission for further workup and management. Additionally, pt reports having an open wound on his L shin that has started to have bloody drainage since he was d/c'd home.     Pt seen and evaluated on arrival to ED - VSS, NAD. Afebrile, no leukocytosis, nontoxic appearing. XR tib/fib showed no cortical erosions or gas tracking. LLE duplex negative for DVT, (+) L greater saphenous vein thrombosis - likely residual as pt had L SVG harvest for CABG, no indication for AC as per Dr. Valerio. BCx x 2 sent in ED - will f/u. D/w Dr. Valerio - will admit pt for further workup and management. Per Dr. Valerio, pt started on Zinacef for ppx - 1st dose given in ED. Will otherwise resume home medication regimen.   9/11 HD stable SR 60's.  Vancomycin added.  Continue zinacef.  Ace wrap LLE.  Neg for DVT

## 2021-09-12 LAB
ANION GAP SERPL CALC-SCNC: 14 MMOL/L — SIGNIFICANT CHANGE UP (ref 5–17)
BUN SERPL-MCNC: 20 MG/DL — SIGNIFICANT CHANGE UP (ref 7–23)
CALCIUM SERPL-MCNC: 9.1 MG/DL — SIGNIFICANT CHANGE UP (ref 8.4–10.5)
CHLORIDE SERPL-SCNC: 101 MMOL/L — SIGNIFICANT CHANGE UP (ref 96–108)
CO2 SERPL-SCNC: 21 MMOL/L — LOW (ref 22–31)
COVID-19 SPIKE DOMAIN AB INTERP: POSITIVE
COVID-19 SPIKE DOMAIN ANTIBODY RESULT: >250 U/ML — HIGH
CREAT SERPL-MCNC: 1.03 MG/DL — SIGNIFICANT CHANGE UP (ref 0.5–1.3)
GLUCOSE SERPL-MCNC: 196 MG/DL — HIGH (ref 70–99)
HCT VFR BLD CALC: 28.1 % — LOW (ref 39–50)
HCV AB S/CO SERPL IA: 1.21 S/CO — HIGH (ref 0–0.99)
HCV AB SERPL-IMP: ABNORMAL
HGB BLD-MCNC: 8.8 G/DL — LOW (ref 13–17)
MCHC RBC-ENTMCNC: 28.7 PG — SIGNIFICANT CHANGE UP (ref 27–34)
MCHC RBC-ENTMCNC: 31.3 GM/DL — LOW (ref 32–36)
MCV RBC AUTO: 91.5 FL — SIGNIFICANT CHANGE UP (ref 80–100)
NRBC # BLD: 0 /100 WBCS — SIGNIFICANT CHANGE UP (ref 0–0)
PLATELET # BLD AUTO: 573 K/UL — HIGH (ref 150–400)
POTASSIUM SERPL-MCNC: 4.2 MMOL/L — SIGNIFICANT CHANGE UP (ref 3.5–5.3)
POTASSIUM SERPL-SCNC: 4.2 MMOL/L — SIGNIFICANT CHANGE UP (ref 3.5–5.3)
RBC # BLD: 3.07 M/UL — LOW (ref 4.2–5.8)
RBC # FLD: 14.9 % — HIGH (ref 10.3–14.5)
SARS-COV-2 IGG+IGM SERPL QL IA: >250 U/ML — HIGH
SARS-COV-2 IGG+IGM SERPL QL IA: POSITIVE
SODIUM SERPL-SCNC: 136 MMOL/L — SIGNIFICANT CHANGE UP (ref 135–145)
VANCOMYCIN TROUGH SERPL-MCNC: <4 UG/ML — LOW (ref 10–20)
WBC # BLD: 6.32 K/UL — SIGNIFICANT CHANGE UP (ref 3.8–10.5)
WBC # FLD AUTO: 6.32 K/UL — SIGNIFICANT CHANGE UP (ref 3.8–10.5)

## 2021-09-12 PROCEDURE — 93010 ELECTROCARDIOGRAM REPORT: CPT

## 2021-09-12 PROCEDURE — 99232 SBSQ HOSP IP/OBS MODERATE 35: CPT | Mod: 24

## 2021-09-12 RX ORDER — INSULIN LISPRO 100/ML
4 VIAL (ML) SUBCUTANEOUS ONCE
Refills: 0 | Status: COMPLETED | OUTPATIENT
Start: 2021-09-12 | End: 2021-09-12

## 2021-09-12 RX ORDER — DEXTROSE 50 % IN WATER 50 %
25 SYRINGE (ML) INTRAVENOUS ONCE
Refills: 0 | Status: DISCONTINUED | OUTPATIENT
Start: 2021-09-12 | End: 2021-09-20

## 2021-09-12 RX ORDER — INSULIN GLARGINE 100 [IU]/ML
6 INJECTION, SOLUTION SUBCUTANEOUS AT BEDTIME
Refills: 0 | Status: DISCONTINUED | OUTPATIENT
Start: 2021-09-12 | End: 2021-09-14

## 2021-09-12 RX ORDER — SODIUM CHLORIDE 9 MG/ML
1000 INJECTION, SOLUTION INTRAVENOUS
Refills: 0 | Status: DISCONTINUED | OUTPATIENT
Start: 2021-09-12 | End: 2021-09-20

## 2021-09-12 RX ORDER — INSULIN LISPRO 100/ML
4 VIAL (ML) SUBCUTANEOUS
Refills: 0 | Status: DISCONTINUED | OUTPATIENT
Start: 2021-09-12 | End: 2021-09-14

## 2021-09-12 RX ORDER — DEXTROSE 50 % IN WATER 50 %
12.5 SYRINGE (ML) INTRAVENOUS ONCE
Refills: 0 | Status: DISCONTINUED | OUTPATIENT
Start: 2021-09-12 | End: 2021-09-20

## 2021-09-12 RX ORDER — GLUCAGON INJECTION, SOLUTION 0.5 MG/.1ML
1 INJECTION, SOLUTION SUBCUTANEOUS ONCE
Refills: 0 | Status: DISCONTINUED | OUTPATIENT
Start: 2021-09-12 | End: 2021-09-20

## 2021-09-12 RX ORDER — DEXTROSE 50 % IN WATER 50 %
15 SYRINGE (ML) INTRAVENOUS ONCE
Refills: 0 | Status: DISCONTINUED | OUTPATIENT
Start: 2021-09-12 | End: 2021-09-20

## 2021-09-12 RX ADMIN — GABAPENTIN 300 MILLIGRAM(S): 400 CAPSULE ORAL at 13:00

## 2021-09-12 RX ADMIN — HEPARIN SODIUM 5000 UNIT(S): 5000 INJECTION INTRAVENOUS; SUBCUTANEOUS at 13:01

## 2021-09-12 RX ADMIN — SODIUM CHLORIDE 3 MILLILITER(S): 9 INJECTION INTRAMUSCULAR; INTRAVENOUS; SUBCUTANEOUS at 21:04

## 2021-09-12 RX ADMIN — Medication 250 MILLIGRAM(S): at 12:56

## 2021-09-12 RX ADMIN — Medication 4 UNIT(S): at 13:01

## 2021-09-12 RX ADMIN — POLYETHYLENE GLYCOL 3350 17 GRAM(S): 17 POWDER, FOR SOLUTION ORAL at 13:00

## 2021-09-12 RX ADMIN — SODIUM CHLORIDE 3 MILLILITER(S): 9 INJECTION INTRAMUSCULAR; INTRAVENOUS; SUBCUTANEOUS at 05:59

## 2021-09-12 RX ADMIN — Medication 100 MILLIGRAM(S): at 21:38

## 2021-09-12 RX ADMIN — Medication 25 MILLIGRAM(S): at 05:24

## 2021-09-12 RX ADMIN — METFORMIN HYDROCHLORIDE 1000 MILLIGRAM(S): 850 TABLET ORAL at 08:12

## 2021-09-12 RX ADMIN — FINASTERIDE 5 MILLIGRAM(S): 5 TABLET, FILM COATED ORAL at 13:00

## 2021-09-12 RX ADMIN — Medication 325 MILLIGRAM(S): at 13:00

## 2021-09-12 RX ADMIN — SPIRONOLACTONE 25 MILLIGRAM(S): 25 TABLET, FILM COATED ORAL at 05:23

## 2021-09-12 RX ADMIN — ATORVASTATIN CALCIUM 80 MILLIGRAM(S): 80 TABLET, FILM COATED ORAL at 21:38

## 2021-09-12 RX ADMIN — Medication 100 MILLIGRAM(S): at 05:22

## 2021-09-12 RX ADMIN — HEPARIN SODIUM 5000 UNIT(S): 5000 INJECTION INTRAVENOUS; SUBCUTANEOUS at 21:38

## 2021-09-12 RX ADMIN — SODIUM CHLORIDE 3 MILLILITER(S): 9 INJECTION INTRAMUSCULAR; INTRAVENOUS; SUBCUTANEOUS at 05:39

## 2021-09-12 RX ADMIN — INSULIN GLARGINE 6 UNIT(S): 100 INJECTION, SOLUTION SUBCUTANEOUS at 21:37

## 2021-09-12 RX ADMIN — TAMSULOSIN HYDROCHLORIDE 0.4 MILLIGRAM(S): 0.4 CAPSULE ORAL at 21:37

## 2021-09-12 RX ADMIN — Medication 25 MILLIGRAM(S): at 17:13

## 2021-09-12 RX ADMIN — SENNA PLUS 2 TABLET(S): 8.6 TABLET ORAL at 21:37

## 2021-09-12 RX ADMIN — Medication 600 MILLIGRAM(S): at 17:13

## 2021-09-12 RX ADMIN — Medication 4 UNIT(S): at 17:14

## 2021-09-12 RX ADMIN — SODIUM CHLORIDE 3 MILLILITER(S): 9 INJECTION INTRAMUSCULAR; INTRAVENOUS; SUBCUTANEOUS at 13:07

## 2021-09-12 RX ADMIN — PANTOPRAZOLE SODIUM 40 MILLIGRAM(S): 20 TABLET, DELAYED RELEASE ORAL at 05:23

## 2021-09-12 RX ADMIN — Medication 5 MILLIGRAM(S): at 10:21

## 2021-09-12 RX ADMIN — GABAPENTIN 300 MILLIGRAM(S): 400 CAPSULE ORAL at 21:38

## 2021-09-12 RX ADMIN — Medication 4: at 12:22

## 2021-09-12 RX ADMIN — Medication 2: at 17:13

## 2021-09-12 RX ADMIN — AMIODARONE HYDROCHLORIDE 200 MILLIGRAM(S): 400 TABLET ORAL at 05:23

## 2021-09-12 RX ADMIN — GABAPENTIN 300 MILLIGRAM(S): 400 CAPSULE ORAL at 05:23

## 2021-09-12 RX ADMIN — HEPARIN SODIUM 5000 UNIT(S): 5000 INJECTION INTRAVENOUS; SUBCUTANEOUS at 05:22

## 2021-09-12 RX ADMIN — Medication 2: at 08:12

## 2021-09-12 RX ADMIN — Medication 40 MILLIGRAM(S): at 05:23

## 2021-09-12 RX ADMIN — Medication 100 MILLIGRAM(S): at 14:05

## 2021-09-12 RX ADMIN — Medication 600 MILLIGRAM(S): at 05:23

## 2021-09-12 NOTE — PROGRESS NOTE ADULT - SUBJECTIVE AND OBJECTIVE BOX
Subjective:  "Hello"  OOB chair      Tele:  SR 60s    EKG NSR 76 QTC  480       V/S:                    T(F): 98.8 (21 @ 04:31), Max: 98.8 (21 @ 04:31)  HR: 65 (21 @ 04:31) (59 - 65)  BP: 112/65 (21 @ 04:31) (107/72 - 116/69)  RR: 18 (21 @ 04:31) (18 - 18)  SpO2: 97% (21 @ 04:31) (97% - 100%)  Wt(kg): --      LV EF:      Labs:      136  |  101  |  20  ----------------------------<  196<H>  4.2   |  21<L>  |  1.03    Ca    9.1      12 Sep 2021 06:18    TPro  6.9  /  Alb  3.5  /  TBili  0.5  /  DBili  x   /  AST  10  /  ALT  32  /  AlkPhos  149<H>                                 8.8    6.32  )-----------( 573      ( 12 Sep 2021 06:18 )             28.1             CAPILLARY BLOOD GLUCOSE      POCT Blood Glucose.: 241 mg/dL (12 Sep 2021 12:20)  POCT Blood Glucose.: 196 mg/dL (12 Sep 2021 08:07)  POCT Blood Glucose.: 137 mg/dL (11 Sep 2021 21:23)  POCT Blood Glucose.: 195 mg/dL (11 Sep 2021 16:21)           CXR:    I&O's Detail    11 Sep 2021 07:01  -  12 Sep 2021 07:00  --------------------------------------------------------  IN:    IV PiggyBack: 350 mL    Oral Fluid: 480 mL  Total IN: 830 mL    OUT:    Voided (mL): 2350 mL  Total OUT: 2350 mL    Total NET: -1520 mL      12 Sep 2021 07:01  -  12 Sep 2021 12:53  --------------------------------------------------------  IN:    Oral Fluid: 340 mL  Total IN: 340 mL    OUT:    Voided (mL): 1300 mL  Total OUT: 1300 mL    Total NET: -960 mL      BOWEL MOVEMENT:  [ ] YES [x ] NO      Daily     Daily Weight in k.9 (12 Sep 2021 07:22)  Medications:  acetaminophen   Tablet .. 650 milliGRAM(s) Oral every 6 hours PRN  aMIOdarone    Tablet 200 milliGRAM(s) Oral daily  aspirin enteric coated 325 milliGRAM(s) Oral daily  atorvastatin 80 milliGRAM(s) Oral at bedtime  cefuroxime  IVPB      cefuroxime  IVPB 1500 milliGRAM(s) IV Intermittent every 8 hours  dextrose 40% Gel 15 Gram(s) Oral once  dextrose 5%. 1000 milliLiter(s) IV Continuous <Continuous>  dextrose 5%. 1000 milliLiter(s) IV Continuous <Continuous>  dextrose 50% Injectable 25 Gram(s) IV Push once  dextrose 50% Injectable 12.5 Gram(s) IV Push once  dextrose 50% Injectable 25 Gram(s) IV Push once  finasteride 5 milliGRAM(s) Oral daily  furosemide    Tablet 40 milliGRAM(s) Oral daily  gabapentin 300 milliGRAM(s) Oral three times a day  gemfibrozil 600 milliGRAM(s) Oral two times a day  glucagon  Injectable 1 milliGRAM(s) IntraMuscular once  heparin   Injectable 5000 Unit(s) SubCutaneous every 8 hours  influenza   Vaccine 0.5 milliLiter(s) IntraMuscular once  insulin glargine Injectable (LANTUS) 6 Unit(s) SubCutaneous at bedtime  insulin lispro (ADMELOG) corrective regimen sliding scale   SubCutaneous three times a day before meals  insulin lispro (ADMELOG) corrective regimen sliding scale   SubCutaneous at bedtime  insulin lispro Injectable (ADMELOG) 4 Unit(s) SubCutaneous before breakfast  insulin lispro Injectable (ADMELOG) 4 Unit(s) SubCutaneous before lunch  insulin lispro Injectable (ADMELOG) 4 Unit(s) SubCutaneous before dinner  insulin lispro Injectable (ADMELOG). 4 Unit(s) SubCutaneous once  metoprolol tartrate 25 milliGRAM(s) Oral every 12 hours  pantoprazole    Tablet 40 milliGRAM(s) Oral before breakfast  polyethylene glycol 3350 17 Gram(s) Oral daily  senna 2 Tablet(s) Oral at bedtime  sodium chloride 0.9% lock flush 3 milliLiter(s) IV Push every 8 hours  spironolactone 25 milliGRAM(s) Oral daily  tamsulosin 0.4 milliGRAM(s) Oral at bedtime  vancomycin  IVPB      vancomycin  IVPB 1000 milliGRAM(s) IV Intermittent daily        Physical Exam:    Neurology: alert and oriented x 3    CV :  S1S2 heard RRR    Sternal Wound :  CDI , Stable    Lungs clear to ausc.  no w/r/r    Abdomen: soft, nontender, nondistended, positive bowel sounds, last bowel movement    3 days ago        Extremities:   LLE edema lower incision of SVG site open serosanguinous drainage + erythema no pus                     PAST MEDICAL & SURGICAL HISTORY:  T2DM (type 2 diabetes mellitus)  c/b peripheral neuropathy    HTN (hypertension)    HLD (hyperlipidemia)    BPH (benign prostatic hyperplasia)    H/O shoulder surgery  right    H/O arthroscopy of knee    S/P arthroscopy of right knee

## 2021-09-12 NOTE — PROGRESS NOTE ADULT - ASSESSMENT
64 y/o M w/ PMHx of T2DM c/b peripheral neuropathy, HLD, HTN, BPH, and CAD s/p C3L w/ Dr. Valerio 8/27/21 (d/c'd home 9/6) presents to [facility] ED c/o worsening LLE redness, swelling, and pain x 3-4 days. He states that his leg started feeling "hard" and warm today, which prompted him to call the CTS answering service. After d/w Dr. Valerio, decision was made for the pt to come to the ED for further evaluation and likely admission for further workup and management. Additionally, pt reports having an open wound on his L shin that has started to have bloody drainage since he was d/c'd home.     Pt seen and evaluated on arrival to ED - VSS, NAD. Afebrile, no leukocytosis, nontoxic appearing. XR tib/fib showed no cortical erosions or gas tracking. LLE duplex negative for DVT, (+) L greater saphenous vein thrombosis - likely residual as pt had L SVG harvest for CABG, no indication for AC as per Dr. Valerio. BCx x 2 sent in ED - will f/u. D/w Dr. Valerio - will admit pt for further workup and management. Per Dr. Valerio, pt started on Zinacef for ppx - 1st dose given in ED. Will otherwise resume home medication regimen.   9/11 HD stable SR 60's.  Vancomycin added.  Continue zinacef.  Ace wrap LLE.  Neg for DVT  9/12 Premeal/lantus added  Vanco/Zinacef LLE cellulitis>Vanco level after 4th dose

## 2021-09-12 NOTE — PROGRESS NOTE ADULT - PROBLEM SELECTOR PLAN 1
FS Q4  SSI  Premeal /lantus initiated (oral agents at home) Diabetic Diet  SSI  Premeal /lantus initiated (oral agents at home)

## 2021-09-13 LAB
ANION GAP SERPL CALC-SCNC: 12 MMOL/L — SIGNIFICANT CHANGE UP (ref 5–17)
BUN SERPL-MCNC: 25 MG/DL — HIGH (ref 7–23)
CALCIUM SERPL-MCNC: 9.2 MG/DL — SIGNIFICANT CHANGE UP (ref 8.4–10.5)
CHLORIDE SERPL-SCNC: 102 MMOL/L — SIGNIFICANT CHANGE UP (ref 96–108)
CO2 SERPL-SCNC: 21 MMOL/L — LOW (ref 22–31)
CREAT SERPL-MCNC: 1.08 MG/DL — SIGNIFICANT CHANGE UP (ref 0.5–1.3)
GLUCOSE SERPL-MCNC: 182 MG/DL — HIGH (ref 70–99)
HCT VFR BLD CALC: 27.2 % — LOW (ref 39–50)
HGB BLD-MCNC: 8.7 G/DL — LOW (ref 13–17)
MCHC RBC-ENTMCNC: 29 PG — SIGNIFICANT CHANGE UP (ref 27–34)
MCHC RBC-ENTMCNC: 32 GM/DL — SIGNIFICANT CHANGE UP (ref 32–36)
MCV RBC AUTO: 90.7 FL — SIGNIFICANT CHANGE UP (ref 80–100)
NRBC # BLD: 0 /100 WBCS — SIGNIFICANT CHANGE UP (ref 0–0)
PLATELET # BLD AUTO: 561 K/UL — HIGH (ref 150–400)
POTASSIUM SERPL-MCNC: 4 MMOL/L — SIGNIFICANT CHANGE UP (ref 3.5–5.3)
POTASSIUM SERPL-SCNC: 4 MMOL/L — SIGNIFICANT CHANGE UP (ref 3.5–5.3)
RBC # BLD: 3 M/UL — LOW (ref 4.2–5.8)
RBC # FLD: 14.8 % — HIGH (ref 10.3–14.5)
SODIUM SERPL-SCNC: 135 MMOL/L — SIGNIFICANT CHANGE UP (ref 135–145)
WBC # BLD: 5.77 K/UL — SIGNIFICANT CHANGE UP (ref 3.8–10.5)
WBC # FLD AUTO: 5.77 K/UL — SIGNIFICANT CHANGE UP (ref 3.8–10.5)

## 2021-09-13 PROCEDURE — 99232 SBSQ HOSP IP/OBS MODERATE 35: CPT | Mod: 24

## 2021-09-13 PROCEDURE — 93010 ELECTROCARDIOGRAM REPORT: CPT

## 2021-09-13 RX ADMIN — INSULIN GLARGINE 6 UNIT(S): 100 INJECTION, SOLUTION SUBCUTANEOUS at 21:46

## 2021-09-13 RX ADMIN — Medication 4 UNIT(S): at 11:57

## 2021-09-13 RX ADMIN — Medication 25 MILLIGRAM(S): at 05:57

## 2021-09-13 RX ADMIN — HEPARIN SODIUM 5000 UNIT(S): 5000 INJECTION INTRAVENOUS; SUBCUTANEOUS at 05:57

## 2021-09-13 RX ADMIN — Medication 5 MILLIGRAM(S): at 21:45

## 2021-09-13 RX ADMIN — TAMSULOSIN HYDROCHLORIDE 0.4 MILLIGRAM(S): 0.4 CAPSULE ORAL at 21:44

## 2021-09-13 RX ADMIN — Medication 100 MILLIGRAM(S): at 21:46

## 2021-09-13 RX ADMIN — Medication 250 MILLIGRAM(S): at 13:26

## 2021-09-13 RX ADMIN — Medication 600 MILLIGRAM(S): at 05:57

## 2021-09-13 RX ADMIN — Medication 100 MILLIGRAM(S): at 05:57

## 2021-09-13 RX ADMIN — SODIUM CHLORIDE 3 MILLILITER(S): 9 INJECTION INTRAMUSCULAR; INTRAVENOUS; SUBCUTANEOUS at 06:28

## 2021-09-13 RX ADMIN — Medication 650 MILLIGRAM(S): at 22:15

## 2021-09-13 RX ADMIN — Medication 600 MILLIGRAM(S): at 17:07

## 2021-09-13 RX ADMIN — SPIRONOLACTONE 25 MILLIGRAM(S): 25 TABLET, FILM COATED ORAL at 05:59

## 2021-09-13 RX ADMIN — HEPARIN SODIUM 5000 UNIT(S): 5000 INJECTION INTRAVENOUS; SUBCUTANEOUS at 13:24

## 2021-09-13 RX ADMIN — HEPARIN SODIUM 5000 UNIT(S): 5000 INJECTION INTRAVENOUS; SUBCUTANEOUS at 21:45

## 2021-09-13 RX ADMIN — GABAPENTIN 300 MILLIGRAM(S): 400 CAPSULE ORAL at 13:24

## 2021-09-13 RX ADMIN — Medication 25 MILLIGRAM(S): at 17:07

## 2021-09-13 RX ADMIN — ATORVASTATIN CALCIUM 80 MILLIGRAM(S): 80 TABLET, FILM COATED ORAL at 21:44

## 2021-09-13 RX ADMIN — FINASTERIDE 5 MILLIGRAM(S): 5 TABLET, FILM COATED ORAL at 13:24

## 2021-09-13 RX ADMIN — Medication 6: at 17:03

## 2021-09-13 RX ADMIN — PANTOPRAZOLE SODIUM 40 MILLIGRAM(S): 20 TABLET, DELAYED RELEASE ORAL at 05:58

## 2021-09-13 RX ADMIN — SENNA PLUS 2 TABLET(S): 8.6 TABLET ORAL at 21:45

## 2021-09-13 RX ADMIN — Medication 650 MILLIGRAM(S): at 13:23

## 2021-09-13 RX ADMIN — Medication 2: at 07:37

## 2021-09-13 RX ADMIN — Medication 650 MILLIGRAM(S): at 13:53

## 2021-09-13 RX ADMIN — AMIODARONE HYDROCHLORIDE 200 MILLIGRAM(S): 400 TABLET ORAL at 05:59

## 2021-09-13 RX ADMIN — Medication 40 MILLIGRAM(S): at 05:59

## 2021-09-13 RX ADMIN — GABAPENTIN 300 MILLIGRAM(S): 400 CAPSULE ORAL at 21:44

## 2021-09-13 RX ADMIN — GABAPENTIN 300 MILLIGRAM(S): 400 CAPSULE ORAL at 05:57

## 2021-09-13 RX ADMIN — Medication 100 MILLIGRAM(S): at 13:27

## 2021-09-13 RX ADMIN — Medication 325 MILLIGRAM(S): at 13:24

## 2021-09-13 RX ADMIN — SODIUM CHLORIDE 3 MILLILITER(S): 9 INJECTION INTRAMUSCULAR; INTRAVENOUS; SUBCUTANEOUS at 13:50

## 2021-09-13 RX ADMIN — Medication 4: at 11:57

## 2021-09-13 RX ADMIN — Medication 650 MILLIGRAM(S): at 21:45

## 2021-09-13 RX ADMIN — SODIUM CHLORIDE 3 MILLILITER(S): 9 INJECTION INTRAMUSCULAR; INTRAVENOUS; SUBCUTANEOUS at 22:19

## 2021-09-13 RX ADMIN — Medication 4 UNIT(S): at 07:38

## 2021-09-13 RX ADMIN — Medication 4 UNIT(S): at 17:03

## 2021-09-13 NOTE — PROGRESS NOTE ADULT - ASSESSMENT
64 y/o M w/ PMHx of T2DM c/b peripheral neuropathy, HLD, HTN, BPH, and CAD s/p C3L w/ Dr. Valerio 8/27/21 (d/c'd home 9/6) presents to [facility] ED c/o worsening LLE redness, swelling, and pain x 3-4 days. He states that his leg started feeling "hard" and warm today, which prompted him to call the CTS answering service. After d/w Dr. Valerio, decision was made for the pt to come to the ED for further evaluation and likely admission for further workup and management. Additionally, pt reports having an open wound on his L shin that has started to have bloody drainage since he was d/c'd home.     Pt seen and evaluated on arrival to ED - VSS, NAD. Afebrile, no leukocytosis, nontoxic appearing. XR tib/fib showed no cortical erosions or gas tracking. LLE duplex negative for DVT, (+) L greater saphenous vein thrombosis - likely residual as pt had L SVG harvest for CABG, no indication for AC as per Dr. Valerio. BCx x 2 sent in ED - will f/u. D/w Dr. Valerio - will admit pt for further workup and management. Per Dr. Valerio, pt started on Zinacef for ppx - 1st dose given in ED. Will otherwise resume home medication regimen.   9/11 HD stable SR 60's.  Vancomycin added.  Continue zinacef.  Ace wrap LLE.  Neg for DVT  9/12 Premeal/lantus added  Vanco/Zinacef LLE cellulitis>Vanco level after 4th dose  9/13 LLE improved edema, wd with scant serous drainage  Cont abx

## 2021-09-13 NOTE — PROGRESS NOTE ADULT - SUBJECTIVE AND OBJECTIVE BOX
VITAL SIGNS    Telemetry:  nsr    Vital Signs Last 24 Hrs  T(C): 36 (21 @ 13:41), Max: 36.9 (21 @ 19:31)  T(F): 96.8 (21 @ 13:41), Max: 98.4 (21 @ 19:31)  HR: 72 (21 @ 13:41) (69 - 72)  BP: 144/79 (21 @ 17:07) (103/64 - 144/79)  RR: 18 (21 @ 13:41) (18 - 18)  SpO2: 100% (21 @ 13:41) (95% - 100%)                    @ 07:01  -   @ 07:00  --------------------------------------------------------  IN: 800 mL / OUT: 3000 mL / NET: -2200 mL     @ 07:01  -   @ 18:17  --------------------------------------------------------  IN: 900 mL / OUT: 2400 mL / NET: -1500 mL          Daily     Daily Weight in k.8 (13 Sep 2021 09:43)            CAPILLARY BLOOD GLUCOSE      POCT Blood Glucose.: 267 mg/dL (13 Sep 2021 16:38)  POCT Blood Glucose.: 220 mg/dL (13 Sep 2021 11:35)  POCT Blood Glucose.: 176 mg/dL (13 Sep 2021 07:33)  POCT Blood Glucose.: 170 mg/dL (12 Sep 2021 21:29)                Pacing Wires         Coumadin    [ ] YES          [ x ]      NO                                   PHYSICAL EXAM        Neurology: alert and oriented x 3, nonfocal, no gross deficits  CV : s1 s2 RRR  Sternal Wound :  CDI , Stable  Lungs: cta  Abdomen: soft, nontender, nondistended, positive bowel sounds, last bowel movement                      :    voiding    Extremities:    trace   edema   /  -   calve tenderness ,    L leg    incisions scant erythema, serous drainage          acetaminophen   Tablet .. 650 milliGRAM(s) Oral every 6 hours PRN  aMIOdarone    Tablet 200 milliGRAM(s) Oral daily  aspirin enteric coated 325 milliGRAM(s) Oral daily  atorvastatin 80 milliGRAM(s) Oral at bedtime  bisacodyl 5 milliGRAM(s) Oral at bedtime  cefuroxime  IVPB      cefuroxime  IVPB 1500 milliGRAM(s) IV Intermittent every 8 hours  dextrose 40% Gel 15 Gram(s) Oral once  dextrose 5%. 1000 milliLiter(s) IV Continuous <Continuous>  dextrose 5%. 1000 milliLiter(s) IV Continuous <Continuous>  dextrose 50% Injectable 25 Gram(s) IV Push once  dextrose 50% Injectable 12.5 Gram(s) IV Push once  dextrose 50% Injectable 25 Gram(s) IV Push once  finasteride 5 milliGRAM(s) Oral daily  furosemide    Tablet 40 milliGRAM(s) Oral daily  gabapentin 300 milliGRAM(s) Oral three times a day  gemfibrozil 600 milliGRAM(s) Oral two times a day  glucagon  Injectable 1 milliGRAM(s) IntraMuscular once  heparin   Injectable 5000 Unit(s) SubCutaneous every 8 hours  influenza   Vaccine 0.5 milliLiter(s) IntraMuscular once  insulin glargine Injectable (LANTUS) 6 Unit(s) SubCutaneous at bedtime  insulin lispro (ADMELOG) corrective regimen sliding scale   SubCutaneous three times a day before meals  insulin lispro (ADMELOG) corrective regimen sliding scale   SubCutaneous at bedtime  insulin lispro Injectable (ADMELOG) 4 Unit(s) SubCutaneous before breakfast  insulin lispro Injectable (ADMELOG) 4 Unit(s) SubCutaneous before lunch  insulin lispro Injectable (ADMELOG) 4 Unit(s) SubCutaneous before dinner  metoprolol tartrate 25 milliGRAM(s) Oral every 12 hours  pantoprazole    Tablet 40 milliGRAM(s) Oral before breakfast  polyethylene glycol 3350 17 Gram(s) Oral daily  senna 2 Tablet(s) Oral at bedtime  sodium chloride 0.9% lock flush 3 milliLiter(s) IV Push every 8 hours  spironolactone 25 milliGRAM(s) Oral daily  tamsulosin 0.4 milliGRAM(s) Oral at bedtime  vancomycin  IVPB      vancomycin  IVPB 1000 milliGRAM(s) IV Intermittent daily                    Physical Therapy Rec:   Home  [  ]   Home w/ PT  [  ]  Rehab  [  ]  Discussed with Cardiothoracic Team at AM rounds.

## 2021-09-14 DIAGNOSIS — I10 ESSENTIAL (PRIMARY) HYPERTENSION: ICD-10-CM

## 2021-09-14 LAB
ANION GAP SERPL CALC-SCNC: 13 MMOL/L — SIGNIFICANT CHANGE UP (ref 5–17)
BUN SERPL-MCNC: 23 MG/DL — SIGNIFICANT CHANGE UP (ref 7–23)
CALCIUM SERPL-MCNC: 8.9 MG/DL — SIGNIFICANT CHANGE UP (ref 8.4–10.5)
CHLORIDE SERPL-SCNC: 99 MMOL/L — SIGNIFICANT CHANGE UP (ref 96–108)
CO2 SERPL-SCNC: 22 MMOL/L — SIGNIFICANT CHANGE UP (ref 22–31)
CREAT SERPL-MCNC: 1.02 MG/DL — SIGNIFICANT CHANGE UP (ref 0.5–1.3)
GLUCOSE SERPL-MCNC: 257 MG/DL — HIGH (ref 70–99)
HCT VFR BLD CALC: 29 % — LOW (ref 39–50)
HGB BLD-MCNC: 9.1 G/DL — LOW (ref 13–17)
MCHC RBC-ENTMCNC: 28.8 PG — SIGNIFICANT CHANGE UP (ref 27–34)
MCHC RBC-ENTMCNC: 31.4 GM/DL — LOW (ref 32–36)
MCV RBC AUTO: 91.8 FL — SIGNIFICANT CHANGE UP (ref 80–100)
NRBC # BLD: 0 /100 WBCS — SIGNIFICANT CHANGE UP (ref 0–0)
PLATELET # BLD AUTO: 535 K/UL — HIGH (ref 150–400)
POTASSIUM SERPL-MCNC: 4.1 MMOL/L — SIGNIFICANT CHANGE UP (ref 3.5–5.3)
POTASSIUM SERPL-SCNC: 4.1 MMOL/L — SIGNIFICANT CHANGE UP (ref 3.5–5.3)
RBC # BLD: 3.16 M/UL — LOW (ref 4.2–5.8)
RBC # FLD: 14.7 % — HIGH (ref 10.3–14.5)
SODIUM SERPL-SCNC: 134 MMOL/L — LOW (ref 135–145)
WBC # BLD: 5.71 K/UL — SIGNIFICANT CHANGE UP (ref 3.8–10.5)
WBC # FLD AUTO: 5.71 K/UL — SIGNIFICANT CHANGE UP (ref 3.8–10.5)

## 2021-09-14 PROCEDURE — 99232 SBSQ HOSP IP/OBS MODERATE 35: CPT | Mod: 24

## 2021-09-14 RX ORDER — INSULIN LISPRO 100/ML
VIAL (ML) SUBCUTANEOUS
Refills: 0 | Status: DISCONTINUED | OUTPATIENT
Start: 2021-09-14 | End: 2021-09-20

## 2021-09-14 RX ORDER — INSULIN LISPRO 100/ML
6 VIAL (ML) SUBCUTANEOUS
Refills: 0 | Status: DISCONTINUED | OUTPATIENT
Start: 2021-09-14 | End: 2021-09-14

## 2021-09-14 RX ORDER — INSULIN GLARGINE 100 [IU]/ML
10 INJECTION, SOLUTION SUBCUTANEOUS AT BEDTIME
Refills: 0 | Status: DISCONTINUED | OUTPATIENT
Start: 2021-09-14 | End: 2021-09-14

## 2021-09-14 RX ORDER — INSULIN GLARGINE 100 [IU]/ML
14 INJECTION, SOLUTION SUBCUTANEOUS AT BEDTIME
Refills: 0 | Status: DISCONTINUED | OUTPATIENT
Start: 2021-09-14 | End: 2021-09-15

## 2021-09-14 RX ORDER — INSULIN LISPRO 100/ML
VIAL (ML) SUBCUTANEOUS AT BEDTIME
Refills: 0 | Status: DISCONTINUED | OUTPATIENT
Start: 2021-09-14 | End: 2021-09-20

## 2021-09-14 RX ORDER — INSULIN LISPRO 100/ML
8 VIAL (ML) SUBCUTANEOUS
Refills: 0 | Status: DISCONTINUED | OUTPATIENT
Start: 2021-09-14 | End: 2021-09-15

## 2021-09-14 RX ADMIN — FINASTERIDE 5 MILLIGRAM(S): 5 TABLET, FILM COATED ORAL at 12:20

## 2021-09-14 RX ADMIN — SENNA PLUS 2 TABLET(S): 8.6 TABLET ORAL at 21:59

## 2021-09-14 RX ADMIN — Medication 600 MILLIGRAM(S): at 05:15

## 2021-09-14 RX ADMIN — ATORVASTATIN CALCIUM 80 MILLIGRAM(S): 80 TABLET, FILM COATED ORAL at 21:58

## 2021-09-14 RX ADMIN — Medication 6: at 12:33

## 2021-09-14 RX ADMIN — TAMSULOSIN HYDROCHLORIDE 0.4 MILLIGRAM(S): 0.4 CAPSULE ORAL at 21:58

## 2021-09-14 RX ADMIN — SPIRONOLACTONE 25 MILLIGRAM(S): 25 TABLET, FILM COATED ORAL at 05:16

## 2021-09-14 RX ADMIN — Medication 600 MILLIGRAM(S): at 17:02

## 2021-09-14 RX ADMIN — SODIUM CHLORIDE 3 MILLILITER(S): 9 INJECTION INTRAMUSCULAR; INTRAVENOUS; SUBCUTANEOUS at 21:43

## 2021-09-14 RX ADMIN — Medication 6 UNIT(S): at 08:24

## 2021-09-14 RX ADMIN — Medication 325 MILLIGRAM(S): at 12:20

## 2021-09-14 RX ADMIN — Medication 100 MILLIGRAM(S): at 14:27

## 2021-09-14 RX ADMIN — Medication 8 UNIT(S): at 17:01

## 2021-09-14 RX ADMIN — GABAPENTIN 300 MILLIGRAM(S): 400 CAPSULE ORAL at 21:59

## 2021-09-14 RX ADMIN — Medication 250 MILLIGRAM(S): at 12:49

## 2021-09-14 RX ADMIN — Medication 25 MILLIGRAM(S): at 17:08

## 2021-09-14 RX ADMIN — SODIUM CHLORIDE 3 MILLILITER(S): 9 INJECTION INTRAMUSCULAR; INTRAVENOUS; SUBCUTANEOUS at 06:17

## 2021-09-14 RX ADMIN — AMIODARONE HYDROCHLORIDE 200 MILLIGRAM(S): 400 TABLET ORAL at 05:16

## 2021-09-14 RX ADMIN — Medication 6 UNIT(S): at 12:34

## 2021-09-14 RX ADMIN — PANTOPRAZOLE SODIUM 40 MILLIGRAM(S): 20 TABLET, DELAYED RELEASE ORAL at 05:17

## 2021-09-14 RX ADMIN — Medication 3: at 17:01

## 2021-09-14 RX ADMIN — HEPARIN SODIUM 5000 UNIT(S): 5000 INJECTION INTRAVENOUS; SUBCUTANEOUS at 05:15

## 2021-09-14 RX ADMIN — POLYETHYLENE GLYCOL 3350 17 GRAM(S): 17 POWDER, FOR SOLUTION ORAL at 12:20

## 2021-09-14 RX ADMIN — GABAPENTIN 300 MILLIGRAM(S): 400 CAPSULE ORAL at 05:16

## 2021-09-14 RX ADMIN — GABAPENTIN 300 MILLIGRAM(S): 400 CAPSULE ORAL at 14:21

## 2021-09-14 RX ADMIN — Medication 650 MILLIGRAM(S): at 17:02

## 2021-09-14 RX ADMIN — Medication 100 MILLIGRAM(S): at 05:14

## 2021-09-14 RX ADMIN — HEPARIN SODIUM 5000 UNIT(S): 5000 INJECTION INTRAVENOUS; SUBCUTANEOUS at 14:21

## 2021-09-14 RX ADMIN — Medication 25 MILLIGRAM(S): at 05:15

## 2021-09-14 RX ADMIN — HEPARIN SODIUM 5000 UNIT(S): 5000 INJECTION INTRAVENOUS; SUBCUTANEOUS at 21:59

## 2021-09-14 RX ADMIN — Medication 40 MILLIGRAM(S): at 05:15

## 2021-09-14 RX ADMIN — Medication 650 MILLIGRAM(S): at 17:32

## 2021-09-14 RX ADMIN — SODIUM CHLORIDE 3 MILLILITER(S): 9 INJECTION INTRAMUSCULAR; INTRAVENOUS; SUBCUTANEOUS at 13:44

## 2021-09-14 RX ADMIN — Medication 100 MILLIGRAM(S): at 21:59

## 2021-09-14 RX ADMIN — Medication 4: at 08:24

## 2021-09-14 RX ADMIN — INSULIN GLARGINE 14 UNIT(S): 100 INJECTION, SOLUTION SUBCUTANEOUS at 21:59

## 2021-09-14 NOTE — CONSULT NOTE ADULT - ASSESSMENT
Assessment  DMT2: 66y Male with DM T2 with hyperglycemia, was on oral meds at home, now on basal bolus insulin, dose increased today, blood sugars are running high and not at target, no hypoglycemic episodes, eating meals with good appetite, appears comfortable.  CAD: s/p CABG, on medications, stable, monitored.  Wound Infection: On meds, monitored.  HTN: Controlled,  on antihypertensive medications.        Cb Hill MD  Cell: [phone]  Office: [phone]     Assessment  DMT2: 66y Male with DM T2 with hyperglycemia, was on oral meds at home, now on basal bolus insulin, dose increased today, blood sugars are running high and not at target, no hypoglycemic episodes, eating meals with  good appetite, appears comfortable.  CAD: s/p CABG, on medications, stable, monitored.  Wound Infection: On meds, monitored.  HTN: Controlled,  on antihypertensive medications.        Cb Hill MD  Cell: [phone]  Office: [phone]

## 2021-09-14 NOTE — CONSULT NOTE ADULT - PROBLEM SELECTOR RECOMMENDATION 9
Will increase Lantus to 14u at bedtime.  Will increase Admelog to 8u before each meal and adjust Admelog correction scale coverage to be low-scale ac/hs. Will continue monitoring FS and FU.  Patient counseled for compliance with consistent low carb diet and exercise as tolerated outpatient.

## 2021-09-14 NOTE — CONSULT NOTE ADULT - SUBJECTIVE AND OBJECTIVE BOX
HPI:  64 y/o M w/ PMHx of T2DM c/b peripheral neuropathy, HLD, HTN, BPH, and CAD s/p C3L w/ Dr. Valerio 8/27/21 presents to [facility] ED c/o LLE redness, swelling, and pain x 3-4 days. Pt was d/c'd home on 9/6 after undergoing a CABG w/ L SVG harvest. He states he had mild edema in his b/l LE when he went home, but the following day the LLE started to become more swollen, painful, and erythematous. Pt reports that he tried elevating the leg without relief. He states that his leg started feeling "hard" and warm today, which prompted him to call the CTS answering service. After d/w Dr. Valerio, decision was made for the pt to come to the ED for further evaluation and likely admission for further workup and management. Additionally, pt reports having an open wound on his L shin that has started to have bloody drainage since he was d/c'd home. Denies fever, chills, chest pain, SOB, numbness, tingling, weakness.  (11 Sep 2021 05:45)    Patient known to Service from recent hospital admission, has history of diabetes, on oral meds at home (Glimepiride 2mg AM, Janumet 50/1000 BID), reports well-controlled FS, no recent hypoglycemic episodes, no polyuria polydipsia. Patient follows up with PCP for diabetes management.  Endo was consulted for glycemic control.    PAST MEDICAL & SURGICAL HISTORY:  T2DM (type 2 diabetes mellitus)  c/b peripheral neuropathy    HTN (hypertension)    HLD (hyperlipidemia)    BPH (benign prostatic hyperplasia)    H/O shoulder surgery  right    H/O arthroscopy of knee    S/P arthroscopy of right knee        FAMILY HISTORY:  MI (myocardial infarction) (Father)        Social History:    Outpatient Medications:    MEDICATIONS  (STANDING):  aMIOdarone    Tablet 200 milliGRAM(s) Oral daily  aspirin enteric coated 325 milliGRAM(s) Oral daily  atorvastatin 80 milliGRAM(s) Oral at bedtime  bisacodyl 5 milliGRAM(s) Oral at bedtime  cefuroxime  IVPB      cefuroxime  IVPB 1500 milliGRAM(s) IV Intermittent every 8 hours  dextrose 40% Gel 15 Gram(s) Oral once  dextrose 5%. 1000 milliLiter(s) (50 mL/Hr) IV Continuous <Continuous>  dextrose 5%. 1000 milliLiter(s) (100 mL/Hr) IV Continuous <Continuous>  dextrose 50% Injectable 25 Gram(s) IV Push once  dextrose 50% Injectable 12.5 Gram(s) IV Push once  dextrose 50% Injectable 25 Gram(s) IV Push once  finasteride 5 milliGRAM(s) Oral daily  furosemide    Tablet 40 milliGRAM(s) Oral daily  gabapentin 300 milliGRAM(s) Oral three times a day  gemfibrozil 600 milliGRAM(s) Oral two times a day  glucagon  Injectable 1 milliGRAM(s) IntraMuscular once  heparin   Injectable 5000 Unit(s) SubCutaneous every 8 hours  influenza   Vaccine 0.5 milliLiter(s) IntraMuscular once  insulin glargine Injectable (LANTUS) 14 Unit(s) SubCutaneous at bedtime  insulin lispro (ADMELOG) corrective regimen sliding scale   SubCutaneous at bedtime  insulin lispro (ADMELOG) corrective regimen sliding scale   SubCutaneous three times a day before meals  insulin lispro Injectable (ADMELOG) 8 Unit(s) SubCutaneous three times a day before meals  metoprolol tartrate 25 milliGRAM(s) Oral every 12 hours  pantoprazole    Tablet 40 milliGRAM(s) Oral before breakfast  polyethylene glycol 3350 17 Gram(s) Oral daily  senna 2 Tablet(s) Oral at bedtime  sodium chloride 0.9% lock flush 3 milliLiter(s) IV Push every 8 hours  spironolactone 25 milliGRAM(s) Oral daily  tamsulosin 0.4 milliGRAM(s) Oral at bedtime  vancomycin  IVPB      vancomycin  IVPB 1000 milliGRAM(s) IV Intermittent daily    MEDICATIONS  (PRN):  acetaminophen   Tablet .. 650 milliGRAM(s) Oral every 6 hours PRN Mild Pain (1 - 3)      Allergies    sulfa drugs (Hives)    Intolerances      Review of Systems:  Constitutional: No fever, no chills  Eyes: No blurry vision  Neuro: No tremors  HEENT: No pain, no neck swelling  Cardiovascular: No chest pain, no palpitations  Respiratory: Has SOB, no cough  GI: No nausea, vomiting, abdominal pain  : No dysuria  Skin: no rash  MSK: Has leg swelling.  Psych: no depression  Endocrine: no polyuria, polydipsia    ALL OTHER SYSTEMS REVIEWED AND NEGATIVE    UNABLE TO OBTAIN    PHYSICAL EXAM:  VITALS: T(C): 36.7 (09-14-21 @ 04:48)  T(F): 98.1 (09-14-21 @ 04:48), Max: 98.6 (09-13-21 @ 20:08)  HR: 64 (09-14-21 @ 04:48) (62 - 64)  BP: 113/69 (09-14-21 @ 04:48) (112/53 - 144/79)  RR:  (18 - 18)  SpO2:  (97% - 100%)  Wt(kg): --  GENERAL: NAD, well-groomed, well-developed  EYES: No proptosis, no lid lag  HEENT:  Atraumatic, Normocephalic  THYROID: Normal size, no palpable nodules  RESPIRATORY: Clear to auscultation bilaterally; No rales, rhonchi, wheezing  CARDIOVASCULAR: Si S2, No murmurs;  GI: Soft, non distended, normal bowel sounds  SKIN: Dry, intact, No rashes or lesions  MUSCULOSKELETAL: Has BL lower extremity edema.  NEURO:  no tremor, sensation decreased in feet BL,    POCT Blood Glucose.: 206 mg/dL (09-14-21 @ 07:44)  POCT Blood Glucose.: 218 mg/dL (09-13-21 @ 21:42)  POCT Blood Glucose.: 267 mg/dL (09-13-21 @ 16:38)  POCT Blood Glucose.: 220 mg/dL (09-13-21 @ 11:35)  POCT Blood Glucose.: 176 mg/dL (09-13-21 @ 07:33)  POCT Blood Glucose.: 170 mg/dL (09-12-21 @ 21:29)  POCT Blood Glucose.: 171 mg/dL (09-12-21 @ 16:45)  POCT Blood Glucose.: 241 mg/dL (09-12-21 @ 12:20)  POCT Blood Glucose.: 196 mg/dL (09-12-21 @ 08:07)  POCT Blood Glucose.: 137 mg/dL (09-11-21 @ 21:23)  POCT Blood Glucose.: 195 mg/dL (09-11-21 @ 16:21)                            9.1    5.71  )-----------( 535      ( 14 Sep 2021 05:40 )             29.0       09-14    134<L>  |  99  |  23  ----------------------------<  257<H>  4.1   |  22  |  1.02    EGFR if : 89  EGFR if non : 77    Ca    8.9      09-14        Thyroid Function Tests:  08-21 @ 09:33 TSH -- FreeT4 1.3 T3 -- Anti TPO -- Anti Thyroglobulin Ab -- TSI --  08-21 @ 02:11 TSH 0.79 FreeT4 -- T3 75 Anti TPO -- Anti Thyroglobulin Ab -- TSI --          08-20 Chol 136 Direct LDL -- LDL calculated 91 HDL 35<L> Trig 50    Radiology:                HPI:  64 y/o M w/ PMHx of T2DM c/b peripheral neuropathy, HLD, HTN, BPH, and CAD s/p C3L w/ Dr. Valerio 8/27/21 presents to [facility] ED c/o LLE redness, swelling, and pain x 3-4 days. Pt was d/c'd home on 9/6 after undergoing a CABG w/ L SVG harvest. He states he had mild edema in his b/l LE when he went home, but the following day the LLE started to become more swollen, painful, and erythematous. Pt reports that he tried elevating the leg without relief. He states that his leg started feeling "hard" and warm today, which prompted him to call the CTS answering service. After d/w Dr. Valerio, decision was made for the pt to come to the ED for further evaluation and likely admission for further workup and management. Additionally, pt reports having an open wound on his L shin that has started to have bloody drainage since he was d/c'd home. Denies fever, chills, chest pain, SOB, numbness, tingling, weakness.  (11 Sep 2021 05:45)    Patient known to Service from recent hospital admission, has history of diabetes, on oral meds at home (Glimepiride 2mg AM, Janumet 50/1000 BID), reports well-controlled FS, no recent hypoglycemic episodes, no polyuria polydipsia. Patient follows up with PCP for diabetes management.  Endo was consulted for glycemic control.    PAST MEDICAL & SURGICAL HISTORY:  T2DM (type 2 diabetes mellitus)  c/b peripheral neuropathy    HTN (hypertension)    HLD (hyperlipidemia)    BPH (benign prostatic hyperplasia)    H/O shoulder surgery  right    H/O arthroscopy of knee    S/P arthroscopy of right knee        FAMILY HISTORY:  MI (myocardial infarction) (Father)        Social History:    Outpatient Medications:    MEDICATIONS  (STANDING):  aMIOdarone    Tablet 200 milliGRAM(s) Oral daily  aspirin enteric coated 325 milliGRAM(s) Oral daily  atorvastatin 80 milliGRAM(s) Oral at bedtime  bisacodyl 5 milliGRAM(s) Oral at bedtime  cefuroxime  IVPB      cefuroxime  IVPB 1500 milliGRAM(s) IV Intermittent every 8 hours  dextrose 40% Gel 15 Gram(s) Oral once  dextrose 5%. 1000 milliLiter(s) (50 mL/Hr) IV Continuous <Continuous>  dextrose 5%. 1000 milliLiter(s) (100 mL/Hr) IV Continuous <Continuous>  dextrose 50% Injectable 25 Gram(s) IV Push once  dextrose 50% Injectable 12.5 Gram(s) IV Push once  dextrose 50% Injectable 25 Gram(s) IV Push once  finasteride 5 milliGRAM(s) Oral daily  furosemide    Tablet 40 milliGRAM(s) Oral daily  gabapentin 300 milliGRAM(s) Oral three times a day  gemfibrozil 600 milliGRAM(s) Oral two times a day  glucagon  Injectable 1 milliGRAM(s) IntraMuscular once  heparin   Injectable 5000 Unit(s) SubCutaneous every 8 hours  influenza   Vaccine 0.5 milliLiter(s) IntraMuscular once  insulin glargine Injectable (LANTUS) 14 Unit(s) SubCutaneous at bedtime  insulin lispro (ADMELOG) corrective regimen sliding scale   SubCutaneous at bedtime  insulin lispro (ADMELOG) corrective regimen sliding scale   SubCutaneous three times a day before meals  insulin lispro Injectable (ADMELOG) 8 Unit(s) SubCutaneous three times a day before meals  metoprolol tartrate 25 milliGRAM(s) Oral every 12 hours  pantoprazole    Tablet 40 milliGRAM(s) Oral before breakfast  polyethylene glycol 3350 17 Gram(s) Oral daily  senna 2 Tablet(s) Oral at bedtime  sodium chloride 0.9% lock flush 3 milliLiter(s) IV Push every 8 hours  spironolactone 25 milliGRAM(s) Oral daily  tamsulosin 0.4 milliGRAM(s) Oral at bedtime  vancomycin  IVPB      vancomycin  IVPB 1000 milliGRAM(s) IV Intermittent daily    MEDICATIONS  (PRN):  acetaminophen   Tablet .. 650 milliGRAM(s) Oral every 6 hours PRN Mild Pain (1 - 3)      Allergies    sulfa drugs (Hives)    Intolerances      Review of Systems:  Constitutional: No fever, no chills  Eyes: No blurry vision  Neuro: No tremors  HEENT: No pain, no neck swelling  Cardiovascular: No chest pain, no palpitations  Respiratory: Has SOB, no cough  GI: No nausea, vomiting, abdominal pain  : No dysuria  Skin: no rash  MSK: Has leg swelling.  Psych: no depression  Endocrine: no polyuria, polydipsia    ALL OTHER SYSTEMS REVIEWED AND NEGATIVE    UNABLE TO OBTAIN    PHYSICAL EXAM:  VITALS: T(C): 36.7 (09-14-21 @ 04:48)  T(F): 98.1 (09-14-21 @ 04:48), Max: 98.6 (09-13-21 @ 20:08)  HR: 64 (09-14-21 @ 04:48) (62 - 64)  BP: 113/69 (09-14-21 @ 04:48) (112/53 - 144/79)  RR:  (18 - 18)  SpO2:  (97% - 100%)  Wt(kg): --  GENERAL: NAD, well-groomed, well-developed  EYES: No proptosis, no lid lag  HEENT:  Atraumatic, Normocephalic  THYROID: Normal size, no palpable nodules  RESPIRATORY: Clear to auscultation bilaterally; No rales, rhonchi, wheezing  CARDIOVASCULAR: Si S2, No murmurs;  GI: Soft, non distended, normal bowel sounds  SKIN: Dry, intact, No rashes or lesions  MUSCULOSKELETAL: Has BL lower extremity edema.  NEURO:  no tremor, sensation decreased in feet BL,    POCT Blood Glucose.: 206 mg/dL (09-14-21 @ 07:44)  POCT Blood Glucose.: 218 mg/dL (09-13-21 @ 21:42)  POCT Blood Glucose.: 267 mg/dL (09-13-21 @ 16:38)  POCT Blood Glucose.: 220 mg/dL (09-13-21 @ 11:35)  POCT Blood Glucose.: 176 mg/dL (09-13-21 @ 07:33)  POCT Blood Glucose.: 170 mg/dL (09-12-21 @ 21:29)  POCT Blood Glucose.: 171 mg/dL (09-12-21 @ 16:45)  POCT Blood Glucose.: 241 mg/dL (09-12-21 @ 12:20)  POCT Blood Glucose.: 196 mg/dL (09-12-21 @ 08:07)  POCT Blood Glucose.: 137 mg/dL (09-11-21 @ 21:23)  POCT Blood Glucose.: 195 mg/dL (09-11-21 @ 16:21)                            9.1    5.71  )-----------( 535      ( 14 Sep 2021 05:40 )             29.0       09-14    134<L>  |  99  |  23  ----------------------------<  257<H>  4.1   |  22  |  1.02    EGFR if : 89  EGFR if non : 77    Ca    8.9      09-14        Thyroid Function Tests:  08-21 @ 09:33 TSH -- FreeT4 1.3 T3 -- Anti TPO -- Anti Thyroglobulin Ab -- TSI --  08-21 @ 02:11 TSH 0.79 FreeT4 -- T3 75 Anti TPO -- Anti Thyroglobulin Ab -- TSI --          08-20 Chol 136 Direct LDL -- LDL calculated 91 HDL 35<L> Trig 50    Radiology:

## 2021-09-14 NOTE — PROGRESS NOTE ADULT - ASSESSMENT
64 y/o M w/ PMHx of T2DM c/b peripheral neuropathy, HLD, HTN, BPH, and CAD s/p C3L w/ Dr. Valerio 8/27/21 (d/c'd home 9/6) presents to [facility] ED c/o worsening LLE redness, swelling, and pain x 3-4 days. He states that his leg started feeling "hard" and warm today, which prompted him to call the CTS answering service. After d/w Dr. Valerio, decision was made for the pt to come to the ED for further evaluation and likely admission for further workup and management. Additionally, pt reports having an open wound on his L shin that has started to have bloody drainage since he was d/c'd home.     Pt seen and evaluated on arrival to ED - VSS, NAD. Afebrile, no leukocytosis, nontoxic appearing. XR tib/fib showed no cortical erosions or gas tracking. LLE duplex negative for DVT, (+) L greater saphenous vein thrombosis - likely residual as pt had L SVG harvest for CABG, no indication for AC as per Dr. Valerio. BCx x 2 sent in ED - will f/u. D/w Dr. Valerio - will admit pt for further workup and management. Per Dr. Valerio, pt started on Zinacef for ppx - 1st dose given in ED. Will otherwise resume home medication regimen.   9/11 HD stable SR 60's.  Vancomycin added.  Continue zinacef.  Ace wrap LLE.  Neg for DVT  9/12 Premeal/lantus added  Vanco/Zinacef LLE cellulitis>Vanco level after 4th dose  9/13 LLE improved edema, wd with scant serous drainage  Cont abx  9/14Hyperglycemia, endo consult, increase coverage  Cont iv abx for now. Ace wrap

## 2021-09-14 NOTE — PROGRESS NOTE ADULT - SUBJECTIVE AND OBJECTIVE BOX
VITAL SIGNS    Telemetry:      Vital Signs Last 24 Hrs  T(C): 36.7 (21 @ 04:48), Max: 37 (21 @ 20:08)  T(F): 98.1 (21 @ 04:48), Max: 98.6 (21 @ 20:08)  HR: 64 (21 @ 04:48) (62 - 72)  BP: 113/69 (21 @ 04:48) (112/53 - 144/79)  RR: 18 (21 @ 04:48) (18 - 18)  SpO2: 97% (21 @ 04:48) (97% - 100%)                    @ 07:01  -   @ 07:00  --------------------------------------------------------  IN: 900 mL / OUT: 2800 mL / NET: -1900 mL          Daily     Daily Weight in k.8 (13 Sep 2021 09:43)            CAPILLARY BLOOD GLUCOSE      POCT Blood Glucose.: 218 mg/dL (13 Sep 2021 21:42)  POCT Blood Glucose.: 267 mg/dL (13 Sep 2021 16:38)  POCT Blood Glucose.: 220 mg/dL (13 Sep 2021 11:35)            Drains:     MS         [  ] Drainage:                 L Pleural  [  ]  Drainage:                R Pleural  [  ]  Drainage:    Pacing Wires        [  ]   Settings:                                  Isolated  [  ]    Coumadin    [ ] YES          [  ]      NO                                   PHYSICAL EXAM        Neurology: alert and oriented x 3, nonfocal, no gross deficits  CV : s1 s2 RRR  Sternal Wound :  CDI , Stable  Lungs: cta  Abdomen: soft, nontender, nondistended, positive bowel sounds, last bowel movement                       chest tubes  :    voiding /  - sbd         Extremities:      edema   /  -   calve tenderness ,    L leg  /  R leg  incisions cdi          acetaminophen   Tablet .. 650 milliGRAM(s) Oral every 6 hours PRN  aMIOdarone    Tablet 200 milliGRAM(s) Oral daily  aspirin enteric coated 325 milliGRAM(s) Oral daily  atorvastatin 80 milliGRAM(s) Oral at bedtime  bisacodyl 5 milliGRAM(s) Oral at bedtime  cefuroxime  IVPB      cefuroxime  IVPB 1500 milliGRAM(s) IV Intermittent every 8 hours  dextrose 40% Gel 15 Gram(s) Oral once  dextrose 5%. 1000 milliLiter(s) IV Continuous <Continuous>  dextrose 5%. 1000 milliLiter(s) IV Continuous <Continuous>  dextrose 50% Injectable 25 Gram(s) IV Push once  dextrose 50% Injectable 12.5 Gram(s) IV Push once  dextrose 50% Injectable 25 Gram(s) IV Push once  finasteride 5 milliGRAM(s) Oral daily  furosemide    Tablet 40 milliGRAM(s) Oral daily  gabapentin 300 milliGRAM(s) Oral three times a day  gemfibrozil 600 milliGRAM(s) Oral two times a day  glucagon  Injectable 1 milliGRAM(s) IntraMuscular once  heparin   Injectable 5000 Unit(s) SubCutaneous every 8 hours  influenza   Vaccine 0.5 milliLiter(s) IntraMuscular once  insulin glargine Injectable (LANTUS) 10 Unit(s) SubCutaneous at bedtime  insulin lispro (ADMELOG) corrective regimen sliding scale   SubCutaneous three times a day before meals  insulin lispro (ADMELOG) corrective regimen sliding scale   SubCutaneous at bedtime  insulin lispro Injectable (ADMELOG) 6 Unit(s) SubCutaneous before breakfast  insulin lispro Injectable (ADMELOG) 6 Unit(s) SubCutaneous before lunch  insulin lispro Injectable (ADMELOG) 6 Unit(s) SubCutaneous before dinner  metoprolol tartrate 25 milliGRAM(s) Oral every 12 hours  pantoprazole    Tablet 40 milliGRAM(s) Oral before breakfast  polyethylene glycol 3350 17 Gram(s) Oral daily  senna 2 Tablet(s) Oral at bedtime  sodium chloride 0.9% lock flush 3 milliLiter(s) IV Push every 8 hours  spironolactone 25 milliGRAM(s) Oral daily  tamsulosin 0.4 milliGRAM(s) Oral at bedtime  vancomycin  IVPB      vancomycin  IVPB 1000 milliGRAM(s) IV Intermittent daily                    Physical Therapy Rec:   Home  [  ]   Home w/ PT  [  ]  Rehab  [  ]  Discussed with Cardiothoracic Team at AM rounds.     VITAL SIGNS    Telemetry:  nsr 60    Vital Signs Last 24 Hrs  T(C): 36.7 (21 @ 04:48), Max: 37 (21 @ 20:08)  T(F): 98.1 (21 @ 04:48), Max: 98.6 (21 @ 20:08)  HR: 64 (21 @ 04:48) (62 - 72)  BP: 113/69 (21 @ 04:48) (112/53 - 144/79)  RR: 18 (21 @ 04:48) (18 - 18)  SpO2: 97% (21 @ 04:48) (97% - 100%)                    @ 07:01  -   @ 07:00  --------------------------------------------------------  IN: 900 mL / OUT: 2800 mL / NET: -1900 mL          Daily     Daily Weight in k.8 (13 Sep 2021 09:43)            CAPILLARY BLOOD GLUCOSE      POCT Blood Glucose.: 218 mg/dL (13 Sep 2021 21:42)  POCT Blood Glucose.: 267 mg/dL (13 Sep 2021 16:38)  POCT Blood Glucose.: 220 mg/dL (13 Sep 2021 11:35)            Drains:         Pacing Wires        [    Coumadin    [ ] YES          [ x ]      NO                                   PHYSICAL EXAM        Neurology: alert and oriented x 3, nonfocal, no gross deficits  CV : s1 s2 RRR  Sternal Wound :  CDI , Stable  Lungs: cta  Abdomen: soft, nontender, nondistended, positive bowel sounds, last bowel movement                        :    voiding         Extremities:    -  edema   /  -   calve tenderness ,    L leg    incisions dressing cdi          acetaminophen   Tablet .. 650 milliGRAM(s) Oral every 6 hours PRN  aMIOdarone    Tablet 200 milliGRAM(s) Oral daily  aspirin enteric coated 325 milliGRAM(s) Oral daily  atorvastatin 80 milliGRAM(s) Oral at bedtime  bisacodyl 5 milliGRAM(s) Oral at bedtime  cefuroxime  IVPB      cefuroxime  IVPB 1500 milliGRAM(s) IV Intermittent every 8 hours  dextrose 40% Gel 15 Gram(s) Oral once  dextrose 5%. 1000 milliLiter(s) IV Continuous <Continuous>  dextrose 5%. 1000 milliLiter(s) IV Continuous <Continuous>  dextrose 50% Injectable 25 Gram(s) IV Push once  dextrose 50% Injectable 12.5 Gram(s) IV Push once  dextrose 50% Injectable 25 Gram(s) IV Push once  finasteride 5 milliGRAM(s) Oral daily  furosemide    Tablet 40 milliGRAM(s) Oral daily  gabapentin 300 milliGRAM(s) Oral three times a day  gemfibrozil 600 milliGRAM(s) Oral two times a day  glucagon  Injectable 1 milliGRAM(s) IntraMuscular once  heparin   Injectable 5000 Unit(s) SubCutaneous every 8 hours  influenza   Vaccine 0.5 milliLiter(s) IntraMuscular once  insulin glargine Injectable (LANTUS) 10 Unit(s) SubCutaneous at bedtime  insulin lispro (ADMELOG) corrective regimen sliding scale   SubCutaneous three times a day before meals  insulin lispro (ADMELOG) corrective regimen sliding scale   SubCutaneous at bedtime  insulin lispro Injectable (ADMELOG) 6 Unit(s) SubCutaneous before breakfast  insulin lispro Injectable (ADMELOG) 6 Unit(s) SubCutaneous before lunch  insulin lispro Injectable (ADMELOG) 6 Unit(s) SubCutaneous before dinner  metoprolol tartrate 25 milliGRAM(s) Oral every 12 hours  pantoprazole    Tablet 40 milliGRAM(s) Oral before breakfast  polyethylene glycol 3350 17 Gram(s) Oral daily  senna 2 Tablet(s) Oral at bedtime  sodium chloride 0.9% lock flush 3 milliLiter(s) IV Push every 8 hours  spironolactone 25 milliGRAM(s) Oral daily  tamsulosin 0.4 milliGRAM(s) Oral at bedtime  vancomycin  IVPB      vancomycin  IVPB 1000 milliGRAM(s) IV Intermittent daily                    Physical Therapy Rec:   Home  [  ]   Home w/ PT  [  ]  Rehab  [  ]  Discussed with Cardiothoracic Team at AM rounds.

## 2021-09-15 LAB
ANION GAP SERPL CALC-SCNC: 14 MMOL/L — SIGNIFICANT CHANGE UP (ref 5–17)
BUN SERPL-MCNC: 21 MG/DL — SIGNIFICANT CHANGE UP (ref 7–23)
CALCIUM SERPL-MCNC: 9 MG/DL — SIGNIFICANT CHANGE UP (ref 8.4–10.5)
CHLORIDE SERPL-SCNC: 102 MMOL/L — SIGNIFICANT CHANGE UP (ref 96–108)
CO2 SERPL-SCNC: 21 MMOL/L — LOW (ref 22–31)
CREAT SERPL-MCNC: 1.02 MG/DL — SIGNIFICANT CHANGE UP (ref 0.5–1.3)
GLUCOSE SERPL-MCNC: 168 MG/DL — HIGH (ref 70–99)
HCT VFR BLD CALC: 30.3 % — LOW (ref 39–50)
HCV RNA FLD QL NAA+PROBE: SIGNIFICANT CHANGE UP
HGB BLD-MCNC: 9.3 G/DL — LOW (ref 13–17)
MCHC RBC-ENTMCNC: 28.2 PG — SIGNIFICANT CHANGE UP (ref 27–34)
MCHC RBC-ENTMCNC: 30.7 GM/DL — LOW (ref 32–36)
MCV RBC AUTO: 91.8 FL — SIGNIFICANT CHANGE UP (ref 80–100)
NRBC # BLD: 0 /100 WBCS — SIGNIFICANT CHANGE UP (ref 0–0)
PLATELET # BLD AUTO: 541 K/UL — HIGH (ref 150–400)
POTASSIUM SERPL-MCNC: 3.9 MMOL/L — SIGNIFICANT CHANGE UP (ref 3.5–5.3)
POTASSIUM SERPL-SCNC: 3.9 MMOL/L — SIGNIFICANT CHANGE UP (ref 3.5–5.3)
RBC # BLD: 3.3 M/UL — LOW (ref 4.2–5.8)
RBC # FLD: 15.1 % — HIGH (ref 10.3–14.5)
SODIUM SERPL-SCNC: 137 MMOL/L — SIGNIFICANT CHANGE UP (ref 135–145)
WBC # BLD: 5.15 K/UL — SIGNIFICANT CHANGE UP (ref 3.8–10.5)
WBC # FLD AUTO: 5.15 K/UL — SIGNIFICANT CHANGE UP (ref 3.8–10.5)

## 2021-09-15 PROCEDURE — 99232 SBSQ HOSP IP/OBS MODERATE 35: CPT | Mod: 24

## 2021-09-15 RX ORDER — INSULIN LISPRO 100/ML
10 VIAL (ML) SUBCUTANEOUS
Refills: 0 | Status: DISCONTINUED | OUTPATIENT
Start: 2021-09-15 | End: 2021-09-17

## 2021-09-15 RX ORDER — INSULIN GLARGINE 100 [IU]/ML
22 INJECTION, SOLUTION SUBCUTANEOUS AT BEDTIME
Refills: 0 | Status: DISCONTINUED | OUTPATIENT
Start: 2021-09-15 | End: 2021-09-17

## 2021-09-15 RX ORDER — INSULIN LISPRO 100/ML
9 VIAL (ML) SUBCUTANEOUS
Refills: 0 | Status: DISCONTINUED | OUTPATIENT
Start: 2021-09-15 | End: 2021-09-15

## 2021-09-15 RX ORDER — LINEZOLID 600 MG/300ML
1 INJECTION, SOLUTION INTRAVENOUS
Qty: 28 | Refills: 0
Start: 2021-09-15 | End: 2021-09-28

## 2021-09-15 RX ORDER — INSULIN GLARGINE 100 [IU]/ML
20 INJECTION, SOLUTION SUBCUTANEOUS AT BEDTIME
Refills: 0 | Status: DISCONTINUED | OUTPATIENT
Start: 2021-09-15 | End: 2021-09-15

## 2021-09-15 RX ADMIN — Medication 325 MILLIGRAM(S): at 11:41

## 2021-09-15 RX ADMIN — Medication 100 MILLIGRAM(S): at 22:23

## 2021-09-15 RX ADMIN — PANTOPRAZOLE SODIUM 40 MILLIGRAM(S): 20 TABLET, DELAYED RELEASE ORAL at 05:49

## 2021-09-15 RX ADMIN — Medication 1: at 08:08

## 2021-09-15 RX ADMIN — Medication 5 MILLIGRAM(S): at 22:24

## 2021-09-15 RX ADMIN — Medication 250 MILLIGRAM(S): at 12:39

## 2021-09-15 RX ADMIN — Medication 25 MILLIGRAM(S): at 17:03

## 2021-09-15 RX ADMIN — HEPARIN SODIUM 5000 UNIT(S): 5000 INJECTION INTRAVENOUS; SUBCUTANEOUS at 13:47

## 2021-09-15 RX ADMIN — AMIODARONE HYDROCHLORIDE 200 MILLIGRAM(S): 400 TABLET ORAL at 05:41

## 2021-09-15 RX ADMIN — Medication 1: at 11:40

## 2021-09-15 RX ADMIN — GABAPENTIN 300 MILLIGRAM(S): 400 CAPSULE ORAL at 05:41

## 2021-09-15 RX ADMIN — Medication 9 UNIT(S): at 08:03

## 2021-09-15 RX ADMIN — INSULIN GLARGINE 22 UNIT(S): 100 INJECTION, SOLUTION SUBCUTANEOUS at 22:24

## 2021-09-15 RX ADMIN — Medication 40 MILLIGRAM(S): at 05:41

## 2021-09-15 RX ADMIN — HEPARIN SODIUM 5000 UNIT(S): 5000 INJECTION INTRAVENOUS; SUBCUTANEOUS at 22:28

## 2021-09-15 RX ADMIN — Medication 600 MILLIGRAM(S): at 17:03

## 2021-09-15 RX ADMIN — Medication 600 MILLIGRAM(S): at 05:41

## 2021-09-15 RX ADMIN — Medication 650 MILLIGRAM(S): at 05:49

## 2021-09-15 RX ADMIN — Medication 25 MILLIGRAM(S): at 05:41

## 2021-09-15 RX ADMIN — Medication 10 UNIT(S): at 16:34

## 2021-09-15 RX ADMIN — Medication 100 MILLIGRAM(S): at 05:40

## 2021-09-15 RX ADMIN — FINASTERIDE 5 MILLIGRAM(S): 5 TABLET, FILM COATED ORAL at 11:41

## 2021-09-15 RX ADMIN — HEPARIN SODIUM 5000 UNIT(S): 5000 INJECTION INTRAVENOUS; SUBCUTANEOUS at 05:41

## 2021-09-15 RX ADMIN — ATORVASTATIN CALCIUM 80 MILLIGRAM(S): 80 TABLET, FILM COATED ORAL at 22:24

## 2021-09-15 RX ADMIN — GABAPENTIN 300 MILLIGRAM(S): 400 CAPSULE ORAL at 22:25

## 2021-09-15 RX ADMIN — SODIUM CHLORIDE 3 MILLILITER(S): 9 INJECTION INTRAMUSCULAR; INTRAVENOUS; SUBCUTANEOUS at 21:13

## 2021-09-15 RX ADMIN — TAMSULOSIN HYDROCHLORIDE 0.4 MILLIGRAM(S): 0.4 CAPSULE ORAL at 22:24

## 2021-09-15 RX ADMIN — Medication 1: at 16:33

## 2021-09-15 RX ADMIN — GABAPENTIN 300 MILLIGRAM(S): 400 CAPSULE ORAL at 13:47

## 2021-09-15 RX ADMIN — SPIRONOLACTONE 25 MILLIGRAM(S): 25 TABLET, FILM COATED ORAL at 05:41

## 2021-09-15 RX ADMIN — SODIUM CHLORIDE 3 MILLILITER(S): 9 INJECTION INTRAMUSCULAR; INTRAVENOUS; SUBCUTANEOUS at 05:34

## 2021-09-15 RX ADMIN — Medication 650 MILLIGRAM(S): at 05:42

## 2021-09-15 RX ADMIN — Medication 100 MILLIGRAM(S): at 13:48

## 2021-09-15 RX ADMIN — Medication 9 UNIT(S): at 11:41

## 2021-09-15 RX ADMIN — SODIUM CHLORIDE 3 MILLILITER(S): 9 INJECTION INTRAMUSCULAR; INTRAVENOUS; SUBCUTANEOUS at 12:48

## 2021-09-15 NOTE — PROGRESS NOTE ADULT - PROBLEM SELECTOR PLAN 1
Will increase Lantus to 20u at bedtime.  Will increase Admelog to 9u before each meal and continue Admelog correction scale coverage. Will continue monitoring FS and FU.  Patient counseled for compliance with consistent low carb diet and exercise as tolerated outpatient. Will increase Lantus to 22u at bedtime.  Will increase Admelog to 10u before each meal and continue Admelog correction scale coverage. Will continue monitoring FS and FU.  Patient counseled for compliance with consistent low carb diet and exercise as tolerated outpatient.

## 2021-09-15 NOTE — PROGRESS NOTE ADULT - ASSESSMENT
Assessment  DMT2: 66y Male with DM T2 with hyperglycemia, was on oral meds at home, now on basal bolus insulin, increased dose yesterday, blood sugars are improving though still not at target, no hypoglycemic episodes, eating meals with good appetite, appears comfortable.  CAD: s/p CABG, on medications, stable, monitored.  Wound Infection: On meds, monitored.  HTN: Controlled,  on antihypertensive medications.        Cb Hill MD  Cell: [phone]  Office: [phone]     Assessment  DMT2: 66y Male with DM T2 with hyperglycemia, was on oral meds at home, now on basal bolus insulin, increased dose yesterday, blood sugars are improving though still not at target,  no hypoglycemic episodes, eating meals with good appetite, appears comfortable.  CAD: s/p CABG, on medications, stable, monitored.  Wound Infection: On meds, monitored.  HTN: Controlled,  on antihypertensive medications.        Cb Hill MD  Cell: [phone]  Office: [phone]

## 2021-09-15 NOTE — PROGRESS NOTE ADULT - ASSESSMENT
66 y/o M w/ PMHx of T2DM c/b peripheral neuropathy, HLD, HTN, BPH, and CAD s/p C3L w/ Dr. Valerio 8/27/21 (d/c'd home 9/6) presents to [facility] ED c/o worsening LLE redness, swelling, and pain x 3-4 days. He states that his leg started feeling "hard" and warm today, which prompted him to call the CTS answering service. After d/w Dr. Valerio, decision was made for the pt to come to the ED for further evaluation and likely admission for further workup and management. Additionally, pt reports having an open wound on his L shin that has started to have bloody drainage since he was d/c'd home.     Pt seen and evaluated on arrival to ED - VSS, NAD. Afebrile, no leukocytosis, nontoxic appearing. XR tib/fib showed no cortical erosions or gas tracking. LLE duplex negative for DVT, (+) L greater saphenous vein thrombosis - likely residual as pt had L SVG harvest for CABG, no indication for AC as per Dr. Valerio. BCx x 2 sent in ED - will f/u. D/w Dr. Valerio - will admit pt for further workup and management. Per Dr. Valerio, pt started on Zinacef for ppx - 1st dose given in ED. Will otherwise resume home medication regimen.   9/11 HD stable SR 60's.  Vancomycin added.  Continue zinacef.  Ace wrap LLE.  Neg for DVT  9/12 Premeal/lantus added  Vanco/Zinacef LLE cellulitis>Vanco level after 4th dose  9/13 LLE improved edema, wd with scant serous drainage, Cont abx  9/14 Hyperglycemia, endo consult, increase coverage, Cont iv abx for now. Ace wrap  9/15 VSS,  Insulin requirements increased for hyperglycemia. Continue IV Vanco through 9/17 for 7 day course total. Plan to d/c patient on PO Zyvox x 2 weeks on discharge per Dr. Valerio.

## 2021-09-15 NOTE — PROGRESS NOTE ADULT - SUBJECTIVE AND OBJECTIVE BOX
Chief complaint  Patient is a 66y old  Male who presents with a chief complaint of LLE edema/erythema (14 Sep 2021 13:43)   Review of systems  Patient in bed, looks comfortable, no hypoglycemic episodes.    Labs and Fingersticks  CAPILLARY BLOOD GLUCOSE      POCT Blood Glucose.: 179 mg/dL (15 Sep 2021 07:41)  POCT Blood Glucose.: 170 mg/dL (14 Sep 2021 21:36)  POCT Blood Glucose.: 262 mg/dL (14 Sep 2021 16:29)  POCT Blood Glucose.: 270 mg/dL (14 Sep 2021 11:46)      Anion Gap, Serum: 14 (09-15 @ 06:40)  Anion Gap, Serum: 13 (09-14 @ 05:40)      Calcium, Total Serum: 9.0 (09-15 @ 06:40)  Calcium, Total Serum: 8.9 (09-14 @ 05:40)          09-15    137  |  102  |  21  ----------------------------<  168<H>  3.9   |  21<L>  |  1.02    Ca    9.0      15 Sep 2021 06:40                          9.3    5.15  )-----------( 541      ( 15 Sep 2021 06:40 )             30.3     Medications  MEDICATIONS  (STANDING):  aMIOdarone    Tablet 200 milliGRAM(s) Oral daily  aspirin enteric coated 325 milliGRAM(s) Oral daily  atorvastatin 80 milliGRAM(s) Oral at bedtime  bisacodyl 5 milliGRAM(s) Oral at bedtime  cefuroxime  IVPB      cefuroxime  IVPB 1500 milliGRAM(s) IV Intermittent every 8 hours  dextrose 40% Gel 15 Gram(s) Oral once  dextrose 5%. 1000 milliLiter(s) (50 mL/Hr) IV Continuous <Continuous>  dextrose 5%. 1000 milliLiter(s) (100 mL/Hr) IV Continuous <Continuous>  dextrose 50% Injectable 25 Gram(s) IV Push once  dextrose 50% Injectable 12.5 Gram(s) IV Push once  dextrose 50% Injectable 25 Gram(s) IV Push once  finasteride 5 milliGRAM(s) Oral daily  furosemide    Tablet 40 milliGRAM(s) Oral daily  gabapentin 300 milliGRAM(s) Oral three times a day  gemfibrozil 600 milliGRAM(s) Oral two times a day  glucagon  Injectable 1 milliGRAM(s) IntraMuscular once  heparin   Injectable 5000 Unit(s) SubCutaneous every 8 hours  influenza   Vaccine 0.5 milliLiter(s) IntraMuscular once  insulin glargine Injectable (LANTUS) 20 Unit(s) SubCutaneous at bedtime  insulin lispro (ADMELOG) corrective regimen sliding scale   SubCutaneous three times a day before meals  insulin lispro (ADMELOG) corrective regimen sliding scale   SubCutaneous at bedtime  insulin lispro Injectable (ADMELOG) 9 Unit(s) SubCutaneous three times a day before meals  metoprolol tartrate 25 milliGRAM(s) Oral every 12 hours  pantoprazole    Tablet 40 milliGRAM(s) Oral before breakfast  polyethylene glycol 3350 17 Gram(s) Oral daily  senna 2 Tablet(s) Oral at bedtime  sodium chloride 0.9% lock flush 3 milliLiter(s) IV Push every 8 hours  spironolactone 25 milliGRAM(s) Oral daily  tamsulosin 0.4 milliGRAM(s) Oral at bedtime  vancomycin  IVPB      vancomycin  IVPB 1000 milliGRAM(s) IV Intermittent daily      Physical Exam  General: Patient comfortable in bed  Vital Signs Last 12 Hrs  T(F): 98.1 (09-15-21 @ 04:44), Max: 98.1 (09-15-21 @ 04:44)  HR: 67 (09-15-21 @ 04:44) (67 - 67)  BP: 100/56 (09-15-21 @ 04:44) (100/56 - 100/56)  BP(mean): --  RR: 18 (09-15-21 @ 04:44) (18 - 18)  SpO2: 98% (09-15-21 @ 04:44) (98% - 98%)  Neck: No palpable thyroid nodules.  CVS: S1S2, No murmurs  Respiratory: No wheezing, no crepitations  GI: Abdomen soft, bowel sounds positive  Musculoskeletal:  edema lower extremities.   Skin: No skin rashes, no ecchymosis    Diagnostics             Chief complaint  Patient is a 66y old  Male who presents with a chief complaint of LLE edema/erythema (14 Sep 2021 13:43)   Review of systems  Patient in bed, looks comfortable, no hypoglycemic episodes.    Labs and Fingersticks  CAPILLARY BLOOD GLUCOSE      POCT Blood Glucose.: 179 mg/dL (15 Sep 2021 07:41)  POCT Blood Glucose.: 170 mg/dL (14 Sep 2021 21:36)  POCT Blood Glucose.: 262 mg/dL (14 Sep 2021 16:29)  POCT Blood Glucose.: 270 mg/dL (14 Sep 2021 11:46)      Anion Gap, Serum: 14 (09-15 @ 06:40)  Anion Gap, Serum: 13 (09-14 @ 05:40)      Calcium, Total Serum: 9.0 (09-15 @ 06:40)  Calcium, Total Serum: 8.9 (09-14 @ 05:40)          09-15    137  |  102  |  21  ----------------------------<  168<H>  3.9   |  21<L>  |  1.02    Ca    9.0      15 Sep 2021 06:40                          9.3    5.15  )-----------( 541      ( 15 Sep 2021 06:40 )             30.3     Medications  MEDICATIONS  (STANDING):  aMIOdarone    Tablet 200 milliGRAM(s) Oral daily  aspirin enteric coated 325 milliGRAM(s) Oral daily  atorvastatin 80 milliGRAM(s) Oral at bedtime  bisacodyl 5 milliGRAM(s) Oral at bedtime  cefuroxime  IVPB      cefuroxime  IVPB 1500 milliGRAM(s) IV Intermittent every 8 hours  dextrose 40% Gel 15 Gram(s) Oral once  dextrose 5%. 1000 milliLiter(s) (50 mL/Hr) IV Continuous <Continuous>  dextrose 5%. 1000 milliLiter(s) (100 mL/Hr) IV Continuous <Continuous>  dextrose 50% Injectable 25 Gram(s) IV Push once  dextrose 50% Injectable 12.5 Gram(s) IV Push once  dextrose 50% Injectable 25 Gram(s) IV Push once  finasteride 5 milliGRAM(s) Oral daily  furosemide    Tablet 40 milliGRAM(s) Oral daily  gabapentin 300 milliGRAM(s) Oral three times a day  gemfibrozil 600 milliGRAM(s) Oral two times a day  glucagon  Injectable 1 milliGRAM(s) IntraMuscular once  heparin   Injectable 5000 Unit(s) SubCutaneous every 8 hours  influenza   Vaccine 0.5 milliLiter(s) IntraMuscular once  insulin glargine Injectable (LANTUS) 20 Unit(s) SubCutaneous at bedtime  insulin lispro (ADMELOG) corrective regimen sliding scale   SubCutaneous three times a day before meals  insulin lispro (ADMELOG) corrective regimen sliding scale   SubCutaneous at bedtime  insulin lispro Injectable (ADMELOG) 9 Unit(s) SubCutaneous three times a day before meals  metoprolol tartrate 25 milliGRAM(s) Oral every 12 hours  pantoprazole    Tablet 40 milliGRAM(s) Oral before breakfast  polyethylene glycol 3350 17 Gram(s) Oral daily  senna 2 Tablet(s) Oral at bedtime  sodium chloride 0.9% lock flush 3 milliLiter(s) IV Push every 8 hours  spironolactone 25 milliGRAM(s) Oral daily  tamsulosin 0.4 milliGRAM(s) Oral at bedtime  vancomycin  IVPB      vancomycin  IVPB 1000 milliGRAM(s) IV Intermittent daily      Physical Exam  General: Patient comfortable in bed  Vital Signs Last 12 Hrs  T(F): 98.1 (09-15-21 @ 04:44), Max: 98.1 (09-15-21 @ 04:44)  HR: 67 (09-15-21 @ 04:44) (67 - 67)  BP: 100/56 (09-15-21 @ 04:44) (100/56 - 100/56)  BP(mean): --  RR: 18 (09-15-21 @ 04:44) (18 - 18)  SpO2: 98% (09-15-21 @ 04:44) (98% - 98%)  Neck: No palpable thyroid nodules.  CVS: S1S2, No murmurs  Respiratory: No wheezing, no crepitations  GI: Abdomen soft, bowel sounds positive  Musculoskeletal:  edema lower extremities.   Skin: No skin rashes, no ecchymosis    Diagnostics

## 2021-09-15 NOTE — PROGRESS NOTE ADULT - SUBJECTIVE AND OBJECTIVE BOX
Subjective: Pt states "Hello" denies any CP or SOB. No acute events overnight.     Telemetry:  SR 60 - 80  Vital Signs Last 24 Hrs  T(C): 37 (09-15-21 @ 11:34), Max: 37 (09-15-21 @ 11:34)  T(F): 98.6 (09-15-21 @ 11:34), Max: 98.6 (09-15-21 @ 11:34)  HR: 67 (09-15-21 @ 11:34) (67 - 76)  BP: 113/65 (09-15-21 @ 11:34) (100/56 - 119/89)  RR: 18 (09-15-21 @ 11:34) (18 - 18)  SpO2: 99% (09-15-21 @ 11:34) (98% - 99%)              @ 07:01  -  09-15 @ 07:00  --------------------------------------------------------  IN: 1420 mL / OUT: 2800 mL / NET: -1380 mL           Daily Weight in k (15 Sep 2021 08:22)                        9.3    5.15  )-----------( 541      ( 15 Sep 2021 06:40 )             30.3     137  |  102  |  21  ----------------------------<  168<H>  3.9   |  21<L>  |  1.02            CAPILLARY BLOOD GLUCOSE  196 - 167          PHYSICAL EXAM  Neurology: A&Ox3, NAD  CV : RRR+S1S2  Sternal Wound: MSI CDI YAYO, Stable  Lungs: Respirations non-labored, B/L BS CTA  Abdomen: Soft, NT/ND, +BSx4Q, last BM 9/15 (-)N/V/D  : Voiding without difficulty  Extremities: B/L LE no edema, negative calf tenderness, +PP, LLE SVG incision CDI YAYO            MEDICATIONS  acetaminophen   Tablet .. 650 milliGRAM(s) Oral every 6 hours PRN  aMIOdarone    Tablet 200 milliGRAM(s) Oral daily  aspirin enteric coated 325 milliGRAM(s) Oral daily  atorvastatin 80 milliGRAM(s) Oral at bedtime  bisacodyl 5 milliGRAM(s) Oral at bedtime   cefuroxime  IVPB 1500 milliGRAM(s) IV Intermittent every 8 hours  finasteride 5 milliGRAM(s) Oral daily  furosemide    Tablet 40 milliGRAM(s) Oral daily  gabapentin 300 milliGRAM(s) Oral three times a day  gemfibrozil 600 milliGRAM(s) Oral two times a day  glucagon  Injectable 1 milliGRAM(s) IntraMuscular once  heparin   Injectable 5000 Unit(s) SubCutaneous every 8 hours  influenza   Vaccine 0.5 milliLiter(s) IntraMuscular once  insulin glargine Injectable (LANTUS) 22 Unit(s) SubCutaneous at bedtime  insulin lispro (ADMELOG) corrective regimen sliding scale   SubCutaneous three times a day before meals  insulin lispro (ADMELOG) corrective regimen sliding scale   SubCutaneous at bedtime  insulin lispro Injectable (ADMELOG) 10 Unit(s) SubCutaneous three times a day before meals  metoprolol tartrate 25 milliGRAM(s) Oral every 12 hours  pantoprazole    Tablet 40 milliGRAM(s) Oral before breakfast  polyethylene glycol 3350 17 Gram(s) Oral daily  senna 2 Tablet(s) Oral at bedtime  sodium chloride 0.9% lock flush 3 milliLiter(s) IV Push every 8 hours  spironolactone 25 milliGRAM(s) Oral daily  tamsulosin 0.4 milliGRAM(s) Oral at bedtime  vancomycin  IVPB      vancomycin  IVPB 1000 milliGRAM(s) IV Intermittent daily      Discussed with Cardiothoracic Team at AM rounds.

## 2021-09-16 LAB
ANION GAP SERPL CALC-SCNC: 12 MMOL/L — SIGNIFICANT CHANGE UP (ref 5–17)
BUN SERPL-MCNC: 22 MG/DL — SIGNIFICANT CHANGE UP (ref 7–23)
CALCIUM SERPL-MCNC: 9.6 MG/DL — SIGNIFICANT CHANGE UP (ref 8.4–10.5)
CHLORIDE SERPL-SCNC: 105 MMOL/L — SIGNIFICANT CHANGE UP (ref 96–108)
CO2 SERPL-SCNC: 20 MMOL/L — LOW (ref 22–31)
CREAT SERPL-MCNC: 0.98 MG/DL — SIGNIFICANT CHANGE UP (ref 0.5–1.3)
CULTURE RESULTS: SIGNIFICANT CHANGE UP
CULTURE RESULTS: SIGNIFICANT CHANGE UP
GLUCOSE SERPL-MCNC: 105 MG/DL — HIGH (ref 70–99)
HCT VFR BLD CALC: 32.5 % — LOW (ref 39–50)
HGB BLD-MCNC: 10.2 G/DL — LOW (ref 13–17)
MCHC RBC-ENTMCNC: 29.1 PG — SIGNIFICANT CHANGE UP (ref 27–34)
MCHC RBC-ENTMCNC: 31.4 GM/DL — LOW (ref 32–36)
MCV RBC AUTO: 92.6 FL — SIGNIFICANT CHANGE UP (ref 80–100)
NRBC # BLD: 0 /100 WBCS — SIGNIFICANT CHANGE UP (ref 0–0)
PLATELET # BLD AUTO: 582 K/UL — HIGH (ref 150–400)
POTASSIUM SERPL-MCNC: 3.9 MMOL/L — SIGNIFICANT CHANGE UP (ref 3.5–5.3)
POTASSIUM SERPL-SCNC: 3.9 MMOL/L — SIGNIFICANT CHANGE UP (ref 3.5–5.3)
RBC # BLD: 3.51 M/UL — LOW (ref 4.2–5.8)
RBC # FLD: 14.8 % — HIGH (ref 10.3–14.5)
SODIUM SERPL-SCNC: 137 MMOL/L — SIGNIFICANT CHANGE UP (ref 135–145)
SPECIMEN SOURCE: SIGNIFICANT CHANGE UP
SPECIMEN SOURCE: SIGNIFICANT CHANGE UP
WBC # BLD: 5.14 K/UL — SIGNIFICANT CHANGE UP (ref 3.8–10.5)
WBC # FLD AUTO: 5.14 K/UL — SIGNIFICANT CHANGE UP (ref 3.8–10.5)

## 2021-09-16 PROCEDURE — 99232 SBSQ HOSP IP/OBS MODERATE 35: CPT | Mod: 24

## 2021-09-16 RX ADMIN — Medication 40 MILLIGRAM(S): at 05:37

## 2021-09-16 RX ADMIN — GABAPENTIN 300 MILLIGRAM(S): 400 CAPSULE ORAL at 14:32

## 2021-09-16 RX ADMIN — Medication 250 MILLIGRAM(S): at 14:29

## 2021-09-16 RX ADMIN — SODIUM CHLORIDE 3 MILLILITER(S): 9 INJECTION INTRAMUSCULAR; INTRAVENOUS; SUBCUTANEOUS at 22:00

## 2021-09-16 RX ADMIN — Medication 600 MILLIGRAM(S): at 05:37

## 2021-09-16 RX ADMIN — Medication 5 MILLIGRAM(S): at 22:21

## 2021-09-16 RX ADMIN — Medication 650 MILLIGRAM(S): at 09:45

## 2021-09-16 RX ADMIN — Medication 100 MILLIGRAM(S): at 05:38

## 2021-09-16 RX ADMIN — FINASTERIDE 5 MILLIGRAM(S): 5 TABLET, FILM COATED ORAL at 14:32

## 2021-09-16 RX ADMIN — Medication 650 MILLIGRAM(S): at 08:54

## 2021-09-16 RX ADMIN — SODIUM CHLORIDE 3 MILLILITER(S): 9 INJECTION INTRAMUSCULAR; INTRAVENOUS; SUBCUTANEOUS at 14:28

## 2021-09-16 RX ADMIN — HEPARIN SODIUM 5000 UNIT(S): 5000 INJECTION INTRAVENOUS; SUBCUTANEOUS at 14:41

## 2021-09-16 RX ADMIN — GABAPENTIN 300 MILLIGRAM(S): 400 CAPSULE ORAL at 05:37

## 2021-09-16 RX ADMIN — Medication 600 MILLIGRAM(S): at 17:52

## 2021-09-16 RX ADMIN — SODIUM CHLORIDE 3 MILLILITER(S): 9 INJECTION INTRAMUSCULAR; INTRAVENOUS; SUBCUTANEOUS at 05:05

## 2021-09-16 RX ADMIN — Medication 325 MILLIGRAM(S): at 14:33

## 2021-09-16 RX ADMIN — HEPARIN SODIUM 5000 UNIT(S): 5000 INJECTION INTRAVENOUS; SUBCUTANEOUS at 05:43

## 2021-09-16 RX ADMIN — Medication 10 UNIT(S): at 16:52

## 2021-09-16 RX ADMIN — PANTOPRAZOLE SODIUM 40 MILLIGRAM(S): 20 TABLET, DELAYED RELEASE ORAL at 06:15

## 2021-09-16 RX ADMIN — Medication 25 MILLIGRAM(S): at 17:53

## 2021-09-16 RX ADMIN — HEPARIN SODIUM 5000 UNIT(S): 5000 INJECTION INTRAVENOUS; SUBCUTANEOUS at 22:27

## 2021-09-16 RX ADMIN — AMIODARONE HYDROCHLORIDE 200 MILLIGRAM(S): 400 TABLET ORAL at 05:37

## 2021-09-16 RX ADMIN — TAMSULOSIN HYDROCHLORIDE 0.4 MILLIGRAM(S): 0.4 CAPSULE ORAL at 22:21

## 2021-09-16 RX ADMIN — GABAPENTIN 300 MILLIGRAM(S): 400 CAPSULE ORAL at 22:21

## 2021-09-16 RX ADMIN — ATORVASTATIN CALCIUM 80 MILLIGRAM(S): 80 TABLET, FILM COATED ORAL at 22:21

## 2021-09-16 RX ADMIN — Medication 25 MILLIGRAM(S): at 05:38

## 2021-09-16 RX ADMIN — Medication 100 MILLIGRAM(S): at 22:24

## 2021-09-16 RX ADMIN — POLYETHYLENE GLYCOL 3350 17 GRAM(S): 17 POWDER, FOR SOLUTION ORAL at 14:32

## 2021-09-16 RX ADMIN — Medication 100 MILLIGRAM(S): at 14:41

## 2021-09-16 RX ADMIN — Medication 10 UNIT(S): at 11:42

## 2021-09-16 RX ADMIN — Medication 10 UNIT(S): at 08:13

## 2021-09-16 RX ADMIN — INSULIN GLARGINE 22 UNIT(S): 100 INJECTION, SOLUTION SUBCUTANEOUS at 22:21

## 2021-09-16 RX ADMIN — SPIRONOLACTONE 25 MILLIGRAM(S): 25 TABLET, FILM COATED ORAL at 05:37

## 2021-09-16 NOTE — PROGRESS NOTE ADULT - SUBJECTIVE AND OBJECTIVE BOX
Subjective: Pt states "Hello" denies any CP or SOB. No acute events overnight.     Telemetry:    Vital Signs Last 24 Hrs  T(C): 36.8 (21 @ 04:41), Max: 37 (09-15-21 @ 11:34)  T(F): 98.2 (21 @ 04:41), Max: 98.6 (09-15-21 @ 11:34)  HR: 66 (21 @ 04:41) (63 - 67)  BP: 106/63 (21 @ 04:41) (106/63 - 113/65)  RR: 18 (21 @ 04:41) (18 - 18)  SpO2: 98% (21 @ 04:41) (98% - 100%)             09-15 @ 07:01  -   @ 07:00  --------------------------------------------------------  IN: 1500 mL / OUT: 3200 mL / NET: -1700 mL           Daily Weight in k (16 Sep 2021 08:11)                        10.2   5.14  )-----------( 582      ( 16 Sep 2021 06:05 )             32.5     137  |  105  |  22  ----------------------------<  105<H>  3.9   |  20<L>  |  0.98          CAPILLARY BLOOD GLUCOSE  109 - 167            PHYSICAL EXAM  Neurology: A&Ox3, NAD  CV : RRR+S1S2  Sternal Wound: MSI CDI YAYO, Stable  Lungs: Respirations non-labored, B/L BS CTA  Abdomen: Soft, NT/ND, +BSx4Q, last BM 9/15 (-)N/V/D  : Voiding without difficulty  Extremities: B/L LE no edema, negative calf tenderness, +PP, +LLE SVG incision CDI YAYO            MEDICATIONS  acetaminophen   Tablet .. 650 milliGRAM(s) Oral every 6 hours PRN  aMIOdarone    Tablet 200 milliGRAM(s) Oral daily  aspirin enteric coated 325 milliGRAM(s) Oral daily  atorvastatin 80 milliGRAM(s) Oral at bedtime  bisacodyl 5 milliGRAM(s) Oral at bedtime  cefuroxime  IVPB 1500 milliGRAM(s) IV Intermittent every 8 hours  finasteride 5 milliGRAM(s) Oral daily  furosemide    Tablet 40 milliGRAM(s) Oral daily  gabapentin 300 milliGRAM(s) Oral three times a day  gemfibrozil 600 milliGRAM(s) Oral two times a day  glucagon  Injectable 1 milliGRAM(s) IntraMuscular once  heparin   Injectable 5000 Unit(s) SubCutaneous every 8 hours  influenza   Vaccine 0.5 milliLiter(s) IntraMuscular once  insulin glargine Injectable (LANTUS) 22 Unit(s) SubCutaneous at bedtime  insulin lispro (ADMELOG) corrective regimen sliding scale   SubCutaneous three times a day before meals  insulin lispro (ADMELOG) corrective regimen sliding scale   SubCutaneous at bedtime  insulin lispro Injectable (ADMELOG) 10 Unit(s) SubCutaneous three times a day before meals  metoprolol tartrate 25 milliGRAM(s) Oral every 12 hours  pantoprazole    Tablet 40 milliGRAM(s) Oral before breakfast  polyethylene glycol 3350 17 Gram(s) Oral daily  senna 2 Tablet(s) Oral at bedtime  sodium chloride 0.9% lock flush 3 milliLiter(s) IV Push every 8 hours  spironolactone 25 milliGRAM(s) Oral daily  tamsulosin 0.4 milliGRAM(s) Oral at bedtime  vancomycin  IVPB 1000 milliGRAM(s) IV Intermittent daily        Discussed with Cardiothoracic Team at AM rounds. Subjective: Pt states "Hello" denies any CP or SOB. No acute events overnight.     Telemetry:  SR 66  Vital Signs Last 24 Hrs  T(C): 36.8 (21 @ 04:41), Max: 37 (09-15-21 @ 11:34)  T(F): 98.2 (21 @ 04:41), Max: 98.6 (09-15-21 @ 11:34)  HR: 66 (21 @ 04:41) (63 - 67)  BP: 106/63 (21 @ 04:41) (106/63 - 113/65)  RR: 18 (21 @ 04:41) (18 - 18)  SpO2: 98% (21 @ 04:41) (98% - 100%)             09-15 @ 07:01  -   @ 07:00  --------------------------------------------------------  IN: 1500 mL / OUT: 3200 mL / NET: -1700 mL           Daily Weight in k (16 Sep 2021 08:11)                        10.2   5.14  )-----------( 582      ( 16 Sep 2021 06:05 )             32.5     137  |  105  |  22  ----------------------------<  105<H>  3.9   |  20<L>  |  0.98          CAPILLARY BLOOD GLUCOSE  109 - 167            PHYSICAL EXAM  Neurology: A&Ox3, NAD  CV : RRR+S1S2  Sternal Wound: MSI CDI YAYO, Stable  Lungs: Respirations non-labored, B/L BS CTA  Abdomen: Soft, NT/ND, +BSx4Q, last BM 9/15 (-)N/V/D  : Voiding without difficulty  Extremities: B/L LE no edema, negative calf tenderness, +PP, +LLE ACE wrapped +SVG incision CDI YAYO            MEDICATIONS  acetaminophen   Tablet .. 650 milliGRAM(s) Oral every 6 hours PRN  aMIOdarone    Tablet 200 milliGRAM(s) Oral daily  aspirin enteric coated 325 milliGRAM(s) Oral daily  atorvastatin 80 milliGRAM(s) Oral at bedtime  bisacodyl 5 milliGRAM(s) Oral at bedtime  cefuroxime  IVPB 1500 milliGRAM(s) IV Intermittent every 8 hours  finasteride 5 milliGRAM(s) Oral daily  furosemide    Tablet 40 milliGRAM(s) Oral daily  gabapentin 300 milliGRAM(s) Oral three times a day  gemfibrozil 600 milliGRAM(s) Oral two times a day  glucagon  Injectable 1 milliGRAM(s) IntraMuscular once  heparin   Injectable 5000 Unit(s) SubCutaneous every 8 hours  influenza   Vaccine 0.5 milliLiter(s) IntraMuscular once  insulin glargine Injectable (LANTUS) 22 Unit(s) SubCutaneous at bedtime  insulin lispro (ADMELOG) corrective regimen sliding scale   SubCutaneous three times a day before meals  insulin lispro (ADMELOG) corrective regimen sliding scale   SubCutaneous at bedtime  insulin lispro Injectable (ADMELOG) 10 Unit(s) SubCutaneous three times a day before meals  metoprolol tartrate 25 milliGRAM(s) Oral every 12 hours  pantoprazole    Tablet 40 milliGRAM(s) Oral before breakfast  polyethylene glycol 3350 17 Gram(s) Oral daily  senna 2 Tablet(s) Oral at bedtime  sodium chloride 0.9% lock flush 3 milliLiter(s) IV Push every 8 hours  spironolactone 25 milliGRAM(s) Oral daily  tamsulosin 0.4 milliGRAM(s) Oral at bedtime  vancomycin  IVPB 1000 milliGRAM(s) IV Intermittent daily        Discussed with Cardiothoracic Team at AM rounds.

## 2021-09-16 NOTE — PROGRESS NOTE ADULT - PROBLEM SELECTOR PLAN 3
LLE duplex 9/10: negative for DVT, (+) L greater saphenous vein thrombosis - likely residual as pt had L SVG harvest  No indication for AC as per Dr. Valerio  F/u BCx x 2  Continue Zinacef 1500 mg IV q8h as ppx as per Dr. Valerio   Vancomycin 1g Q 24 check trough with 3rd dose  Continue to monitor LLE duplex 9/10: negative for DVT, (+) L greater saphenous vein thrombosis - likely residual as pt had L SVG harvest  9/11 BCx2 NGTD  Continue Vancomycin 1g Q 24h as per Dr. Valerio x 7 days till 9/17  Plan to d/c patient on PO Zyvox 600mg BID x 2 weeks per Dr. Valerio after completing IV Vanco. Script sent to [facility]. LLE duplex 9/10: negative for DVT, (+) L greater saphenous vein thrombosis - likely residual as pt had L SVG harvest  9/11 BCx2 NGTD  Continue Zinacef 1500 mg IV q8h as ppx  Continue Vancomycin 1g Q 24h as per Dr. Valerio x 7 days till 9/17  Plan to d/c patient on PO Zyvox 600mg BID x 2 weeks per Dr. Valerio after completing IV Vanco. Script sent to [facility].

## 2021-09-16 NOTE — PROGRESS NOTE ADULT - SUBJECTIVE AND OBJECTIVE BOX
Chief complaint    Patient is a 66y old  Male who presents with a chief complaint of LLE edema/erythema (15 Sep 2021 16:53)   Review of systems  Patient in bed, appears comfortable.    Labs and Fingersticks  CAPILLARY BLOOD GLUCOSE      POCT Blood Glucose.: 109 mg/dL (16 Sep 2021 07:46)  POCT Blood Glucose.: 167 mg/dL (15 Sep 2021 22:09)  POCT Blood Glucose.: 167 mg/dL (15 Sep 2021 16:31)  POCT Blood Glucose.: 196 mg/dL (15 Sep 2021 11:35)      Anion Gap, Serum: 12 (09-16 @ 06:05)  Anion Gap, Serum: 14 (09-15 @ 06:40)      Calcium, Total Serum: 9.6 (09-16 @ 06:05)  Calcium, Total Serum: 9.0 (09-15 @ 06:40)          09-16    137  |  105  |  22  ----------------------------<  105<H>  3.9   |  20<L>  |  0.98    Ca    9.6      16 Sep 2021 06:05                          10.2   5.14  )-----------( 582      ( 16 Sep 2021 06:05 )             32.5     Medications  MEDICATIONS  (STANDING):  aMIOdarone    Tablet 200 milliGRAM(s) Oral daily  aspirin enteric coated 325 milliGRAM(s) Oral daily  atorvastatin 80 milliGRAM(s) Oral at bedtime  bisacodyl 5 milliGRAM(s) Oral at bedtime  cefuroxime  IVPB      cefuroxime  IVPB 1500 milliGRAM(s) IV Intermittent every 8 hours  dextrose 40% Gel 15 Gram(s) Oral once  dextrose 5%. 1000 milliLiter(s) (50 mL/Hr) IV Continuous <Continuous>  dextrose 5%. 1000 milliLiter(s) (100 mL/Hr) IV Continuous <Continuous>  dextrose 50% Injectable 25 Gram(s) IV Push once  dextrose 50% Injectable 12.5 Gram(s) IV Push once  dextrose 50% Injectable 25 Gram(s) IV Push once  finasteride 5 milliGRAM(s) Oral daily  furosemide    Tablet 40 milliGRAM(s) Oral daily  gabapentin 300 milliGRAM(s) Oral three times a day  gemfibrozil 600 milliGRAM(s) Oral two times a day  glucagon  Injectable 1 milliGRAM(s) IntraMuscular once  heparin   Injectable 5000 Unit(s) SubCutaneous every 8 hours  influenza   Vaccine 0.5 milliLiter(s) IntraMuscular once  insulin glargine Injectable (LANTUS) 22 Unit(s) SubCutaneous at bedtime  insulin lispro (ADMELOG) corrective regimen sliding scale   SubCutaneous three times a day before meals  insulin lispro (ADMELOG) corrective regimen sliding scale   SubCutaneous at bedtime  insulin lispro Injectable (ADMELOG) 10 Unit(s) SubCutaneous three times a day before meals  metoprolol tartrate 25 milliGRAM(s) Oral every 12 hours  pantoprazole    Tablet 40 milliGRAM(s) Oral before breakfast  polyethylene glycol 3350 17 Gram(s) Oral daily  senna 2 Tablet(s) Oral at bedtime  sodium chloride 0.9% lock flush 3 milliLiter(s) IV Push every 8 hours  spironolactone 25 milliGRAM(s) Oral daily  tamsulosin 0.4 milliGRAM(s) Oral at bedtime  vancomycin  IVPB 1000 milliGRAM(s) IV Intermittent daily  vancomycin  IVPB          Physical Exam  General: Patient comfortable in bed  Vital Signs Last 12 Hrs  T(F): 98.2 (09-16-21 @ 04:41), Max: 98.2 (09-16-21 @ 04:41)  HR: 66 (09-16-21 @ 04:41) (66 - 66)  BP: 106/63 (09-16-21 @ 04:41) (106/63 - 106/63)  BP(mean): --  RR: 18 (09-16-21 @ 04:41) (18 - 18)  SpO2: 98% (09-16-21 @ 04:41) (98% - 98%)  Neck: No palpable thyroid nodules.  CVS: S1S2, No murmurs  Respiratory: No wheezing, no crepitations  GI: Abdomen soft, bowel sounds positive  Musculoskeletal:  edema lower extremities.     Diagnostics

## 2021-09-16 NOTE — PROGRESS NOTE ADULT - PROBLEM SELECTOR PLAN 1
Will continue current insulin regimen for now. Will continue monitoring  blood sugars, will Follow up.  Patient counseled for compliance with consistent low carb diet.

## 2021-09-16 NOTE — PROGRESS NOTE ADULT - PROBLEM SELECTOR PLAN 2
Continue post-op care   Continue  mg QD, Lopressor 25 mg q12h, and Atorvastatin 80 mg qhs   Continue Amio 200 mg QD   Continue gentle diuresis w/ Lasix 40 mg PO QD & Aldactone 25 mg QD   Increase activity as tolerated, ambulate 4x daily   Encourage incentive spirometry   Wound care and assessment Continue  mg QD, Lopressor 25 mg q12h, and Atorvastatin 80 mg qhs   Continue Amio 200 mg QD   Continue gentle diuresis w/ Lasix 40 mg PO QD & Aldactone 25 mg QD   Increase activity as tolerated, ambulate 4x daily   Encourage incentive spirometry   Wound care and assessment

## 2021-09-16 NOTE — PROGRESS NOTE ADULT - ASSESSMENT
66 y/o M w/ PMHx of T2DM c/b peripheral neuropathy, HLD, HTN, BPH, and CAD s/p C3L w/ Dr. Valerio 8/27/21 (d/c'd home 9/6) presents to [facility] ED c/o worsening LLE redness, swelling, and pain x 3-4 days. He states that his leg started feeling "hard" and warm today, which prompted him to call the CTS answering service. After d/w Dr. Valerio, decision was made for the pt to come to the ED for further evaluation and likely admission for further workup and management. Additionally, pt reports having an open wound on his L shin that has started to have bloody drainage since he was d/c'd home.     Pt seen and evaluated on arrival to ED - VSS, NAD. Afebrile, no leukocytosis, nontoxic appearing. XR tib/fib showed no cortical erosions or gas tracking. LLE duplex negative for DVT, (+) L greater saphenous vein thrombosis - likely residual as pt had L SVG harvest for CABG, no indication for AC as per Dr. Valerio. BCx x 2 sent in ED - will f/u. D/w Dr. Valerio - will admit pt for further workup and management. Per Dr. Valerio, pt started on Zinacef for ppx - 1st dose given in ED. Will otherwise resume home medication regimen.   9/11 HD stable SR 60's.  Vancomycin added.  Continue zinacef.  Ace wrap LLE.  Neg for DVT  9/12 Premeal/lantus added  Vanco/Zinacef LLE cellulitis>Vanco level after 4th dose  9/13 LLE improved edema, wd with scant serous drainage, Cont abx  9/14 Hyperglycemia, endo consult, increase coverage, Cont iv abx for now. Ace wrap  9/15 VSS,  Insulin requirements increased for hyperglycemia. Continue IV Vanco through 9/17 for 7 day course total. Plan to d/c patient on PO Zyvox x 2 weeks on discharge per Dr. Valerio. 64 y/o M w/ PMHx of T2DM c/b peripheral neuropathy, HLD, HTN, BPH, and CAD s/p C3L w/ Dr. Valerio 8/27/21 (d/c'd home 9/6) presents to [facility] ED c/o worsening LLE redness, swelling, and pain x 3-4 days. He states that his leg started feeling "hard" and warm today, which prompted him to call the CTS answering service. After d/w Dr. Valerio, decision was made for the pt to come to the ED for further evaluation and likely admission for further workup and management. Additionally, pt reports having an open wound on his L shin that has started to have bloody drainage since he was d/c'd home.     Pt seen and evaluated on arrival to ED - VSS, NAD. Afebrile, no leukocytosis, nontoxic appearing. XR tib/fib showed no cortical erosions or gas tracking. LLE duplex negative for DVT, (+) L greater saphenous vein thrombosis - likely residual as pt had L SVG harvest for CABG, no indication for AC as per Dr. Valerio. BCx x 2 sent in ED - will f/u. D/w Dr. Valerio - will admit pt for further workup and management. Per Dr. Valerio, pt started on Zinacef for ppx - 1st dose given in ED. Will otherwise resume home medication regimen.   9/11 HD stable SR 60's.  Vancomycin added.  Continue zinacef.  Ace wrap LLE.  Neg for DVT  9/12 Premeal/lantus added  Vanco/Zinacef LLE cellulitis>Vanco level after 4th dose  9/13 LLE improved edema, wd with scant serous drainage, Cont abx  9/14 Hyperglycemia, endo consult, increase coverage, Cont iv abx for now. Ace wrap  9/15 VSS,  Insulin requirements increased for hyperglycemia. Continue IV Vanco through 9/17 for 7 day course total. Plan to d/c patient on PO Zyvox x 2 weeks on d/c per Dr. Valerio.  9/16 VSS, FS better controlled 109 - 167, continue on IV Vanco daily and plan to d/c home on po zyvox after 7 day course.

## 2021-09-16 NOTE — PROGRESS NOTE ADULT - PROBLEM SELECTOR PLAN 1
Diabetic Diet  SSI  Premeal /lantus initiated (oral agents at home) Endocrine following, Dr. Hill  Continue Diabetic Diet  Continue Lantus 22 HS and Admelog 10 TID  Check FS AC/HS  Insulin teaching? Endocrine following, Dr. Hill  Continue Diabetic Diet  Continue Lantus 22 HS and Admelog 10 TID  Check FS AC/HS  Continue Insulin teaching

## 2021-09-16 NOTE — PROGRESS NOTE ADULT - ASSESSMENT
Assessment  DMT2: 66y Male with DM T2 with hyperglycemia, was on oral meds at home, now on basal bolus insulin, increased dose yesterday, blood sugars are improving, no hypoglycemic episodes, eating meals with good appetite, appears comfortable.  CAD: s/p CABG, on medications, stable, monitored.  Wound Infection: On meds, monitored.  HTN: Controlled,  on antihypertensive medications.        Cb Hill MD  Cell: [phone]  Office: [phone]

## 2021-09-17 ENCOUNTER — TRANSCRIPTION ENCOUNTER (OUTPATIENT)
Age: 66
End: 2021-09-17

## 2021-09-17 LAB
ANION GAP SERPL CALC-SCNC: 13 MMOL/L — SIGNIFICANT CHANGE UP (ref 5–17)
BUN SERPL-MCNC: 20 MG/DL — SIGNIFICANT CHANGE UP (ref 7–23)
CALCIUM SERPL-MCNC: 8.9 MG/DL — SIGNIFICANT CHANGE UP (ref 8.4–10.5)
CHLORIDE SERPL-SCNC: 104 MMOL/L — SIGNIFICANT CHANGE UP (ref 96–108)
CO2 SERPL-SCNC: 21 MMOL/L — LOW (ref 22–31)
CREAT SERPL-MCNC: 0.98 MG/DL — SIGNIFICANT CHANGE UP (ref 0.5–1.3)
GLUCOSE SERPL-MCNC: 138 MG/DL — HIGH (ref 70–99)
HCT VFR BLD CALC: 31 % — LOW (ref 39–50)
HGB BLD-MCNC: 9.8 G/DL — LOW (ref 13–17)
MCHC RBC-ENTMCNC: 28.6 PG — SIGNIFICANT CHANGE UP (ref 27–34)
MCHC RBC-ENTMCNC: 31.6 GM/DL — LOW (ref 32–36)
MCV RBC AUTO: 90.4 FL — SIGNIFICANT CHANGE UP (ref 80–100)
NRBC # BLD: 0 /100 WBCS — SIGNIFICANT CHANGE UP (ref 0–0)
PLATELET # BLD AUTO: 531 K/UL — HIGH (ref 150–400)
POTASSIUM SERPL-MCNC: 3.8 MMOL/L — SIGNIFICANT CHANGE UP (ref 3.5–5.3)
POTASSIUM SERPL-SCNC: 3.8 MMOL/L — SIGNIFICANT CHANGE UP (ref 3.5–5.3)
RBC # BLD: 3.43 M/UL — LOW (ref 4.2–5.8)
RBC # FLD: 14.8 % — HIGH (ref 10.3–14.5)
SODIUM SERPL-SCNC: 138 MMOL/L — SIGNIFICANT CHANGE UP (ref 135–145)
WBC # BLD: 5.09 K/UL — SIGNIFICANT CHANGE UP (ref 3.8–10.5)
WBC # FLD AUTO: 5.09 K/UL — SIGNIFICANT CHANGE UP (ref 3.8–10.5)

## 2021-09-17 PROCEDURE — 99024 POSTOP FOLLOW-UP VISIT: CPT

## 2021-09-17 RX ORDER — GLIMEPIRIDE 1 MG
2 TABLET ORAL
Refills: 0 | Status: DISCONTINUED | OUTPATIENT
Start: 2021-09-17 | End: 2021-09-20

## 2021-09-17 RX ADMIN — SODIUM CHLORIDE 3 MILLILITER(S): 9 INJECTION INTRAMUSCULAR; INTRAVENOUS; SUBCUTANEOUS at 22:45

## 2021-09-17 RX ADMIN — Medication 250 MILLIGRAM(S): at 11:18

## 2021-09-17 RX ADMIN — GABAPENTIN 300 MILLIGRAM(S): 400 CAPSULE ORAL at 05:44

## 2021-09-17 RX ADMIN — HEPARIN SODIUM 5000 UNIT(S): 5000 INJECTION INTRAVENOUS; SUBCUTANEOUS at 05:45

## 2021-09-17 RX ADMIN — Medication 25 MILLIGRAM(S): at 05:44

## 2021-09-17 RX ADMIN — Medication 600 MILLIGRAM(S): at 17:23

## 2021-09-17 RX ADMIN — Medication 1: at 07:59

## 2021-09-17 RX ADMIN — HEPARIN SODIUM 5000 UNIT(S): 5000 INJECTION INTRAVENOUS; SUBCUTANEOUS at 21:55

## 2021-09-17 RX ADMIN — HEPARIN SODIUM 5000 UNIT(S): 5000 INJECTION INTRAVENOUS; SUBCUTANEOUS at 13:38

## 2021-09-17 RX ADMIN — TAMSULOSIN HYDROCHLORIDE 0.4 MILLIGRAM(S): 0.4 CAPSULE ORAL at 21:55

## 2021-09-17 RX ADMIN — Medication 100 MILLIGRAM(S): at 05:45

## 2021-09-17 RX ADMIN — SODIUM CHLORIDE 3 MILLILITER(S): 9 INJECTION INTRAMUSCULAR; INTRAVENOUS; SUBCUTANEOUS at 13:39

## 2021-09-17 RX ADMIN — ATORVASTATIN CALCIUM 80 MILLIGRAM(S): 80 TABLET, FILM COATED ORAL at 21:56

## 2021-09-17 RX ADMIN — Medication 100 MILLIGRAM(S): at 21:55

## 2021-09-17 RX ADMIN — Medication 2 MILLIGRAM(S): at 09:25

## 2021-09-17 RX ADMIN — Medication 40 MILLIGRAM(S): at 05:44

## 2021-09-17 RX ADMIN — Medication 100 MILLIGRAM(S): at 13:39

## 2021-09-17 RX ADMIN — GABAPENTIN 300 MILLIGRAM(S): 400 CAPSULE ORAL at 13:39

## 2021-09-17 RX ADMIN — POLYETHYLENE GLYCOL 3350 17 GRAM(S): 17 POWDER, FOR SOLUTION ORAL at 11:43

## 2021-09-17 RX ADMIN — SPIRONOLACTONE 25 MILLIGRAM(S): 25 TABLET, FILM COATED ORAL at 05:44

## 2021-09-17 RX ADMIN — Medication 25 MILLIGRAM(S): at 17:22

## 2021-09-17 RX ADMIN — Medication 5 MILLIGRAM(S): at 21:56

## 2021-09-17 RX ADMIN — Medication 10 UNIT(S): at 08:00

## 2021-09-17 RX ADMIN — AMIODARONE HYDROCHLORIDE 200 MILLIGRAM(S): 400 TABLET ORAL at 05:45

## 2021-09-17 RX ADMIN — GABAPENTIN 300 MILLIGRAM(S): 400 CAPSULE ORAL at 21:56

## 2021-09-17 RX ADMIN — PANTOPRAZOLE SODIUM 40 MILLIGRAM(S): 20 TABLET, DELAYED RELEASE ORAL at 07:08

## 2021-09-17 RX ADMIN — FINASTERIDE 5 MILLIGRAM(S): 5 TABLET, FILM COATED ORAL at 08:11

## 2021-09-17 RX ADMIN — SENNA PLUS 2 TABLET(S): 8.6 TABLET ORAL at 21:56

## 2021-09-17 RX ADMIN — Medication 1: at 16:55

## 2021-09-17 RX ADMIN — Medication 325 MILLIGRAM(S): at 08:11

## 2021-09-17 RX ADMIN — Medication 600 MILLIGRAM(S): at 05:44

## 2021-09-17 RX ADMIN — SODIUM CHLORIDE 3 MILLILITER(S): 9 INJECTION INTRAMUSCULAR; INTRAVENOUS; SUBCUTANEOUS at 05:03

## 2021-09-17 RX ADMIN — Medication 2 MILLIGRAM(S): at 16:56

## 2021-09-17 NOTE — PROGRESS NOTE ADULT - PROBLEM SELECTOR PLAN 1
Patient would benefit from insulin as outpatient, however he is adamantly refusing at this time. Will discontinue basal bolus insulin regimen for now, switch to Glimepiride 2mg BID (with breakfast and dinner) and continue coverage scale ac/hs.  Will continue monitoring FS and FU. If blood sugars remain stable, he may be DC on the following PO hypoglycemic regimen:  -Glimepiride 2mg BID with meals  -Janumet 50/1000 BID  -endo FU 2 weeks  Discussed plan with patient and primary team. Patient counseled for compliance with consistent low carb diet and physical activity as tolerated outpatient.

## 2021-09-17 NOTE — PROGRESS NOTE ADULT - SUBJECTIVE AND OBJECTIVE BOX
VITAL SIGNS    Telemetry:    Vital Signs Last 24 Hrs  T(C): 36.9 (21 @ 04:33), Max: 36.9 (21 @ 21:31)  T(F): 98.4 (21 @ 04:33), Max: 98.4 (21 @ 21:31)  HR: 64 (21 @ 04:33) (64 - 70)  BP: 105/64 (21 @ 04:33) (101/66 - 106/64)  RR: 18 (21 @ 04:33) (18 - 18)  SpO2: 98% (21 @ 04:33) (98% - 100%)             @ 07:01  -   @ 07:00  --------------------------------------------------------  IN: 1430 mL / OUT: 3550 mL / NET: -2120 mL     @ 07:01  -   @ 10:49  --------------------------------------------------------  IN: 240 mL / OUT: 500 mL / NET: -260 mL       Daily     Daily Weight in k.4 (17 Sep 2021 07:52)  Admit Wt: Drug Dosing Weight  Height (cm): 167.6 (11 Sep 2021 08:11)  Weight (kg): 77.1 (10 Sep 2021 22:12)  BMI (kg/m2): 27.4 (11 Sep 2021 08:11)  BSA (m2): 1.87 (11 Sep 2021 08:11)      CAPILLARY BLOOD GLUCOSE      POCT Blood Glucose.: 153 mg/dL (17 Sep 2021 07:29)  POCT Blood Glucose.: 108 mg/dL (16 Sep 2021 22:20)  POCT Blood Glucose.: 74 mg/dL (16 Sep 2021 21:47)  POCT Blood Glucose.: 101 mg/dL (16 Sep 2021 16:40)  POCT Blood Glucose.: 115 mg/dL (16 Sep 2021 11:31)          acetaminophen   Tablet .. 650 milliGRAM(s) Oral every 6 hours PRN  aMIOdarone    Tablet 200 milliGRAM(s) Oral daily  aspirin enteric coated 325 milliGRAM(s) Oral daily  atorvastatin 80 milliGRAM(s) Oral at bedtime  bisacodyl 5 milliGRAM(s) Oral at bedtime  cefuroxime  IVPB      cefuroxime  IVPB 1500 milliGRAM(s) IV Intermittent every 8 hours  dextrose 40% Gel 15 Gram(s) Oral once  dextrose 5%. 1000 milliLiter(s) IV Continuous <Continuous>  dextrose 5%. 1000 milliLiter(s) IV Continuous <Continuous>  dextrose 50% Injectable 25 Gram(s) IV Push once  dextrose 50% Injectable 12.5 Gram(s) IV Push once  dextrose 50% Injectable 25 Gram(s) IV Push once  finasteride 5 milliGRAM(s) Oral daily  furosemide    Tablet 40 milliGRAM(s) Oral daily  gabapentin 300 milliGRAM(s) Oral three times a day  gemfibrozil 600 milliGRAM(s) Oral two times a day  glimepiride 2 milliGRAM(s) Oral <User Schedule>  glucagon  Injectable 1 milliGRAM(s) IntraMuscular once  heparin   Injectable 5000 Unit(s) SubCutaneous every 8 hours  influenza   Vaccine 0.5 milliLiter(s) IntraMuscular once  insulin lispro (ADMELOG) corrective regimen sliding scale   SubCutaneous three times a day before meals  insulin lispro (ADMELOG) corrective regimen sliding scale   SubCutaneous at bedtime  JANUMET  1 Tablet(s) 1 Tablet(s) Oral two times a day  metoprolol tartrate 25 milliGRAM(s) Oral every 12 hours  pantoprazole    Tablet 40 milliGRAM(s) Oral before breakfast  polyethylene glycol 3350 17 Gram(s) Oral daily  senna 2 Tablet(s) Oral at bedtime  sodium chloride 0.9% lock flush 3 milliLiter(s) IV Push every 8 hours  spironolactone 25 milliGRAM(s) Oral daily  tamsulosin 0.4 milliGRAM(s) Oral at bedtime  vancomycin  IVPB      vancomycin  IVPB 1000 milliGRAM(s) IV Intermittent daily      PHYSICAL EXAM    Subjective: "Hi.   Neurology: alert and oriented x 3, nonfocal, no gross deficits  CV : tele:  RSR  Sternal Wound :  CDI with dressing , Stable  Lungs: clear. RR easy, unlabored   Abdomen: soft, nontender, nondistended, positive bowel sounds, bowel movement   Neg N/V/D   :  pt voiding without difficulty   Extremities:   ISSA; edema, neg calf tenderness.   PPP bilaterally      PW:  Chest tubes:                 VITAL SIGNS    Telemetry:  rsr 60-70   Vital Signs Last 24 Hrs  T(C): 36.9 (21 @ 04:33), Max: 36.9 (21 @ 21:31)  T(F): 98.4 (21 @ 04:33), Max: 98.4 (21 @ 21:31)  HR: 64 (21 @ 04:33) (64 - 70)  BP: 105/64 (21 @ 04:33) (101/66 - 106/64)  RR: 18 (21 @ 04:33) (18 - 18)  SpO2: 98% (21 @ 04:33) (98% - 100%)             @ 07:01  -   @ 07:00  --------------------------------------------------------  IN: 1430 mL / OUT: 3550 mL / NET: -2120 mL     @ 07:01  -   @ 10:49  --------------------------------------------------------  IN: 240 mL / OUT: 500 mL / NET: -260 mL       Daily     Daily Weight in k.4 (17 Sep 2021 07:52)  Admit Wt: Drug Dosing Weight  Height (cm): 167.6 (11 Sep 2021 08:11)  Weight (kg): 77.1 (10 Sep 2021 22:12)  BMI (kg/m2): 27.4 (11 Sep 2021 08:11)  BSA (m2): 1.87 (11 Sep 2021 08:11)      CAPILLARY BLOOD GLUCOSE      POCT Blood Glucose.: 153 mg/dL (17 Sep 2021 07:29)  POCT Blood Glucose.: 108 mg/dL (16 Sep 2021 22:20)  POCT Blood Glucose.: 74 mg/dL (16 Sep 2021 21:47)  POCT Blood Glucose.: 101 mg/dL (16 Sep 2021 16:40)  POCT Blood Glucose.: 115 mg/dL (16 Sep 2021 11:31)          acetaminophen   Tablet .. 650 milliGRAM(s) Oral every 6 hours PRN  aMIOdarone    Tablet 200 milliGRAM(s) Oral daily  aspirin enteric coated 325 milliGRAM(s) Oral daily  atorvastatin 80 milliGRAM(s) Oral at bedtime  bisacodyl 5 milliGRAM(s) Oral at bedtime  cefuroxime  IVPB      cefuroxime  IVPB 1500 milliGRAM(s) IV Intermittent every 8 hours  dextrose 40% Gel 15 Gram(s) Oral once  dextrose 5%. 1000 milliLiter(s) IV Continuous <Continuous>  dextrose 5%. 1000 milliLiter(s) IV Continuous <Continuous>  dextrose 50% Injectable 25 Gram(s) IV Push once  dextrose 50% Injectable 12.5 Gram(s) IV Push once  dextrose 50% Injectable 25 Gram(s) IV Push once  finasteride 5 milliGRAM(s) Oral daily  furosemide    Tablet 40 milliGRAM(s) Oral daily  gabapentin 300 milliGRAM(s) Oral three times a day  gemfibrozil 600 milliGRAM(s) Oral two times a day  glimepiride 2 milliGRAM(s) Oral <User Schedule>  glucagon  Injectable 1 milliGRAM(s) IntraMuscular once  heparin   Injectable 5000 Unit(s) SubCutaneous every 8 hours  influenza   Vaccine 0.5 milliLiter(s) IntraMuscular once  insulin lispro (ADMELOG) corrective regimen sliding scale   SubCutaneous three times a day before meals  insulin lispro (ADMELOG) corrective regimen sliding scale   SubCutaneous at bedtime  JANUMET  1 Tablet(s) 1 Tablet(s) Oral two times a day  metoprolol tartrate 25 milliGRAM(s) Oral every 12 hours  pantoprazole    Tablet 40 milliGRAM(s) Oral before breakfast  polyethylene glycol 3350 17 Gram(s) Oral daily  senna 2 Tablet(s) Oral at bedtime  sodium chloride 0.9% lock flush 3 milliLiter(s) IV Push every 8 hours  spironolactone 25 milliGRAM(s) Oral daily  tamsulosin 0.4 milliGRAM(s) Oral at bedtime  vancomycin  IVPB      vancomycin  IVPB 1000 milliGRAM(s) IV Intermittent daily      PHYSICAL EXAM    Subjective: "How is my leg? I'm not taking insulin at home."   Neurology: alert and oriented x 3, nonfocal, no gross deficits  CV : tele:  RSR 60-70  Sternal Wound :  CDI DENI- healing well;  sternum stable   Lungs: clear. RR easy, unlabored   Abdomen: soft, nontender, nondistended, positive bowel sounds, + bowel movement;  Neg N/V/D   :  pt voiding without difficulty   Extremities:   ISSA; +2 LE edema, neg calf tenderness.   PPP bilaterally; left svg site cdi deni - mild errythema noted     PW: no   Chest tubes: none

## 2021-09-17 NOTE — PROGRESS NOTE ADULT - PROBLEM SELECTOR PLAN 1
Endocrine following, Dr. Hill  Continue Diabetic Diet  Continue Lantus 22 HS and Admelog 10 TID  Check FS AC/HS  Continue Insulin teaching Endocrine following, Dr. Hill  Continue Diabetic Diet  Check FS AC/HS Endocrine following, Dr. Hill  Continue Diabetic Diet  Check FS AC/HS  pt changed to po janumet and glimeperide 2 mg po bid as per endo

## 2021-09-17 NOTE — PROGRESS NOTE ADULT - PROBLEM SELECTOR PLAN 2
Continue  mg QD, Lopressor 25 mg q12h, and Atorvastatin 80 mg qhs   Continue Amio 200 mg QD   Continue gentle diuresis w/ Lasix 40 mg PO QD & Aldactone 25 mg QD   Increase activity as tolerated, ambulate 4x daily   Encourage incentive spirometry   Wound care and assessment Continue  mg QD, Lopressor 25 mg q12h, and Atorvastatin 80 mg qhs   Continue Amio 200 mg QD   Continue gentle diuresis w/ Lasix 40 mg PO QD & Aldactone 25 mg QD   Increase activity as tolerated, ambulate 4x daily   Encourage incentive spirometry   Wound care and assessment  pt changed to po janumet and glimeperide 2 mg po bid as per endo

## 2021-09-17 NOTE — DISCHARGE NOTE NURSING/CASE MANAGEMENT/SOCIAL WORK - PATIENT PORTAL LINK FT
You can access the FollowMyHealth Patient Portal offered by [facility] by registering at the following website: [email or web address]. By joining [employer]’s FollowMyHealth portal, you will also be able to view your health information using other applications (apps) compatible with our system.

## 2021-09-17 NOTE — PROGRESS NOTE ADULT - SUBJECTIVE AND OBJECTIVE BOX
Chief complaint  Patient is a 66y old  Male who presents with a chief complaint of LLE edema/erythema (17 Sep 2021 10:49)   Review of systems  Patient in bed, looks comfortable, no hypoglycemic episodes.    Labs and Fingersticks  CAPILLARY BLOOD GLUCOSE      POCT Blood Glucose.: 137 mg/dL (17 Sep 2021 11:38)  POCT Blood Glucose.: 153 mg/dL (17 Sep 2021 07:29)  POCT Blood Glucose.: 108 mg/dL (16 Sep 2021 22:20)  POCT Blood Glucose.: 74 mg/dL (16 Sep 2021 21:47)  POCT Blood Glucose.: 101 mg/dL (16 Sep 2021 16:40)      Anion Gap, Serum: 13 (09-17 @ 05:47)  Anion Gap, Serum: 12 (09-16 @ 06:05)      Calcium, Total Serum: 8.9 (09-17 @ 05:47)  Calcium, Total Serum: 9.6 (09-16 @ 06:05)          09-17    138  |  104  |  20  ----------------------------<  138<H>  3.8   |  21<L>  |  0.98    Ca    8.9      17 Sep 2021 05:47                          9.8    5.09  )-----------( 531      ( 17 Sep 2021 05:47 )             31.0     Medications  MEDICATIONS  (STANDING):  aMIOdarone    Tablet 200 milliGRAM(s) Oral daily  aspirin enteric coated 325 milliGRAM(s) Oral daily  atorvastatin 80 milliGRAM(s) Oral at bedtime  bisacodyl 5 milliGRAM(s) Oral at bedtime  cefuroxime  IVPB      cefuroxime  IVPB 1500 milliGRAM(s) IV Intermittent every 8 hours  dextrose 40% Gel 15 Gram(s) Oral once  dextrose 5%. 1000 milliLiter(s) (50 mL/Hr) IV Continuous <Continuous>  dextrose 5%. 1000 milliLiter(s) (100 mL/Hr) IV Continuous <Continuous>  dextrose 50% Injectable 25 Gram(s) IV Push once  dextrose 50% Injectable 12.5 Gram(s) IV Push once  dextrose 50% Injectable 25 Gram(s) IV Push once  finasteride 5 milliGRAM(s) Oral daily  furosemide    Tablet 40 milliGRAM(s) Oral daily  gabapentin 300 milliGRAM(s) Oral three times a day  gemfibrozil 600 milliGRAM(s) Oral two times a day  glimepiride 2 milliGRAM(s) Oral <User Schedule>  glucagon  Injectable 1 milliGRAM(s) IntraMuscular once  heparin   Injectable 5000 Unit(s) SubCutaneous every 8 hours  influenza   Vaccine 0.5 milliLiter(s) IntraMuscular once  insulin lispro (ADMELOG) corrective regimen sliding scale   SubCutaneous three times a day before meals  insulin lispro (ADMELOG) corrective regimen sliding scale   SubCutaneous at bedtime  JANUMET 50/1000 1 Tablet(s) 1 Tablet(s) Oral two times a day  metoprolol tartrate 25 milliGRAM(s) Oral every 12 hours  pantoprazole    Tablet 40 milliGRAM(s) Oral before breakfast  polyethylene glycol 3350 17 Gram(s) Oral daily  senna 2 Tablet(s) Oral at bedtime  sodium chloride 0.9% lock flush 3 milliLiter(s) IV Push every 8 hours  spironolactone 25 milliGRAM(s) Oral daily  tamsulosin 0.4 milliGRAM(s) Oral at bedtime  vancomycin  IVPB      vancomycin  IVPB 1000 milliGRAM(s) IV Intermittent daily      Physical Exam  General: Patient comfortable in bed  Vital Signs Last 12 Hrs  T(F): 98.4 (09-17-21 @ 04:33), Max: 98.4 (09-17-21 @ 04:33)  HR: 64 (09-17-21 @ 04:33) (64 - 64)  BP: 105/64 (09-17-21 @ 04:33) (105/64 - 105/64)  BP(mean): --  RR: 18 (09-17-21 @ 04:33) (18 - 18)  SpO2: 98% (09-17-21 @ 04:33) (98% - 98%)  Neck: No palpable thyroid nodules.  CVS: S1S2, No murmurs  Respiratory: No wheezing, no crepitations  GI: Abdomen soft, bowel sounds positive  Musculoskeletal:  edema lower extremities.   Skin: No skin rashes, no ecchymosis    Diagnostics             Chief complaint  Patient is a 66y old  Male who presents with a chief complaint of LLE edema/erythema (17 Sep 2021 10:49)   Review of systems  Patient in bed, looks comfortable, no hypoglycemic episodes.    Labs and Fingersticks  CAPILLARY BLOOD GLUCOSE      POCT Blood Glucose.: 137 mg/dL (17 Sep 2021 11:38)  POCT Blood Glucose.: 153 mg/dL (17 Sep 2021 07:29)  POCT Blood Glucose.: 108 mg/dL (16 Sep 2021 22:20)  POCT Blood Glucose.: 74 mg/dL (16 Sep 2021 21:47)  POCT Blood Glucose.: 101 mg/dL (16 Sep 2021 16:40)      Anion Gap, Serum: 13 (09-17 @ 05:47)  Anion Gap, Serum: 12 (09-16 @ 06:05)      Calcium, Total Serum: 8.9 (09-17 @ 05:47)  Calcium, Total Serum: 9.6 (09-16 @ 06:05)          09-17    138  |  104  |  20  ----------------------------<  138<H>  3.8   |  21<L>  |  0.98    Ca    8.9      17 Sep 2021 05:47                          9.8    5.09  )-----------( 531      ( 17 Sep 2021 05:47 )             31.0     Medications  MEDICATIONS  (STANDING):  aMIOdarone    Tablet 200 milliGRAM(s) Oral daily  aspirin enteric coated 325 milliGRAM(s) Oral daily  atorvastatin 80 milliGRAM(s) Oral at bedtime  bisacodyl 5 milliGRAM(s) Oral at bedtime  cefuroxime  IVPB      cefuroxime  IVPB 1500 milliGRAM(s) IV Intermittent every 8 hours  dextrose 40% Gel 15 Gram(s) Oral once  dextrose 5%. 1000 milliLiter(s) (50 mL/Hr) IV Continuous <Continuous>  dextrose 5%. 1000 milliLiter(s) (100 mL/Hr) IV Continuous <Continuous>  dextrose 50% Injectable 25 Gram(s) IV Push once  dextrose 50% Injectable 12.5 Gram(s) IV Push once  dextrose 50% Injectable 25 Gram(s) IV Push once  finasteride 5 milliGRAM(s) Oral daily  furosemide    Tablet 40 milliGRAM(s) Oral daily  gabapentin 300 milliGRAM(s) Oral three times a day  gemfibrozil 600 milliGRAM(s) Oral two times a day  glimepiride 2 milliGRAM(s) Oral <User Schedule>  glucagon  Injectable 1 milliGRAM(s) IntraMuscular once  heparin   Injectable 5000 Unit(s) SubCutaneous every 8 hours  influenza   Vaccine 0.5 milliLiter(s) IntraMuscular once  insulin lispro (ADMELOG) corrective regimen sliding scale   SubCutaneous three times a day before meals  insulin lispro (ADMELOG) corrective regimen sliding scale   SubCutaneous at bedtime  JANUMET 50/1000 1 Tablet(s) 1 Tablet(s) Oral two times a day  metoprolol tartrate 25 milliGRAM(s) Oral every 12 hours  pantoprazole    Tablet 40 milliGRAM(s) Oral before breakfast  polyethylene glycol 3350 17 Gram(s) Oral daily  senna 2 Tablet(s) Oral at bedtime  sodium chloride 0.9% lock flush 3 milliLiter(s) IV Push every 8 hours  spironolactone 25 milliGRAM(s) Oral daily  tamsulosin 0.4 milliGRAM(s) Oral at bedtime  vancomycin  IVPB      vancomycin  IVPB 1000 milliGRAM(s) IV Intermittent daily      Physical Exam  General: Patient comfortable in bed  Vital Signs Last 12 Hrs  T(F): 98.4 (09-17-21 @ 04:33), Max: 98.4 (09-17-21 @ 04:33)  HR: 64 (09-17-21 @ 04:33) (64 - 64)  BP: 105/64 (09-17-21 @ 04:33) (105/64 - 105/64)  BP(mean): --  RR: 18 (09-17-21 @ 04:33) (18 - 18)  SpO2: 98% (09-17-21 @ 04:33) (98% - 98%)  Neck: No palpable thyroid nodules.  CVS: S1S2, No murmurs  Respiratory: No wheezing, no crepitations  GI: Abdomen soft, bowel sounds positive  Musculoskeletal:  edema lower extremities.   Skin: No skin rashes, no ecchymosis    Diagnostics

## 2021-09-17 NOTE — PROGRESS NOTE ADULT - ASSESSMENT
64 y/o M w/ PMHx of T2DM c/b peripheral neuropathy, HLD, HTN, BPH, and CAD s/p C3L w/ Dr. Valerio 8/27/21 (d/c'd home 9/6) presents to [facility] ED c/o worsening LLE redness, swelling, and pain x 3-4 days. He states that his leg started feeling "hard" and warm today, which prompted him to call the CTS answering service. After d/w Dr. Valerio, decision was made for the pt to come to the ED for further evaluation and likely admission for further workup and management. Additionally, pt reports having an open wound on his L shin that has started to have bloody drainage since he was d/c'd home.     Pt seen and evaluated on arrival to ED - VSS, NAD. Afebrile, no leukocytosis, nontoxic appearing. XR tib/fib showed no cortical erosions or gas tracking. LLE duplex negative for DVT, (+) L greater saphenous vein thrombosis - likely residual as pt had L SVG harvest for CABG, no indication for AC as per Dr. Valerio. BCx x 2 sent in ED - will f/u. D/w Dr. Valerio - will admit pt for further workup and management. Per Dr. Valerio, pt started on Zinacef for ppx - 1st dose given in ED. Will otherwise resume home medication regimen.   9/11 HD stable SR 60's.  Vancomycin added.  Continue zinacef.  Ace wrap LLE.  Neg for DVT  9/12 Premeal/lantus added  Vanco/Zinacef LLE cellulitis>Vanco level after 4th dose  9/13 LLE improved edema, wd with scant serous drainage, Cont abx  9/14 Hyperglycemia, endo consult, increase coverage, Cont iv abx for now. Ace wrap  9/15 VSS,  Insulin requirements increased for hyperglycemia. Continue IV Vanco through 9/17 for 7 day course total. Plan to d/c patient on PO Zyvox x 2 weeks on d/c per Dr. Valerio.  9/16 VSS, FS better controlled 109 - 167, continue on IV Vanco daily and plan to d/c home on po zyvox after 7 day course.  64 y/o M w/ PMHx of T2DM c/b peripheral neuropathy, HLD, HTN, BPH, and CAD s/p C3L w/ Dr. Valerio 8/27/21 (d/c'd home 9/6) presents to [facility] ED c/o worsening LLE redness, swelling, and pain x 3-4 days. He states that his leg started feeling "hard" and warm today, which prompted him to call the CTS answering service. After d/w Dr. Valerio, decision was made for the pt to come to the ED for further evaluation and likely admission for further workup and management. Additionally, pt reports having an open wound on his L shin that has started to have bloody drainage since he was d/c'd home.     Pt seen and evaluated on arrival to ED - VSS, NAD. Afebrile, no leukocytosis, nontoxic appearing. XR tib/fib showed no cortical erosions or gas tracking. LLE duplex negative for DVT, (+) L greater saphenous vein thrombosis - likely residual as pt had L SVG harvest for CABG, no indication for AC as per Dr. Valerio. BCx x 2 sent in ED - will f/u. D/w Dr. Valerio - will admit pt for further workup and management. Per Dr. Valerio, pt started on Zinacef for ppx - 1st dose given in ED. Will otherwise resume home medication regimen.   9/11 HD stable SR 60's.  Vancomycin added.  Continue zinacef.  Ace wrap LLE.  Neg for DVT  9/12 Premeal/lantus added  Vanco/Zinacef LLE cellulitis>Vanco level after 4th dose  9/13 LLE improved edema, wd with scant serous drainage, Cont abx  9/14 Hyperglycemia, endo consult, increase coverage, Cont iv abx for now. Ace wrap  9/15 VSS,  Insulin requirements increased for hyperglycemia. Continue IV Vanco through 9/17 for 7 day course total. Plan to d/c patient on PO Zyvox x 2 weeks on d/c per Dr. Valerio.  9/16 VSS, FS better controlled 109 - 167, continue on IV Vanco daily and plan to d/c home on po zyvox after 7 day course.   9/17 VSS; wbc 5 and pt afebrile; continue abx as per ID; continue diuresis; pt refusing insulin for at home; pt changed to po janumet and glimeperide 2 mg po bid as per endo  discharge planning- home in am sat if bs stable overnight  66 y/o M w/ PMHx of T2DM c/b peripheral neuropathy, HLD, HTN, BPH, and CAD s/p C3L w/ Dr. Valerio 8/27/21 (d/c'd home 9/6) presents to [facility] ED c/o worsening LLE redness, swelling, and pain x 3-4 days. He states that his leg started feeling "hard" and warm today, which prompted him to call the CTS answering service. After d/w Dr. Valerio, decision was made for the pt to come to the ED for further evaluation and likely admission for further workup and management. Additionally, pt reports having an open wound on his L shin that has started to have bloody drainage since he was d/c'd home.     Pt seen and evaluated on arrival to ED - VSS, NAD. Afebrile, no leukocytosis, nontoxic appearing. XR tib/fib showed no cortical erosions or gas tracking. LLE duplex negative for DVT, (+) L greater saphenous vein thrombosis - likely residual as pt had L SVG harvest for CABG, no indication for AC as per Dr. Valerio. BCx x 2 sent in ED - will f/u. D/w Dr. Valerio - will admit pt for further workup and management. Per Dr. Valeiro, pt started on Zinacef for ppx - 1st dose given in ED. Will otherwise resume home medication regimen.   9/11 HD stable SR 60's.  Vancomycin added.  Continue zinacef.  Ace wrap LLE.  Neg for DVT  9/12 Premeal/lantus added  Vanco/Zinacef LLE cellulitis>Vanco level after 4th dose  9/13 LLE improved edema, wd with scant serous drainage, Cont abx  9/14 Hyperglycemia, endo consult, increase coverage, Cont iv abx for now. Ace wrap  9/15 VSS,  Insulin requirements increased for hyperglycemia. Continue IV Vanco through 9/17 for 7 day course total. Plan to d/c patient on PO Zyvox x 2 weeks on d/c per Dr. Valerio.  9/16 VSS, FS better controlled 109 - 167, continue on IV Vanco daily and plan to d/c home on po zyvox after 7 day course.   9/17 VSS; wbc 5 and pt afebrile; continue abx as per ID; continue diuresis; pt refusing insulin for at home; pt changed to po janumet and glimeperide 2 mg po bid as per endo  9/18 VSS; RSR 60-80; bs controlled this am; bun/cr increased this am 33/1.5 - repeat bmp 35/1.7; diuretics d/c as well as janumet; PVR bladder scan ordered; d/w RN;   renal consult called with Dr. Fitzpatrick; wbc 6 and pt afebrile; d/c iv abx and change to po zyvox   pt belligerent at times - refusing to follow medical advice and orders; pt refusing insulin  - endo following  64 y/o M w/ PMHx of T2DM c/b peripheral neuropathy, HLD, HTN, BPH, and CAD s/p C3L w/ Dr. Valerio 8/27/21 (d/c'd home 9/6) presents to [facility] ED c/o worsening LLE redness, swelling, and pain x 3-4 days. He states that his leg started feeling "hard" and warm today, which prompted him to call the CTS answering service. After d/w Dr. Valerio, decision was made for the pt to come to the ED for further evaluation and likely admission for further workup and management. Additionally, pt reports having an open wound on his L shin that has started to have bloody drainage since he was d/c'd home.     Pt seen and evaluated on arrival to ED - VSS, NAD. Afebrile, no leukocytosis, nontoxic appearing. XR tib/fib showed no cortical erosions or gas tracking. LLE duplex negative for DVT, (+) L greater saphenous vein thrombosis - likely residual as pt had L SVG harvest for CABG, no indication for AC as per Dr. Valerio. BCx x 2 sent in ED - will f/u. D/w Dr. Valerio - will admit pt for further workup and management. Per Dr. Valerio, pt started on Zinacef for ppx - 1st dose given in ED. Will otherwise resume home medication regimen.   9/11 HD stable SR 60's.  Vancomycin added.  Continue zinacef.  Ace wrap LLE.  Neg for DVT  9/12 Premeal/lantus added  Vanco/Zinacef LLE cellulitis>Vanco level after 4th dose  9/13 LLE improved edema, wd with scant serous drainage, Cont abx  9/14 Hyperglycemia, endo consult, increase coverage, Cont iv abx for now. Ace wrap  9/15 VSS,  Insulin requirements increased for hyperglycemia. Continue IV Vanco through 9/17 for 7 day course total. Plan to d/c patient on PO Zyvox x 2 weeks on d/c per Dr. Valerio.  9/16 VSS, FS better controlled 109 - 167, continue on IV Vanco daily and plan to d/c home on po zyvox after 7 day course.   9/17 VSS; wbc 5 and pt afebrile; continue abx as per ID; continue diuresis; pt refusing insulin for at home; pt changed to po janumet and glimeperide 2 mg po bid as per endo    home in am if bs stable

## 2021-09-17 NOTE — PROGRESS NOTE ADULT - ASSESSMENT
Assessment  DMT2: 66y Male with DM T2 with hyperglycemia, was on oral meds at home, now on basal bolus insulin, increased dose yesterday, blood sugars are improving and trending within overall acceptable range now, no hypoglycemic episodes. Patient is eating meals with good appetite, appears comfortable, DC planning in progress, he is adamantly refusing insulin as outpatient.  CAD: s/p CABG, on medications, stable, monitored.  Wound Infection: On meds, monitored.  HTN: Controlled,  on antihypertensive medications.        Cb Hill MD  Cell: [phone]  Office: [phone]     Assessment  DMT2: 66y Male with DM T2 with hyperglycemia, was on oral meds at home, now on basal bolus insulin, increased dose yesterday, blood sugars are improving and  trending within overall acceptable range now, no hypoglycemic episodes. Patient is eating meals with good appetite, appears comfortable, DC planning in progress, he is adamantly refusing insulin as outpatient.  CAD: s/p CABG, on medications, stable, monitored.  Wound Infection: On meds, monitored.  HTN: Controlled,  on antihypertensive medications.        Cb Hill MD  Cell: [phone]  Office: [phone]

## 2021-09-17 NOTE — PROGRESS NOTE ADULT - PROBLEM SELECTOR PLAN 3
LLE duplex 9/10: negative for DVT, (+) L greater saphenous vein thrombosis - likely residual as pt had L SVG harvest  9/11 BCx2 NGTD  Continue Zinacef 1500 mg IV q8h as ppx  Continue Vancomycin 1g Q 24h as per Dr. Valerio x 7 days till 9/17  Plan to d/c patient on PO Zyvox 600mg BID x 2 weeks per Dr. Valerio after completing IV Vanco. Script sent to [facility]. LLE duplex 9/10: negative for DVT, (+) L greater saphenous vein thrombosis - likely residual as pt had L SVG harvest  9/11 BCx2 NGTD  Continue Zinacef 1500 mg IV q8h as ppx  Continue Vancomycin 1g Q 24h as per Dr. Valerio x 7 days till 9/17  Plan to d/c patient on PO Zyvox 600mg BID x 2 weeks per Dr. Valerio after completing IV Vanco. Script sent to [facility].  pt changed to po janumet and glimeperide 2 mg po bid as per endo

## 2021-09-18 DIAGNOSIS — N17.9 ACUTE KIDNEY FAILURE, UNSPECIFIED: ICD-10-CM

## 2021-09-18 LAB
ANION GAP SERPL CALC-SCNC: 15 MMOL/L — SIGNIFICANT CHANGE UP (ref 5–17)
ANION GAP SERPL CALC-SCNC: 16 MMOL/L — SIGNIFICANT CHANGE UP (ref 5–17)
APPEARANCE UR: CLEAR — SIGNIFICANT CHANGE UP
BACTERIA # UR AUTO: NEGATIVE — SIGNIFICANT CHANGE UP
BILIRUB UR-MCNC: NEGATIVE — SIGNIFICANT CHANGE UP
BUN SERPL-MCNC: 33 MG/DL — HIGH (ref 7–23)
BUN SERPL-MCNC: 35 MG/DL — HIGH (ref 7–23)
CALCIUM SERPL-MCNC: 9.4 MG/DL — SIGNIFICANT CHANGE UP (ref 8.4–10.5)
CALCIUM SERPL-MCNC: 9.9 MG/DL — SIGNIFICANT CHANGE UP (ref 8.4–10.5)
CHLORIDE SERPL-SCNC: 100 MMOL/L — SIGNIFICANT CHANGE UP (ref 96–108)
CHLORIDE SERPL-SCNC: 98 MMOL/L — SIGNIFICANT CHANGE UP (ref 96–108)
CHLORIDE UR-SCNC: 110 MMOL/L — SIGNIFICANT CHANGE UP
CO2 SERPL-SCNC: 18 MMOL/L — LOW (ref 22–31)
CO2 SERPL-SCNC: 20 MMOL/L — LOW (ref 22–31)
COLOR SPEC: SIGNIFICANT CHANGE UP
CREAT ?TM UR-MCNC: 32 MG/DL — SIGNIFICANT CHANGE UP
CREAT SERPL-MCNC: 1.53 MG/DL — HIGH (ref 0.5–1.3)
CREAT SERPL-MCNC: 1.7 MG/DL — HIGH (ref 0.5–1.3)
DIFF PNL FLD: NEGATIVE — SIGNIFICANT CHANGE UP
EPI CELLS # UR: 1 /HPF — SIGNIFICANT CHANGE UP
GLUCOSE SERPL-MCNC: 162 MG/DL — HIGH (ref 70–99)
GLUCOSE SERPL-MCNC: 194 MG/DL — HIGH (ref 70–99)
GLUCOSE UR QL: NEGATIVE — SIGNIFICANT CHANGE UP
HCT VFR BLD CALC: 38.4 % — LOW (ref 39–50)
HGB BLD-MCNC: 11.4 G/DL — LOW (ref 13–17)
HYALINE CASTS # UR AUTO: 5 /LPF — HIGH (ref 0–2)
KETONES UR-MCNC: NEGATIVE — SIGNIFICANT CHANGE UP
LEUKOCYTE ESTERASE UR-ACNC: NEGATIVE — SIGNIFICANT CHANGE UP
MCHC RBC-ENTMCNC: 27.9 PG — SIGNIFICANT CHANGE UP (ref 27–34)
MCHC RBC-ENTMCNC: 29.7 GM/DL — LOW (ref 32–36)
MCV RBC AUTO: 93.9 FL — SIGNIFICANT CHANGE UP (ref 80–100)
NITRITE UR-MCNC: NEGATIVE — SIGNIFICANT CHANGE UP
NRBC # BLD: 0 /100 WBCS — SIGNIFICANT CHANGE UP (ref 0–0)
PH UR: 5 — SIGNIFICANT CHANGE UP (ref 5–8)
PH UR: 5 — SIGNIFICANT CHANGE UP (ref 5–8)
PHOSPHATE 24H UR-MCNC: 13 MG/DL — SIGNIFICANT CHANGE UP
PLATELET # BLD AUTO: 416 K/UL — HIGH (ref 150–400)
POTASSIUM SERPL-MCNC: 4.7 MMOL/L — SIGNIFICANT CHANGE UP (ref 3.5–5.3)
POTASSIUM SERPL-MCNC: 4.7 MMOL/L — SIGNIFICANT CHANGE UP (ref 3.5–5.3)
POTASSIUM SERPL-SCNC: 4.7 MMOL/L — SIGNIFICANT CHANGE UP (ref 3.5–5.3)
POTASSIUM SERPL-SCNC: 4.7 MMOL/L — SIGNIFICANT CHANGE UP (ref 3.5–5.3)
POTASSIUM UR-SCNC: 26 MMOL/L — SIGNIFICANT CHANGE UP
PROT UR-MCNC: NEGATIVE — SIGNIFICANT CHANGE UP
RBC # BLD: 4.09 M/UL — LOW (ref 4.2–5.8)
RBC # FLD: 15 % — HIGH (ref 10.3–14.5)
RBC CASTS # UR COMP ASSIST: 1 /HPF — SIGNIFICANT CHANGE UP (ref 0–4)
SODIUM SERPL-SCNC: 133 MMOL/L — LOW (ref 135–145)
SODIUM SERPL-SCNC: 134 MMOL/L — LOW (ref 135–145)
SODIUM UR-SCNC: 107 MMOL/L — SIGNIFICANT CHANGE UP
SP GR SPEC: 1.01 — LOW (ref 1.01–1.02)
UROBILINOGEN FLD QL: NEGATIVE — SIGNIFICANT CHANGE UP
VANCOMYCIN TROUGH SERPL-MCNC: 6 UG/ML — LOW (ref 10–20)
WBC # BLD: 6.23 K/UL — SIGNIFICANT CHANGE UP (ref 3.8–10.5)
WBC # FLD AUTO: 6.23 K/UL — SIGNIFICANT CHANGE UP (ref 3.8–10.5)
WBC UR QL: 1 /HPF — SIGNIFICANT CHANGE UP (ref 0–5)

## 2021-09-18 PROCEDURE — 93308 TTE F-UP OR LMTD: CPT | Mod: 26

## 2021-09-18 PROCEDURE — 93321 DOPPLER ECHO F-UP/LMTD STD: CPT | Mod: 26

## 2021-09-18 PROCEDURE — 99024 POSTOP FOLLOW-UP VISIT: CPT

## 2021-09-18 RX ORDER — LINEZOLID 600 MG/300ML
600 INJECTION, SOLUTION INTRAVENOUS EVERY 12 HOURS
Refills: 0 | Status: DISCONTINUED | OUTPATIENT
Start: 2021-09-18 | End: 2021-09-20

## 2021-09-18 RX ORDER — SODIUM CHLORIDE 9 MG/ML
1000 INJECTION INTRAMUSCULAR; INTRAVENOUS; SUBCUTANEOUS
Refills: 0 | Status: DISCONTINUED | OUTPATIENT
Start: 2021-09-18 | End: 2021-09-20

## 2021-09-18 RX ADMIN — Medication 650 MILLIGRAM(S): at 06:06

## 2021-09-18 RX ADMIN — FINASTERIDE 5 MILLIGRAM(S): 5 TABLET, FILM COATED ORAL at 11:03

## 2021-09-18 RX ADMIN — LINEZOLID 600 MILLIGRAM(S): 600 INJECTION, SOLUTION INTRAVENOUS at 18:18

## 2021-09-18 RX ADMIN — Medication 1: at 12:01

## 2021-09-18 RX ADMIN — HEPARIN SODIUM 5000 UNIT(S): 5000 INJECTION INTRAVENOUS; SUBCUTANEOUS at 06:04

## 2021-09-18 RX ADMIN — HEPARIN SODIUM 5000 UNIT(S): 5000 INJECTION INTRAVENOUS; SUBCUTANEOUS at 13:25

## 2021-09-18 RX ADMIN — Medication 600 MILLIGRAM(S): at 06:05

## 2021-09-18 RX ADMIN — SODIUM CHLORIDE 3 MILLILITER(S): 9 INJECTION INTRAMUSCULAR; INTRAVENOUS; SUBCUTANEOUS at 14:21

## 2021-09-18 RX ADMIN — Medication 100 MILLIGRAM(S): at 06:04

## 2021-09-18 RX ADMIN — TAMSULOSIN HYDROCHLORIDE 0.4 MILLIGRAM(S): 0.4 CAPSULE ORAL at 21:27

## 2021-09-18 RX ADMIN — SPIRONOLACTONE 25 MILLIGRAM(S): 25 TABLET, FILM COATED ORAL at 06:05

## 2021-09-18 RX ADMIN — Medication 650 MILLIGRAM(S): at 21:26

## 2021-09-18 RX ADMIN — Medication 2 MILLIGRAM(S): at 07:53

## 2021-09-18 RX ADMIN — GABAPENTIN 300 MILLIGRAM(S): 400 CAPSULE ORAL at 13:24

## 2021-09-18 RX ADMIN — GABAPENTIN 300 MILLIGRAM(S): 400 CAPSULE ORAL at 21:26

## 2021-09-18 RX ADMIN — GABAPENTIN 300 MILLIGRAM(S): 400 CAPSULE ORAL at 06:05

## 2021-09-18 RX ADMIN — AMIODARONE HYDROCHLORIDE 200 MILLIGRAM(S): 400 TABLET ORAL at 06:06

## 2021-09-18 RX ADMIN — HEPARIN SODIUM 5000 UNIT(S): 5000 INJECTION INTRAVENOUS; SUBCUTANEOUS at 21:28

## 2021-09-18 RX ADMIN — Medication 600 MILLIGRAM(S): at 17:26

## 2021-09-18 RX ADMIN — Medication 40 MILLIGRAM(S): at 06:06

## 2021-09-18 RX ADMIN — Medication 650 MILLIGRAM(S): at 22:26

## 2021-09-18 RX ADMIN — SODIUM CHLORIDE 3 MILLILITER(S): 9 INJECTION INTRAMUSCULAR; INTRAVENOUS; SUBCUTANEOUS at 05:58

## 2021-09-18 RX ADMIN — Medication 325 MILLIGRAM(S): at 11:03

## 2021-09-18 RX ADMIN — Medication 650 MILLIGRAM(S): at 06:38

## 2021-09-18 RX ADMIN — PANTOPRAZOLE SODIUM 40 MILLIGRAM(S): 20 TABLET, DELAYED RELEASE ORAL at 06:05

## 2021-09-18 RX ADMIN — Medication 25 MILLIGRAM(S): at 06:05

## 2021-09-18 RX ADMIN — Medication 25 MILLIGRAM(S): at 17:27

## 2021-09-18 RX ADMIN — ATORVASTATIN CALCIUM 80 MILLIGRAM(S): 80 TABLET, FILM COATED ORAL at 21:27

## 2021-09-18 RX ADMIN — Medication 2 MILLIGRAM(S): at 17:28

## 2021-09-18 RX ADMIN — SODIUM CHLORIDE 3 MILLILITER(S): 9 INJECTION INTRAMUSCULAR; INTRAVENOUS; SUBCUTANEOUS at 21:31

## 2021-09-18 RX ADMIN — SODIUM CHLORIDE 50 MILLILITER(S): 9 INJECTION INTRAMUSCULAR; INTRAVENOUS; SUBCUTANEOUS at 13:24

## 2021-09-18 NOTE — PROGRESS NOTE ADULT - SUBJECTIVE AND OBJECTIVE BOX
VITAL SIGNS    Telemetry:  rsr 60-80   Vital Signs Last 24 Hrs  T(C): 36.8 (21 @ 04:01), Max: 37.2 (21 @ 17:15)  T(F): 98.2 (21 @ 04:01), Max: 99 (21 @ 17:15)  HR: 67 (21 @ 04:01) (67 - 70)  BP: 99/64 (21 @ 04:01) (92/57 - 99/64)  RR: 18 (21 @ 04:01) (18 - 18)  SpO2: 96% (21 @ 04:01) (96% - 100%)             @ 07:01  -   @ 07:00  --------------------------------------------------------  IN: 1110 mL / OUT: 1675 mL / NET: -565 mL     @ 07:01  -   @ 12:25  --------------------------------------------------------  IN: 360 mL / OUT: 450 mL / NET: -90 mL       Daily     Daily Weight in k.4 (18 Sep 2021 10:08)  Admit Wt: Drug Dosing Weight  Height (cm): 167.6 (11 Sep 2021 08:11)  Weight (kg): 77.1 (10 Sep 2021 22:12)  BMI (kg/m2): 27.4 (11 Sep 2021 08:11)  BSA (m2): 1.87 (11 Sep 2021 08:11)      CAPILLARY BLOOD GLUCOSE      POCT Blood Glucose.: 178 mg/dL (18 Sep 2021 11:45)  POCT Blood Glucose.: 123 mg/dL (18 Sep 2021 07:33)  POCT Blood Glucose.: 131 mg/dL (17 Sep 2021 21:21)  POCT Blood Glucose.: 200 mg/dL (17 Sep 2021 16:41)          acetaminophen   Tablet .. 650 milliGRAM(s) Oral every 6 hours PRN  aMIOdarone    Tablet 200 milliGRAM(s) Oral daily  aspirin enteric coated 325 milliGRAM(s) Oral daily  atorvastatin 80 milliGRAM(s) Oral at bedtime  bisacodyl 5 milliGRAM(s) Oral at bedtime  cefuroxime  IVPB      cefuroxime  IVPB 1500 milliGRAM(s) IV Intermittent every 8 hours  dextrose 40% Gel 15 Gram(s) Oral once  dextrose 5%. 1000 milliLiter(s) IV Continuous <Continuous>  dextrose 5%. 1000 milliLiter(s) IV Continuous <Continuous>  dextrose 50% Injectable 25 Gram(s) IV Push once  dextrose 50% Injectable 12.5 Gram(s) IV Push once  dextrose 50% Injectable 25 Gram(s) IV Push once  finasteride 5 milliGRAM(s) Oral daily  gabapentin 300 milliGRAM(s) Oral three times a day  gemfibrozil 600 milliGRAM(s) Oral two times a day  glimepiride 2 milliGRAM(s) Oral <User Schedule>  glucagon  Injectable 1 milliGRAM(s) IntraMuscular once  heparin   Injectable 5000 Unit(s) SubCutaneous every 8 hours  insulin lispro (ADMELOG) corrective regimen sliding scale   SubCutaneous three times a day before meals  insulin lispro (ADMELOG) corrective regimen sliding scale   SubCutaneous at bedtime  metoprolol tartrate 25 milliGRAM(s) Oral every 12 hours  pantoprazole    Tablet 40 milliGRAM(s) Oral before breakfast  polyethylene glycol 3350 17 Gram(s) Oral daily  senna 2 Tablet(s) Oral at bedtime  sodium chloride 0.9% lock flush 3 milliLiter(s) IV Push every 8 hours  tamsulosin 0.4 milliGRAM(s) Oral at bedtime  vancomycin  IVPB 1000 milliGRAM(s) IV Intermittent daily  vancomycin  IVPB          PHYSICAL EXAM    Subjective: "I have a lot of issues to discuss."   Neurology: alert and oriented x 3, nonfocal, no gross deficits  CV : tele:  RSR 60-80   Sternal Wound :  CDI DENI- sternum stable   Lungs: clear. RR easy, unlabored   Abdomen: soft, nontender, nondistended, positive bowel sounds, + bowel movement   Neg N/V/D   :  pt voiding without difficulty   Extremities:   ISSA; +1 LE edema, neg calf tenderness.   PPP bilaterally ; Left SVG site cdi deni- errythema resolved

## 2021-09-18 NOTE — PROGRESS NOTE ADULT - ASSESSMENT
Assessment  DMT2: 66y Male with DM T2 with hyperglycemia, was on oral meds at home, now on basal bolus insulin, increased dose yesterday, blood sugars are improving and  trending within overall acceptable range now, no hypoglycemic episodes. Patient is eating meals with good appetite, appears comfortable, DC planning in progress, he is adamantly refusing insulin as outpatient.  CAD: s/p CABG, on medications, stable, monitored.  ANA: On antibiotics and diuretics. Unlikely diabetes medication causing ANA, renal team on board.  Wound Infection: On meds, monitored.  HTN: Controlled,  on antihypertensive medications.        Cb Hill MD  Cell: [phone]  Office: [phone]

## 2021-09-18 NOTE — PROGRESS NOTE ADULT - PROBLEM SELECTOR PLAN 3
LLE duplex 9/10: negative for DVT, (+) L greater saphenous vein thrombosis - likely residual as pt had L SVG harvest  9/11 BCx2 NGTD  Continue Zinacef 1500 mg IV q8h as ppx  Continue Vancomycin 1g Q 24h as per Dr. Valerio x 7 days till 9/17  Plan to d/c patient on PO Zyvox 600mg BID x 2 weeks per Dr. Valerio after completing IV Vanco. Script sent to [facility].  pt changed to po janumet and glimeperide 2 mg po bid as per endo LLE duplex 9/10: negative for DVT, (+) L greater saphenous vein thrombosis - likely residual as pt had L SVG harvest  9/11 BCx2 NGTD  d/c IV vanco and zinacef and change to po zyvox for 2 weeks as per DR. Valerio   ck vanco trough today at 1500   endo following for glycemic control

## 2021-09-18 NOTE — PROGRESS NOTE ADULT - PROBLEM SELECTOR PLAN 4
Continue Flomax 0.4 mg qhs and Proscar 5 mg qhs Continue Flomax 0.4 mg qhs and Proscar 5 mg qhs  PVR scan today 60 cc

## 2021-09-18 NOTE — CONSULT NOTE ADULT - SUBJECTIVE AND OBJECTIVE BOX
HPI: Mr. Sorenson is a 66 year-old man with history of multiple medical issues including hypertension, type 2 diabetes mellitus, and coronary artery disease s/p CABG 8/27/21 with L SVG harvest (discharged hoang 9/6/21). He returned to [facility] 9/11/21 with worsening left leg redness, swelling, and pain for 3-4 days. He was diagnosed with cellulitis, and he was placed on IV Vancomycin and Cefuroxime.    Since yesterday, Mr. Godinez's creatinine has risen from 0.98 to 1.70mg/dL; therefore, a renal consultation was requested.    I see that his SBP has been down into the 90s since 515pm last evening, down from nearly all prior BP readings this admission. I see that he has been receiving Lasix 40mg po qd, and Spironolactone 25mg po qd since admission (both agents discontinued today).      PAST MEDICAL & SURGICAL HISTORY:  T2DM (type 2 diabetes mellitus) c/b peripheral neuropathy  HTN (hypertension)  HLD (hyperlipidemia)  BPH (benign prostatic hyperplasia)  H/O shoulder surgery right  S/P arthroscopy of right knee    Allergies  sulfa drugs (Hives)    SOCIAL HISTORY:  Denies ETOh,Smoking,     FAMILY HISTORY:  MI (myocardial infarction) (Father)    REVIEW OF SYSTEMS:  CONSTITUTIONAL: No weakness, fevers or chills  EYES/ENT: No visual changes;  No vertigo or throat pain   NECK: No pain or stiffness  RESPIRATORY: No cough, wheezing, hemoptysis; No shortness of breath  CARDIOVASCULAR: No chest pain or palpitations  GASTROINTESTINAL: No abdominal or epigastric pain. No nausea, vomiting, or hematemesis; No diarrhea or constipation. No melena or hematochezia.  GENITOURINARY: No dysuria, frequency or hematuria  NEUROLOGICAL: No numbness or weakness  SKIN: (+)LLE erythema/swelling/tenderness  All other review of systems is negative unless indicated above.    VITAL:  T(C): , Max: 37.2 (09-17-21 @ 17:15)  T(F): , Max: 99 (09-17-21 @ 17:15)  HR: 67 (09-18-21 @ 04:01)  BP: 99/64 (09-18-21 @ 04:01)  RR: 18 (09-18-21 @ 04:01)  SpO2: 96% (09-18-21 @ 04:01)  urine output 1675cc/24h      PHYSICAL EXAM:  Constitutional: NAD, Alert  HEENT: NCAT, MMM  Neck: Supple, No JVD  Respiratory: CTA-b/l  Cardiovascular: RRR s1s2, no m/r/g  Gastrointestinal: BS+, soft, NT/ND  Extremities: No peripheral edema b/l  Neurological: no focal deficits; strength grossly intact  Back: no CVAT b/l  Skin: No rashes, no nevi    LABS:                        11.4   6.23  )-----------( 416      ( 18 Sep 2021 09:56 )             38.4     Na(134)/K(4.7)/Cl(98)/HCO3(20)/BUN(35)/Cr(1.70)Glu(194)/Ca(9.9)/Mg(--)/PO4(--)    09-18 @ 11:27  Na(133)/K(4.7)/Cl(100)/HCO3(18)/BUN(33)/Cr(1.53)Glu(162)/Ca(9.4)/Mg(--)/PO4(--)    09-18 @ 09:56  Na(138)/K(3.8)/Cl(104)/HCO3(21)/BUN(20)/Cr(0.98)Glu(138)/Ca(8.9)/Mg(--)/PO4(--)    09-17 @ 05:47  Na(137)/K(3.9)/Cl(105)/HCO3(20)/BUN(22)/Cr(0.98)Glu(105)/Ca(9.6)/Mg(--)/PO4(--)    09-16 @ 06:05    (8/20/21) - UA - 1000glu, no prot, no blood    IMAGING:  < from: Xray Chest 1 View- PORTABLE-Urgent (Xray Chest 1 View- PORTABLE-Urgent .) (09.02.21 @ 09:02) >  Patchy atelectasis at the left lung base.    ASSESSMENT:  (1)Renal - nonoliguric ANA - is this hemodynamically mediated in association with diuresis? Or is this antibiotic-associated ANA? Both the Vanco and the cephalosporin could be the underlying culprit here.    RECOMMEND:  (1)D/C Vancomycin for now    (2)Check Vanco level    (3)Urinalysis today; if 10 or more WBC per HPF on UA, then would d/c the cephalosporin as well/avoid beta-lactams from this point forward    (4)Urine lytes + creatinine    (5)Renal US    (6)No IVF/No diuretics for now    (7)Meds for GFR 15-20ml/min:      (a)would reduce Glimepremide to 2mg po qd for now      (b)would reduce Gabapentin to 300mg qd for now    (8)BMP+Mg+PO4 daily  Thank you for involving [facility] in this patient's care.    With warm regards,    Rafat Lay MD   [facility]  Office: [phone]  Cell: [phone]               HPI: Mr. Sorenson is a 66 year-old man with history of multiple medical issues including hypertension, type 2 diabetes mellitus, and coronary artery disease s/p CABG 8/27/21 with L SVG harvest (discharged hoang 9/6/21). He returned to [facility] 9/11/21 with worsening left leg redness, swelling, and pain for 3-4 days. He was diagnosed with cellulitis, and he was placed on IV Vancomycin and Cefuroxime.    Since yesterday, Mr. Godinez's creatinine has risen from 0.98 to 1.70mg/dL; therefore, a renal consultation was requested.    I see that his SBP has been down into the 90s since 515pm last evening, down from nearly all prior BP readings this admission. I see that he has been receiving Lasix 40mg po qd, and Spironolactone 25mg po qd since admission (both agents discontinued today).    Mr. Sorenson denies known history of CKD or ANA. He denies recent NSAID use. he shares that his urine is a bit more yellow/more concentrated than usual. He denies dizziness, fatigue, or shortness of breath. He shares that his leg his significantly improved relative to admission.    PAST MEDICAL & SURGICAL HISTORY:  T2DM (type 2 diabetes mellitus) c/b peripheral neuropathy  HTN (hypertension)  HLD (hyperlipidemia)  BPH (benign prostatic hyperplasia)  H/O shoulder surgery right  S/P arthroscopy of right knee    Allergies  sulfa drugs (Hives)    SOCIAL HISTORY:  Denies ETOh,Smoking,     FAMILY HISTORY:  MI (myocardial infarction) (Father)    REVIEW OF SYSTEMS:  CONSTITUTIONAL: No weakness, fevers or chills  EYES/ENT: No visual changes;  No vertigo or throat pain   NECK: No pain or stiffness  RESPIRATORY: No cough, wheezing, hemoptysis; No shortness of breath  CARDIOVASCULAR: No chest pain or palpitations  GASTROINTESTINAL: No abdominal or epigastric pain. No nausea, vomiting, or hematemesis; No diarrhea or constipation. No melena or hematochezia.  GENITOURINARY: No dysuria, frequency or hematuria  NEUROLOGICAL: No numbness or weakness  SKIN: (+)LLE erythema/swelling/tenderness  All other review of systems is negative unless indicated above.    VITAL:  T(C): , Max: 37.2 (09-17-21 @ 17:15)  T(F): , Max: 99 (09-17-21 @ 17:15)  HR: 67 (09-18-21 @ 04:01)  BP: 99/64 (09-18-21 @ 04:01)  RR: 18 (09-18-21 @ 04:01)  SpO2: 96% (09-18-21 @ 04:01)  urine output 1675cc/24h      PHYSICAL EXAM:  Constitutional: NAD, Alert  HEENT: NCAT, MMM  Neck: Supple, No JVD  Respiratory: CTA-b/l  Cardiovascular: RRR s1s2, no m/r/g  Gastrointestinal: BS+, soft, NT/ND  Extremities: No peripheral edema b/l  Neurological: no focal deficits; strength grossly intact  Back: no CVAT b/l  Skin: No rashes, no nevi    LABS:                        11.4   6.23  )-----------( 416      ( 18 Sep 2021 09:56 )             38.4     Na(134)/K(4.7)/Cl(98)/HCO3(20)/BUN(35)/Cr(1.70)Glu(194)/Ca(9.9)/Mg(--)/PO4(--)    09-18 @ 11:27  Na(133)/K(4.7)/Cl(100)/HCO3(18)/BUN(33)/Cr(1.53)Glu(162)/Ca(9.4)/Mg(--)/PO4(--)    09-18 @ 09:56  Na(138)/K(3.8)/Cl(104)/HCO3(21)/BUN(20)/Cr(0.98)Glu(138)/Ca(8.9)/Mg(--)/PO4(--)    09-17 @ 05:47  Na(137)/K(3.9)/Cl(105)/HCO3(20)/BUN(22)/Cr(0.98)Glu(105)/Ca(9.6)/Mg(--)/PO4(--)    09-16 @ 06:05    (8/20/21) - UA - 1000glu, no prot, no blood    IMAGING:  < from: Xray Chest 1 View- PORTABLE-Urgent (Xray Chest 1 View- PORTABLE-Urgent .) (09.02.21 @ 09:02) >  Patchy atelectasis at the left lung base.    ASSESSMENT:  (1)Renal - nonoliguric ANA - is this hemodynamically mediated in association with diuresis? Or is this antibiotic-associated ANA? Both the Vanco and the cephalosporin could be the underlying culprit here.    RECOMMEND:  (1)D/C Vancomycin for now    (2)Check Vanco level    (3)Urinalysis today; if 10 or more WBC per HPF on UA, then would d/c the cephalosporin as well/avoid beta-lactams from this point forward. No objection to use of Zyvox here.    (4)Urine lytes + creatinine    (5)Renal US    (6)NS 50cc/h x 1L    (7)Meds for GFR 15-20ml/min:      (a)would reduce Glimepremide to 2mg po qd for now      (b)would reduce Gabapentin to 300mg qd for now    (8)BMP+Mg+PO4 daily  Thank you for involving [facility] in this patient's care.    With warm regards,    Rafat Lay MD   [facility]  Office: [phone]  Cell: [phone]

## 2021-09-18 NOTE — PROGRESS NOTE ADULT - PROBLEM SELECTOR PLAN 1
Patient would benefit from insulin as outpatient, however he is adamantly refusing at this time. Patient was switched oral DM meds, sugars within acceptable range.  Will continue monitoring FS and FU. If blood sugars remain stable, he may be DC on the following PO hypoglycemic regimen:  -Glimepiride 2mg BID with meals  -if creatinine remains elevated may DC home on Januvia 50 mg PO daily, Glimeperide 2mg once or twice daily depending on blood sugars. Patient counseled  to adjust medications depending of FS numbers.  -endo FU 2 weeks  Discussed plan with patient and primary team. Patient counseled for compliance with consistent low carb diet and physical activity as tolerated outpatient.

## 2021-09-18 NOTE — PROGRESS NOTE ADULT - SUBJECTIVE AND OBJECTIVE BOX
Chief complaint    Patient is a 66y old  Male who presents with a chief complaint of LLE edema/erythema (18 Sep 2021 12:26)   Review of systems  Patient in bed, appears comfortable.    Labs and Fingersticks  CAPILLARY BLOOD GLUCOSE      POCT Blood Glucose.: 178 mg/dL (18 Sep 2021 11:45)  POCT Blood Glucose.: 123 mg/dL (18 Sep 2021 07:33)  POCT Blood Glucose.: 131 mg/dL (17 Sep 2021 21:21)  POCT Blood Glucose.: 200 mg/dL (17 Sep 2021 16:41)      Anion Gap, Serum: 16 (09-18 @ 11:27)  Anion Gap, Serum: 15 (09-18 @ 09:56)  Anion Gap, Serum: 13 (09-17 @ 05:47)      Calcium, Total Serum: 9.9 (09-18 @ 11:27)  Calcium, Total Serum: 9.4 (09-18 @ 09:56)  Calcium, Total Serum: 8.9 (09-17 @ 05:47)          09-18    134<L>  |  98  |  35<H>  ----------------------------<  194<H>  4.7   |  20<L>  |  1.70<H>    Ca    9.9      18 Sep 2021 11:27                          11.4   6.23  )-----------( 416      ( 18 Sep 2021 09:56 )             38.4     Medications  MEDICATIONS  (STANDING):  aMIOdarone    Tablet 200 milliGRAM(s) Oral daily  aspirin enteric coated 325 milliGRAM(s) Oral daily  atorvastatin 80 milliGRAM(s) Oral at bedtime  bisacodyl 5 milliGRAM(s) Oral at bedtime  dextrose 40% Gel 15 Gram(s) Oral once  dextrose 5%. 1000 milliLiter(s) (50 mL/Hr) IV Continuous <Continuous>  dextrose 5%. 1000 milliLiter(s) (100 mL/Hr) IV Continuous <Continuous>  dextrose 50% Injectable 25 Gram(s) IV Push once  dextrose 50% Injectable 12.5 Gram(s) IV Push once  dextrose 50% Injectable 25 Gram(s) IV Push once  finasteride 5 milliGRAM(s) Oral daily  gabapentin 300 milliGRAM(s) Oral three times a day  gemfibrozil 600 milliGRAM(s) Oral two times a day  glimepiride 2 milliGRAM(s) Oral <User Schedule>  glucagon  Injectable 1 milliGRAM(s) IntraMuscular once  heparin   Injectable 5000 Unit(s) SubCutaneous every 8 hours  insulin lispro (ADMELOG) corrective regimen sliding scale   SubCutaneous three times a day before meals  insulin lispro (ADMELOG) corrective regimen sliding scale   SubCutaneous at bedtime  linezolid    Tablet 600 milliGRAM(s) Oral every 12 hours  metoprolol tartrate 25 milliGRAM(s) Oral every 12 hours  pantoprazole    Tablet 40 milliGRAM(s) Oral before breakfast  polyethylene glycol 3350 17 Gram(s) Oral daily  senna 2 Tablet(s) Oral at bedtime  sodium chloride 0.9% lock flush 3 milliLiter(s) IV Push every 8 hours  sodium chloride 0.9%. 1000 milliLiter(s) (50 mL/Hr) IV Continuous <Continuous>  tamsulosin 0.4 milliGRAM(s) Oral at bedtime      Physical Exam  General: Patient comfortable in bed  Vital Signs Last 12 Hrs  T(F): 98.2 (09-18-21 @ 12:51), Max: 98.2 (09-18-21 @ 04:01)  HR: 70 (09-18-21 @ 12:51) (67 - 70)  BP: 105/64 (09-18-21 @ 12:51) (99/64 - 105/64)  BP(mean): --  RR: 18 (09-18-21 @ 12:51) (18 - 18)  SpO2: 99% (09-18-21 @ 12:51) (96% - 99%)  Neck: No palpable thyroid nodules.  CVS: S1S2, No murmurs  Respiratory: No wheezing, no crepitations  GI: Abdomen soft, bowel sounds positive  Musculoskeletal:  edema lower extremities.     Diagnostics

## 2021-09-18 NOTE — PROGRESS NOTE ADULT - PROBLEM SELECTOR PLAN 1
Endocrine following, Dr. Hill  Continue Diabetic Diet  Check FS AC/HS  pt changed to po janumet and glimeperide 2 mg po bid as per endo Endocrine following, Dr. Hill  Continue Diabetic Diet  Check FS AC/HS  continue glimeperide 2 mg po bid as per endo  d/c sweta   pt belligerent at times - refusing to follow medical advice and orders; pt refusing insulin  - endo following

## 2021-09-18 NOTE — PROGRESS NOTE ADULT - PROBLEM SELECTOR PLAN 2
Continue  mg QD, Lopressor 25 mg q12h, and Atorvastatin 80 mg qhs   Continue Amio 200 mg QD   Continue gentle diuresis w/ Lasix 40 mg PO QD & Aldactone 25 mg QD   Increase activity as tolerated, ambulate 4x daily   Encourage incentive spirometry   Wound care and assessment  pt changed to po janumet and glimeperide 2 mg po bid as per endo Continue  mg QD, Lopressor 25 mg q12h, and Atorvastatin 80 mg qhs   Continue Amio 200 mg QD   hold diuretics secondary to ANA at this time   Increase activity as tolerated, ambulate 4x daily   Encourage incentive spirometry   Wound care and assessment  discharge planning- home when stable

## 2021-09-18 NOTE — PROGRESS NOTE ADULT - ASSESSMENT
64 y/o M w/ PMHx of T2DM c/b peripheral neuropathy, HLD, HTN, BPH, and CAD s/p C3L w/ Dr. Valerio 8/27/21 (d/c'd home 9/6) presents to [facility] ED c/o worsening LLE redness, swelling, and pain x 3-4 days. He states that his leg started feeling "hard" and warm today, which prompted him to call the CTS answering service. After d/w Dr. Valerio, decision was made for the pt to come to the ED for further evaluation and likely admission for further workup and management. Additionally, pt reports having an open wound on his L shin that has started to have bloody drainage since he was d/c'd home.     Pt seen and evaluated on arrival to ED - VSS, NAD. Afebrile, no leukocytosis, nontoxic appearing. XR tib/fib showed no cortical erosions or gas tracking. LLE duplex negative for DVT, (+) L greater saphenous vein thrombosis - likely residual as pt had L SVG harvest for CABG, no indication for AC as per Dr. Valerio. BCx x 2 sent in ED - will f/u. D/w Dr. Valerio - will admit pt for further workup and management. Per Dr. Valerio, pt started on Zinacef for ppx - 1st dose given in ED. Will otherwise resume home medication regimen.   9/11 HD stable SR 60's.  Vancomycin added.  Continue zinacef.  Ace wrap LLE.  Neg for DVT  9/12 Premeal/lantus added  Vanco/Zinacef LLE cellulitis>Vanco level after 4th dose  9/13 LLE improved edema, wd with scant serous drainage, Cont abx  9/14 Hyperglycemia, endo consult, increase coverage, Cont iv abx for now. Ace wrap  9/15 VSS,  Insulin requirements increased for hyperglycemia. Continue IV Vanco through 9/17 for 7 day course total. Plan to d/c patient on PO Zyvox x 2 weeks on d/c per Dr. Valerio.  9/16 VSS, FS better controlled 109 - 167, continue on IV Vanco daily and plan to d/c home on po zyvox after 7 day course.   9/17 VSS; wbc 5 and pt afebrile; continue abx as per ID; continue diuresis; pt refusing insulin for at home; pt changed to po janumet and glimeperide 2 mg po bid as per endo  discharge planning- home in am sat if bs stable overnight  64 y/o M w/ PMHx of T2DM c/b peripheral neuropathy, HLD, HTN, BPH, and CAD s/p C3L w/ Dr. Valerio 8/27/21 (d/c'd home 9/6) presents to [facility] ED c/o worsening LLE redness, swelling, and pain x 3-4 days. He states that his leg started feeling "hard" and warm today, which prompted him to call the CTS answering service. After d/w Dr. Valerio, decision was made for the pt to come to the ED for further evaluation and likely admission for further workup and management. Additionally, pt reports having an open wound on his L shin that has started to have bloody drainage since he was d/c'd home.     Pt seen and evaluated on arrival to ED - VSS, NAD. Afebrile, no leukocytosis, nontoxic appearing. XR tib/fib showed no cortical erosions or gas tracking. LLE duplex negative for DVT, (+) L greater saphenous vein thrombosis - likely residual as pt had L SVG harvest for CABG, no indication for AC as per Dr. Valerio. BCx x 2 sent in ED - will f/u. D/w Dr. Valerio - will admit pt for further workup and management. Per Dr. Valerio, pt started on Zinacef for ppx - 1st dose given in ED. Will otherwise resume home medication regimen.   9/11 HD stable SR 60's.  Vancomycin added.  Continue zinacef.  Ace wrap LLE.  Neg for DVT  9/12 Premeal/lantus added  Vanco/Zinacef LLE cellulitis>Vanco level after 4th dose  9/13 LLE improved edema, wd with scant serous drainage, Cont abx  9/14 Hyperglycemia, endo consult, increase coverage, Cont iv abx for now. Ace wrap  9/15 VSS,  Insulin requirements increased for hyperglycemia. Continue IV Vanco through 9/17 for 7 day course total. Plan to d/c patient on PO Zyvox x 2 weeks on d/c per Dr. Valerio.  9/16 VSS, FS better controlled 109 - 167, continue on IV Vanco daily and plan to d/c home on po zyvox after 7 day course.   9/17 VSS; wbc 5 and pt afebrile; continue abx as per ID; continue diuresis; pt refusing insulin for at home; pt changed to po janumet and glimeperide 2 mg po bid as per endo  discharge planning- home in am sat if bs stable overnight   9/18 VSS; RSR 60-80; bs controlled this am; bun/cr increased this am 33/1.5 - repeat bmp 35/1.7; diuretics d/c as well as janumet; PVR bladder scan ordered; d/w RN;   renal consult called with Dr. Fitzpatrick; wbc 6 and pt afebrile; d/c iv abx and change to po zyvox   pt belligerent at times - refusing to follow medical advice and orders; pt refusing insulin  - endo following              64 y/o M w/ PMHx of T2DM c/b peripheral neuropathy, HLD, HTN, BPH, and CAD s/p C3L w/ Dr. Valerio 8/27/21 (d/c'd home 9/6) presents to [facility] ED c/o worsening LLE redness, swelling, and pain x 3-4 days. He states that his leg started feeling "hard" and warm today, which prompted him to call the CTS answering service. After d/w Dr. Valerio, decision was made for the pt to come to the ED for further evaluation and likely admission for further workup and management. Additionally, pt reports having an open wound on his L shin that has started to have bloody drainage since he was d/c'd home.     Pt seen and evaluated on arrival to ED - VSS, NAD. Afebrile, no leukocytosis, nontoxic appearing. XR tib/fib showed no cortical erosions or gas tracking. LLE duplex negative for DVT, (+) L greater saphenous vein thrombosis - likely residual as pt had L SVG harvest for CABG, no indication for AC as per Dr. Valerio. BCx x 2 sent in ED - will f/u. D/w Dr. Valerio - will admit pt for further workup and management. Per Dr. Valerio, pt started on Zinacef for ppx - 1st dose given in ED. Will otherwise resume home medication regimen.   9/11 HD stable SR 60's.  Vancomycin added.  Continue zinacef.  Ace wrap LLE.  Neg for DVT  9/12 Premeal/lantus added  Vanco/Zinacef LLE cellulitis>Vanco level after 4th dose  9/13 LLE improved edema, wd with scant serous drainage, Cont abx  9/14 Hyperglycemia, endo consult, increase coverage, Cont iv abx for now. Ace wrap  9/15 VSS,  Insulin requirements increased for hyperglycemia. Continue IV Vanco through 9/17 for 7 day course total. Plan to d/c patient on PO Zyvox x 2 weeks on d/c per Dr. Valerio.  9/16 VSS, FS better controlled 109 - 167, continue on IV Vanco daily and plan to d/c home on po zyvox after 7 day course.   9/17 VSS; wbc 5 and pt afebrile; continue abx as per ID; continue diuresis; pt refusing insulin for at home; pt changed to po janumet and glimeperide 2 mg po bid as per endo  discharge planning- home in am sat if bs stable overnight   9/18 VSS; RSR 60-80; bs controlled this am; bun/cr increased this am 33/1.5 - repeat bmp 35/1.7; diuretics d/c as well as janumet; PVR bladder scan ordered 60 cc noted;  renal consult called with Dr. Fitzpatrick; wbc 6 and pt afebrile; d/c iv abx and change to po zyvox; ck vanco trough; renal sono; IVF; ck ua and urine lytes   pt belligerent at times - refusing to follow medical advice and orders; pt refusing insulin  - endo following

## 2021-09-19 LAB
ANION GAP SERPL CALC-SCNC: 18 MMOL/L — HIGH (ref 5–17)
BUN SERPL-MCNC: 38 MG/DL — HIGH (ref 7–23)
CALCIUM SERPL-MCNC: 9.2 MG/DL — SIGNIFICANT CHANGE UP (ref 8.4–10.5)
CHLORIDE SERPL-SCNC: 104 MMOL/L — SIGNIFICANT CHANGE UP (ref 96–108)
CO2 SERPL-SCNC: 14 MMOL/L — LOW (ref 22–31)
CREAT SERPL-MCNC: 1.66 MG/DL — HIGH (ref 0.5–1.3)
GLUCOSE SERPL-MCNC: 98 MG/DL — SIGNIFICANT CHANGE UP (ref 70–99)
HCT VFR BLD CALC: 35.4 % — LOW (ref 39–50)
HGB BLD-MCNC: 10.8 G/DL — LOW (ref 13–17)
MCHC RBC-ENTMCNC: 27.9 PG — SIGNIFICANT CHANGE UP (ref 27–34)
MCHC RBC-ENTMCNC: 30.5 GM/DL — LOW (ref 32–36)
MCV RBC AUTO: 91.5 FL — SIGNIFICANT CHANGE UP (ref 80–100)
NRBC # BLD: 0 /100 WBCS — SIGNIFICANT CHANGE UP (ref 0–0)
PLATELET # BLD AUTO: 525 K/UL — HIGH (ref 150–400)
POTASSIUM SERPL-MCNC: 4.6 MMOL/L — SIGNIFICANT CHANGE UP (ref 3.5–5.3)
POTASSIUM SERPL-SCNC: 4.6 MMOL/L — SIGNIFICANT CHANGE UP (ref 3.5–5.3)
RBC # BLD: 3.87 M/UL — LOW (ref 4.2–5.8)
RBC # FLD: 14.9 % — HIGH (ref 10.3–14.5)
SODIUM SERPL-SCNC: 136 MMOL/L — SIGNIFICANT CHANGE UP (ref 135–145)
WBC # BLD: 4.99 K/UL — SIGNIFICANT CHANGE UP (ref 3.8–10.5)
WBC # FLD AUTO: 4.99 K/UL — SIGNIFICANT CHANGE UP (ref 3.8–10.5)

## 2021-09-19 PROCEDURE — 99024 POSTOP FOLLOW-UP VISIT: CPT

## 2021-09-19 RX ORDER — OXYCODONE AND ACETAMINOPHEN 5; 325 MG/1; MG/1
1 TABLET ORAL EVERY 4 HOURS
Refills: 0 | Status: DISCONTINUED | OUTPATIENT
Start: 2021-09-19 | End: 2021-09-20

## 2021-09-19 RX ADMIN — TAMSULOSIN HYDROCHLORIDE 0.4 MILLIGRAM(S): 0.4 CAPSULE ORAL at 21:56

## 2021-09-19 RX ADMIN — HEPARIN SODIUM 5000 UNIT(S): 5000 INJECTION INTRAVENOUS; SUBCUTANEOUS at 14:59

## 2021-09-19 RX ADMIN — LINEZOLID 600 MILLIGRAM(S): 600 INJECTION, SOLUTION INTRAVENOUS at 17:13

## 2021-09-19 RX ADMIN — PANTOPRAZOLE SODIUM 40 MILLIGRAM(S): 20 TABLET, DELAYED RELEASE ORAL at 06:03

## 2021-09-19 RX ADMIN — ATORVASTATIN CALCIUM 80 MILLIGRAM(S): 80 TABLET, FILM COATED ORAL at 21:55

## 2021-09-19 RX ADMIN — Medication 25 MILLIGRAM(S): at 17:14

## 2021-09-19 RX ADMIN — HEPARIN SODIUM 5000 UNIT(S): 5000 INJECTION INTRAVENOUS; SUBCUTANEOUS at 06:04

## 2021-09-19 RX ADMIN — SODIUM CHLORIDE 3 MILLILITER(S): 9 INJECTION INTRAMUSCULAR; INTRAVENOUS; SUBCUTANEOUS at 14:34

## 2021-09-19 RX ADMIN — HEPARIN SODIUM 5000 UNIT(S): 5000 INJECTION INTRAVENOUS; SUBCUTANEOUS at 21:55

## 2021-09-19 RX ADMIN — Medication 325 MILLIGRAM(S): at 12:42

## 2021-09-19 RX ADMIN — FINASTERIDE 5 MILLIGRAM(S): 5 TABLET, FILM COATED ORAL at 12:42

## 2021-09-19 RX ADMIN — SODIUM CHLORIDE 3 MILLILITER(S): 9 INJECTION INTRAMUSCULAR; INTRAVENOUS; SUBCUTANEOUS at 22:39

## 2021-09-19 RX ADMIN — Medication 650 MILLIGRAM(S): at 22:30

## 2021-09-19 RX ADMIN — Medication 600 MILLIGRAM(S): at 17:13

## 2021-09-19 RX ADMIN — Medication 600 MILLIGRAM(S): at 06:03

## 2021-09-19 RX ADMIN — GABAPENTIN 300 MILLIGRAM(S): 400 CAPSULE ORAL at 06:03

## 2021-09-19 RX ADMIN — LINEZOLID 600 MILLIGRAM(S): 600 INJECTION, SOLUTION INTRAVENOUS at 06:03

## 2021-09-19 RX ADMIN — Medication 2 MILLIGRAM(S): at 08:10

## 2021-09-19 RX ADMIN — Medication 650 MILLIGRAM(S): at 21:56

## 2021-09-19 RX ADMIN — SODIUM CHLORIDE 3 MILLILITER(S): 9 INJECTION INTRAMUSCULAR; INTRAVENOUS; SUBCUTANEOUS at 05:00

## 2021-09-19 RX ADMIN — GABAPENTIN 300 MILLIGRAM(S): 400 CAPSULE ORAL at 14:58

## 2021-09-19 RX ADMIN — Medication 2: at 17:13

## 2021-09-19 RX ADMIN — AMIODARONE HYDROCHLORIDE 200 MILLIGRAM(S): 400 TABLET ORAL at 06:03

## 2021-09-19 RX ADMIN — GABAPENTIN 300 MILLIGRAM(S): 400 CAPSULE ORAL at 21:56

## 2021-09-19 RX ADMIN — Medication 25 MILLIGRAM(S): at 06:03

## 2021-09-19 NOTE — PROGRESS NOTE ADULT - ASSESSMENT
Assessment  DMT2: 66y Male with DM T2 with hyperglycemia, was on oral meds at home, now on basal bolus insulin, increased dose yesterday, blood sugars are improving, no hypoglycemic episodes. Patient is eating meals with good appetite, appears comfortable, DC planning in progress, he is adamantly refusing insulin as outpatient.  CAD: s/p CABG, on medications, stable, monitored.  ANA: On antibiotics and diuretics. Unlikely diabetes medication causing ANA, renal team on board.  Wound Infection: On meds, monitored.  HTN: Controlled,  on antihypertensive medications.        Cb Hill MD  Cell: [phone]  Office: [phone]

## 2021-09-19 NOTE — PROGRESS NOTE ADULT - PROBLEM SELECTOR PLAN 1
Patient would benefit from insulin as outpatient, however he is adamantly refusing at this time. Patient was switched oral DM meds, sugars within acceptable range. Will continue insulin coverage for now. Will continue monitoring FS and FU. If blood sugars remain stable, he may be DC on the following PO hypoglycemic regimen:  -Glimepiride 2mg BID with meals  -if creatinine remains elevated may DC home on Januvia 50 mg PO daily, Glimeperide 2mg once or twice daily depending on blood sugars. Patient counseled  to adjust medications depending of FS numbers.  -endo FU 2 weeks  Discussed plan with patient and primary team. Patient counseled for compliance with consistent low carb diet and physical activity as tolerated outpatient.

## 2021-09-19 NOTE — PROGRESS NOTE ADULT - PROBLEM SELECTOR PLAN 3
LLE duplex 9/10: negative for DVT, (+) L greater saphenous vein thrombosis - likely residual as pt had L SVG harvest  9/11 BCx2 NGTD  PO zyvox for 2 weeks as per DR. Valerio   Endo following for glycemic control

## 2021-09-19 NOTE — PROGRESS NOTE ADULT - SUBJECTIVE AND OBJECTIVE BOX
Overnight events noted      VITAL:  T(C): , Max: 36.9 (21 @ 19:44)  T(F): , Max: 98.4 (21 @ 19:44)  HR: 69 (21 @ 12:30)  BP: 97/59 (21 @ 12:30)  BP(mean): 75 (21 @ 05:59)  RR: 18 (21 @ 12:30)  SpO2: 100% (21 @ 12:30)      PHYSICAL EXAM:  Constitutional: NAD, Alert  HEENT: NCAT, MMM  Neck: Supple, No JVD  Respiratory: CTA-b/l  Cardiovascular: RRR s1s2, no m/r/g  Gastrointestinal: BS+, soft, NT/ND  Extremities: No peripheral edema b/l  Neurological: no focal deficits; strength grossly intact  Back: no CVAT b/l  Skin: No rashes, no nevi      LABS:                        10.8   4.99  )-----------( 525      ( 19 Sep 2021 04:50 )             35.4     Na(136)/K(4.6)/Cl(104)/HCO3(14)/BUN(38)/Cr(1.66)Glu(98)/Ca(9.2)/Mg(--)/PO4(--)     @ 04:50  Na(134)/K(4.7)/Cl(98)/HCO3(20)/BUN(35)/Cr(1.70)Glu(194)/Ca(9.9)/Mg(--)/PO4(--)     @ 11:27  Na(133)/K(4.7)/Cl(100)/HCO3(18)/BUN(33)/Cr(1.53)Glu(162)/Ca(9.4)/Mg(--)/PO4(--)     @ 09:56  Na(138)/K(3.8)/Cl(104)/HCO3(21)/BUN(20)/Cr(0.98)Glu(138)/Ca(8.9)/Mg(--)/PO4(--)     @ 05:47    Urinalysis Basic - ( 18 Sep 2021 13:42 )  Color: Light Yellow / Appearance: Clear / S.009 / pH: x  Gluc: x / Ketone: Negative  / Bili: Negative / Urobili: Negative   Blood: x / Protein: Negative / Nitrite: Negative   Leuk Esterase: Negative / RBC: 1 /hpf / WBC 1 /HPF   Sq Epi: x / Non Sq Epi: 1 /hpf / Bacteria: Negative  Chloride, Random Urine: 110 mmol/L ( @ 13:42)  Creatinine, Random Urine: 32 mg/dL ( @ 13:42)  Sodium, Random Urine: 107 mmol/L ( @ 13:42)  Potassium, Random Urine: 26 mmol/L ( @ 13:42)      IMPRESSION: 66M w/ HTN, DM2, and CAD-CABG 21, 21 readmitted with LLE cellulitis; of late c/b ANA    (1)Renal - ANA - urine studies not consistent with prerenal azotemia; also not consistent with AIN. Most likely culprit for the ANA here is Vancomycin. Creatinine appears to have plateaued in the mid-high 1s, corresponding with GFR ~35-45ml/min      RECOMMEND:  (1)No further Vancomycin  (2)No IVF/No diuretics for now  (3)Dose new meds for GFR 35-45ml/min  (4)If for discharge, would repeat BMP in 3-5 days; could f/u at our office in 1-2 weeks      Thank you for involving [facility] in this patient's care.        Rafat Lay MD  [facility]  Office: [phone]  Cell: [phone]       Good urine output; no pain, no sob      VITAL:  T(C): , Max: 36.9 (21 @ 19:44)  T(F): , Max: 98.4 (21 @ 19:44)  HR: 69 (21 @ 12:30)  BP: 97/59 (21 @ 12:30)  BP(mean): 75 (21 @ 05:59)  RR: 18 (21 @ 12:30)  SpO2: 100% (21 @ 12:30)      PHYSICAL EXAM:  Constitutional: NAD, Alert  HEENT: NCAT, MMM  Neck: Supple, No JVD  Respiratory: CTA-b/l  Cardiovascular: RRR s1s2, no m/r/g  Gastrointestinal: BS+, soft, NT/ND  Extremities: No peripheral edema b/l  Neurological: no focal deficits; strength grossly intact  Back: no CVAT b/l  Skin: No rashes, no nevi      LABS:                        10.8   4.99  )-----------( 525      ( 19 Sep 2021 04:50 )             35.4     Na(136)/K(4.6)/Cl(104)/HCO3(14)/BUN(38)/Cr(1.66)Glu(98)/Ca(9.2)/Mg(--)/PO4(--)     @ 04:50  Na(134)/K(4.7)/Cl(98)/HCO3(20)/BUN(35)/Cr(1.70)Glu(194)/Ca(9.9)/Mg(--)/PO4(--)     @ 11:27  Na(133)/K(4.7)/Cl(100)/HCO3(18)/BUN(33)/Cr(1.53)Glu(162)/Ca(9.4)/Mg(--)/PO4(--)     @ 09:56  Na(138)/K(3.8)/Cl(104)/HCO3(21)/BUN(20)/Cr(0.98)Glu(138)/Ca(8.9)/Mg(--)/PO4(--)     @ 05:47    Urinalysis Basic - ( 18 Sep 2021 13:42 )  Color: Light Yellow / Appearance: Clear / S.009 / pH: x  Gluc: x / Ketone: Negative  / Bili: Negative / Urobili: Negative   Blood: x / Protein: Negative / Nitrite: Negative   Leuk Esterase: Negative / RBC: 1 /hpf / WBC 1 /HPF   Sq Epi: x / Non Sq Epi: 1 /hpf / Bacteria: Negative  Chloride, Random Urine: 110 mmol/L ( @ 13:42)  Creatinine, Random Urine: 32 mg/dL ( @ 13:42)  Sodium, Random Urine: 107 mmol/L ( @ 13:42)  Potassium, Random Urine: 26 mmol/L ( @ 13:42)      IMPRESSION: 66M w/ HTN, DM2, and CAD-CABG 21, 21 readmitted with LLE cellulitis; of late c/b ANA    (1)Renal - ANA - urine studies not consistent with prerenal azotemia; also not consistent with AIN. Most likely culprit for the ANA here is Vancomycin. Creatinine appears to have plateaued in the mid-high 1s, corresponding with GFR ~35-45ml/min      RECOMMEND:  (1)No further Vancomycin  (2)No IVF/No diuretics for now  (3)Dose new meds for GFR 35-45ml/min  (4)If for discharge, would repeat BMP in 3-5 days; could f/u at our office in 1-2 weeks      Thank you for involving [facility] in this patient's care.        Rafat Lay MD  [facility]  Office: [phone]  Cell: [phone]

## 2021-09-19 NOTE — PROGRESS NOTE ADULT - SUBJECTIVE AND OBJECTIVE BOX
Chief complaint    Patient is a 66y old  Male who presents with a chief complaint of LLE edema/erythema (19 Sep 2021 13:37)   Review of systems  Patient in bed, appears comfortable.    Labs and Fingersticks  CAPILLARY BLOOD GLUCOSE      POCT Blood Glucose.: 145 mg/dL (19 Sep 2021 12:00)  POCT Blood Glucose.: 77 mg/dL (19 Sep 2021 07:36)  POCT Blood Glucose.: 84 mg/dL (18 Sep 2021 21:29)  POCT Blood Glucose.: 98 mg/dL (18 Sep 2021 16:30)      Anion Gap, Serum: 18 *H* (09-19 @ 04:50)  Anion Gap, Serum: 16 (09-18 @ 11:27)  Anion Gap, Serum: 15 (09-18 @ 09:56)      Calcium, Total Serum: 9.2 (09-19 @ 04:50)  Calcium, Total Serum: 9.9 (09-18 @ 11:27)  Calcium, Total Serum: 9.4 (09-18 @ 09:56)          09-19    136  |  104  |  38<H>  ----------------------------<  98  4.6   |  14<L>  |  1.66<H>    Ca    9.2      19 Sep 2021 04:50                          10.8   4.99  )-----------( 525      ( 19 Sep 2021 04:50 )             35.4     Medications  MEDICATIONS  (STANDING):  aMIOdarone    Tablet 200 milliGRAM(s) Oral daily  aspirin enteric coated 325 milliGRAM(s) Oral daily  atorvastatin 80 milliGRAM(s) Oral at bedtime  bisacodyl 5 milliGRAM(s) Oral at bedtime  dextrose 40% Gel 15 Gram(s) Oral once  dextrose 5%. 1000 milliLiter(s) (50 mL/Hr) IV Continuous <Continuous>  dextrose 5%. 1000 milliLiter(s) (100 mL/Hr) IV Continuous <Continuous>  dextrose 50% Injectable 25 Gram(s) IV Push once  dextrose 50% Injectable 12.5 Gram(s) IV Push once  dextrose 50% Injectable 25 Gram(s) IV Push once  finasteride 5 milliGRAM(s) Oral daily  gabapentin 300 milliGRAM(s) Oral three times a day  gemfibrozil 600 milliGRAM(s) Oral two times a day  glimepiride 2 milliGRAM(s) Oral <User Schedule>  glucagon  Injectable 1 milliGRAM(s) IntraMuscular once  heparin   Injectable 5000 Unit(s) SubCutaneous every 8 hours  insulin lispro (ADMELOG) corrective regimen sliding scale   SubCutaneous three times a day before meals  insulin lispro (ADMELOG) corrective regimen sliding scale   SubCutaneous at bedtime  linezolid    Tablet 600 milliGRAM(s) Oral every 12 hours  metoprolol tartrate 25 milliGRAM(s) Oral every 12 hours  pantoprazole    Tablet 40 milliGRAM(s) Oral before breakfast  polyethylene glycol 3350 17 Gram(s) Oral daily  senna 2 Tablet(s) Oral at bedtime  sodium chloride 0.9% lock flush 3 milliLiter(s) IV Push every 8 hours  sodium chloride 0.9%. 1000 milliLiter(s) (50 mL/Hr) IV Continuous <Continuous>  tamsulosin 0.4 milliGRAM(s) Oral at bedtime      Physical Exam  General: Patient comfortable in bed  Vital Signs Last 12 Hrs  T(F): 98 (09-19-21 @ 12:30), Max: 98.1 (09-19-21 @ 05:01)  HR: 69 (09-19-21 @ 12:30) (69 - 70)  BP: 97/59 (09-19-21 @ 12:30) (92/58 - 99/62)  BP(mean): 75 (09-19-21 @ 05:59) (75 - 75)  RR: 18 (09-19-21 @ 12:30) (18 - 18)  SpO2: 100% (09-19-21 @ 12:30) (96% - 100%)  Neck: No palpable thyroid nodules.  CVS: S1S2, No murmurs  Respiratory: No wheezing, no crepitations  GI: Abdomen soft, bowel sounds positive  Musculoskeletal:  edema lower extremities.     Diagnostics

## 2021-09-19 NOTE — PROGRESS NOTE ADULT - PROBLEM SELECTOR PLAN 1
Endocrine following, Dr. Hill  Continue Diabetic Diet  Check FS AC/HS  continue glimeperide 2 mg po bid as per endo  Possible resume janumet on DC   Pt declining injectable insulin  - endo following 19-Apr-2018 17:03

## 2021-09-19 NOTE — PROGRESS NOTE ADULT - ASSESSMENT
64 y/o M w/ PMHx of T2DM c/b peripheral neuropathy, HLD, HTN, BPH, and CAD s/p C3L w/ Dr. Valerio 8/27/21 (d/c'd home 9/6) presents to [facility] ED c/o worsening LLE redness, swelling, and pain x 3-4 days. He states that his leg started feeling "hard" and warm today, which prompted him to call the CTS answering service. After d/w Dr. Valerio, decision was made for the pt to come to the ED for further evaluation and likely admission for further workup and management. Additionally, pt reports having an open wound on his L shin that has started to have bloody drainage since he was d/c'd home.     Pt seen and evaluated on arrival to ED - VSS, NAD. Afebrile, no leukocytosis, nontoxic appearing. XR tib/fib showed no cortical erosions or gas tracking. LLE duplex negative for DVT, (+) L greater saphenous vein thrombosis - likely residual as pt had L SVG harvest for CABG, no indication for AC as per Dr. Valerio. BCx x 2 sent in ED - will f/u. D/w Dr. Valerio - will admit pt for further workup and management. Per Dr. Valerio, pt started on Zinacef for ppx - 1st dose given in ED. Will otherwise resume home medication regimen.   9/11 HD stable SR 60's.  Vancomycin added.  Continue zinacef.  Ace wrap LLE.  Neg for DVT  9/12 Premeal/lantus added  Vanco/Zinacef LLE cellulitis>Vanco level after 4th dose  9/13 LLE improved edema, wd with scant serous drainage, Cont abx  9/14 Hyperglycemia, endo consult, increase coverage, Cont iv abx for now. Ace wrap  9/15 VSS,  Insulin requirements increased for hyperglycemia. Continue IV Vanco through 9/17 for 7 day course total. Plan to d/c patient on PO Zyvox x 2 weeks on d/c per Dr. Valerio.  9/16 VSS, FS better controlled 109 - 167, continue on IV Vanco daily and plan to d/c home on po zyvox after 7 day course.   9/17 VSS; wbc 5 and pt afebrile; continue abx as per ID; continue diuresis; pt refusing insulin for at home; pt changed to po janumet and glimeperide 2 mg po bid as per endo  discharge planning- home in am sat if bs stable overnight   9/18 VSS; RSR 60-80; bs controlled this am; bun/cr increased this am 33/1.5 - repeat bmp 35/1.7; diuretics d/c as well as janumet; PVR bladder scan ordered 60 cc noted;  renal consult called with Dr. Fitzpatrick; wbc 6 and pt afebrile; d/c iv abx and change to po zyvox; ck vanco trough; renal sono; IVF; ck ua and urine lytes   pt belligerent at times - refusing to follow medical advice and orders; pt refusing insulin  - endo following   9/19 VSS Patient clear by renal for discharge. Ok to DC'd renal US  Will continue on Amaryl 2mg PO BID for now.  Possible resumption of Janumet prior to DC, luna Hill following.

## 2021-09-19 NOTE — PROGRESS NOTE ADULT - SUBJECTIVE AND OBJECTIVE BOX
Subjective: "Im alright "  Patient denies chest pain/shortness of breath. No nausea/vomiting. Patient endorses he would like to go home.     TELEMETRY: NSR 60's-80s    VITAL SIGNS    Vital Signs Last 24 Hrs  T(C): 36.7 (21 @ 12:30), Max: 36.9 (21 @ 19:44)  T(F): 98 (21 @ 12:30), Max: 98.4 (21 @ 19:44)  HR: 69 (21 @ 12:30) (67 - 70)  BP: 97/59 (21 @ 12:30) (92/58 - 101/65)  RR: 18 (21 @ 12:30) (18 - 18)  SpO2: 100% (21 @ 12:30) (96% - 100%)             @ 07:01  -   @ 07:00  --------------------------------------------------------  IN: 2394 mL / OUT: 3000 mL / NET: -606 mL     @ 07:01  -   @ 13:38  --------------------------------------------------------  IN: 0 mL / OUT: 300 mL / NET: -300 mL       Daily     Daily Weight in k.5 (19 Sep 2021 08:48)  Admit Wt: Drug Dosing Weight  Height (cm): 167.6 (11 Sep 2021 08:11)  Weight (kg): 77.1 (10 Sep 2021 22:12)  BMI (kg/m2): 27.4 (11 Sep 2021 08:11)  BSA (m2): 1.87 (11 Sep 2021 08:11)      CAPILLARY BLOOD GLUCOSE      POCT Blood Glucose.: 145 mg/dL (19 Sep 2021 12:00)  POCT Blood Glucose.: 77 mg/dL (19 Sep 2021 07:36)  POCT Blood Glucose.: 84 mg/dL (18 Sep 2021 21:29)  POCT Blood Glucose.: 98 mg/dL (18 Sep 2021 16:30)              PHYSICAL EXAM    Neurology: alert and oriented x 3, nonfocal, no gross deficits  CV: MSI YAYO  Sternal Wound :  MSI YAYO, Stable  Lungs: clear. RR easy, unlabored   Abdomen: soft, nontender, nondistended, positive bowel sounds, +bowel movement    :  Voids without difficulty    Extremities:   ISSA; Left leg vein harvest site c/d/i, healing Trace LE edema, neg calf tenderness.   PPP bilaterally        acetaminophen   Tablet .. 650 milliGRAM(s) Oral every 6 hours PRN  aMIOdarone    Tablet 200 milliGRAM(s) Oral daily  aspirin enteric coated 325 milliGRAM(s) Oral daily  atorvastatin 80 milliGRAM(s) Oral at bedtime  bisacodyl 5 milliGRAM(s) Oral at bedtime  dextrose 40% Gel 15 Gram(s) Oral once  dextrose 5%. 1000 milliLiter(s) IV Continuous <Continuous>  dextrose 5%. 1000 milliLiter(s) IV Continuous <Continuous>  dextrose 50% Injectable 25 Gram(s) IV Push once  dextrose 50% Injectable 12.5 Gram(s) IV Push once  dextrose 50% Injectable 25 Gram(s) IV Push once  finasteride 5 milliGRAM(s) Oral daily  gabapentin 300 milliGRAM(s) Oral three times a day  gemfibrozil 600 milliGRAM(s) Oral two times a day  glimepiride 2 milliGRAM(s) Oral <User Schedule>  glucagon  Injectable 1 milliGRAM(s) IntraMuscular once  heparin   Injectable 5000 Unit(s) SubCutaneous every 8 hours  insulin lispro (ADMELOG) corrective regimen sliding scale   SubCutaneous at bedtime  insulin lispro (ADMELOG) corrective regimen sliding scale   SubCutaneous three times a day before meals  linezolid    Tablet 600 milliGRAM(s) Oral every 12 hours  metoprolol tartrate 25 milliGRAM(s) Oral every 12 hours  pantoprazole    Tablet 40 milliGRAM(s) Oral before breakfast  polyethylene glycol 3350 17 Gram(s) Oral daily  senna 2 Tablet(s) Oral at bedtime  sodium chloride 0.9% lock flush 3 milliLiter(s) IV Push every 8 hours  sodium chloride 0.9%. 1000 milliLiter(s) IV Continuous <Continuous>  tamsulosin 0.4 milliGRAM(s) Oral at bedtime

## 2021-09-19 NOTE — PROGRESS NOTE ADULT - PROBLEM SELECTOR PLAN 5
Clear by renal Dr. Lay for DC as of 9/19/2021  PO Zyvox;   Repeat bmp daily   strict I & O's and daily weight
bun/cr increased this am 33/1.5 - repeat bmp 35/1.7; diuretics d/c as well as janumet;   PVR bladder scan ordered 60 cc noted;    renal consult called with Dr. Fitzpatrick;   d/c iv abx and change to po zyvox;   ck vanco trough;   renal sono;   IVF; .9 NS @ 50 cc/ hr for 1 L total   ck ua and urine lytes   repeat bmp daily   strict I & O's and daily weight

## 2021-09-19 NOTE — PROGRESS NOTE ADULT - PROBLEM SELECTOR PLAN 2
Continue  mg QD, Lopressor 25 mg q12h, and Atorvastatin 80 mg qhs   Continue Amio 200 mg QD   hold diuretics secondary to ANA at this time   Increase activity as tolerated, ambulate 4x daily   Encourage incentive spirometry   Wound care and assessment  discharge planning- home when stable

## 2021-09-20 ENCOUNTER — TRANSCRIPTION ENCOUNTER (OUTPATIENT)
Age: 66
End: 2021-09-20

## 2021-09-20 VITALS
TEMPERATURE: 98 F | HEART RATE: 69 BPM | RESPIRATION RATE: 18 BRPM | DIASTOLIC BLOOD PRESSURE: 61 MMHG | SYSTOLIC BLOOD PRESSURE: 100 MMHG | OXYGEN SATURATION: 99 %

## 2021-09-20 LAB
ANION GAP SERPL CALC-SCNC: 14 MMOL/L — SIGNIFICANT CHANGE UP (ref 5–17)
BASOPHILS # BLD AUTO: 0.09 K/UL — SIGNIFICANT CHANGE UP (ref 0–0.2)
BASOPHILS NFR BLD AUTO: 2 % — SIGNIFICANT CHANGE UP (ref 0–2)
BUN SERPL-MCNC: 26 MG/DL — HIGH (ref 7–23)
CALCIUM SERPL-MCNC: 9.1 MG/DL — SIGNIFICANT CHANGE UP (ref 8.4–10.5)
CHLORIDE SERPL-SCNC: 104 MMOL/L — SIGNIFICANT CHANGE UP (ref 96–108)
CO2 SERPL-SCNC: 18 MMOL/L — LOW (ref 22–31)
CREAT SERPL-MCNC: 1.13 MG/DL — SIGNIFICANT CHANGE UP (ref 0.5–1.3)
EOSINOPHIL # BLD AUTO: 0.42 K/UL — SIGNIFICANT CHANGE UP (ref 0–0.5)
EOSINOPHIL NFR BLD AUTO: 9.3 % — HIGH (ref 0–6)
GLUCOSE SERPL-MCNC: 159 MG/DL — HIGH (ref 70–99)
HCT VFR BLD CALC: 32.2 % — LOW (ref 39–50)
HGB BLD-MCNC: 9.9 G/DL — LOW (ref 13–17)
IMM GRANULOCYTES NFR BLD AUTO: 0.2 % — SIGNIFICANT CHANGE UP (ref 0–1.5)
LYMPHOCYTES # BLD AUTO: 1.21 K/UL — SIGNIFICANT CHANGE UP (ref 1–3.3)
LYMPHOCYTES # BLD AUTO: 26.7 % — SIGNIFICANT CHANGE UP (ref 13–44)
MCHC RBC-ENTMCNC: 28 PG — SIGNIFICANT CHANGE UP (ref 27–34)
MCHC RBC-ENTMCNC: 30.7 GM/DL — LOW (ref 32–36)
MCV RBC AUTO: 91.2 FL — SIGNIFICANT CHANGE UP (ref 80–100)
MONOCYTES # BLD AUTO: 0.61 K/UL — SIGNIFICANT CHANGE UP (ref 0–0.9)
MONOCYTES NFR BLD AUTO: 13.5 % — SIGNIFICANT CHANGE UP (ref 2–14)
NEUTROPHILS # BLD AUTO: 2.19 K/UL — SIGNIFICANT CHANGE UP (ref 1.8–7.4)
NEUTROPHILS NFR BLD AUTO: 48.3 % — SIGNIFICANT CHANGE UP (ref 43–77)
NRBC # BLD: 0 /100 WBCS — SIGNIFICANT CHANGE UP (ref 0–0)
PLATELET # BLD AUTO: 462 K/UL — HIGH (ref 150–400)
POTASSIUM SERPL-MCNC: 4.1 MMOL/L — SIGNIFICANT CHANGE UP (ref 3.5–5.3)
POTASSIUM SERPL-SCNC: 4.1 MMOL/L — SIGNIFICANT CHANGE UP (ref 3.5–5.3)
RBC # BLD: 3.53 M/UL — LOW (ref 4.2–5.8)
RBC # FLD: 14.8 % — HIGH (ref 10.3–14.5)
SODIUM SERPL-SCNC: 136 MMOL/L — SIGNIFICANT CHANGE UP (ref 135–145)
WBC # BLD: 4.53 K/UL — SIGNIFICANT CHANGE UP (ref 3.8–10.5)
WBC # FLD AUTO: 4.53 K/UL — SIGNIFICANT CHANGE UP (ref 3.8–10.5)

## 2021-09-20 PROCEDURE — 80053 COMPREHEN METABOLIC PANEL: CPT

## 2021-09-20 PROCEDURE — 86140 C-REACTIVE PROTEIN: CPT

## 2021-09-20 PROCEDURE — 87635 SARS-COV-2 COVID-19 AMP PRB: CPT

## 2021-09-20 PROCEDURE — 82436 ASSAY OF URINE CHLORIDE: CPT

## 2021-09-20 PROCEDURE — 96374 THER/PROPH/DIAG INJ IV PUSH: CPT

## 2021-09-20 PROCEDURE — 84133 ASSAY OF URINE POTASSIUM: CPT

## 2021-09-20 PROCEDURE — 73590 X-RAY EXAM OF LOWER LEG: CPT

## 2021-09-20 PROCEDURE — 93005 ELECTROCARDIOGRAM TRACING: CPT

## 2021-09-20 PROCEDURE — 87040 BLOOD CULTURE FOR BACTERIA: CPT

## 2021-09-20 PROCEDURE — 99285 EMERGENCY DEPT VISIT HI MDM: CPT

## 2021-09-20 PROCEDURE — 85027 COMPLETE CBC AUTOMATED: CPT

## 2021-09-20 PROCEDURE — 85025 COMPLETE CBC W/AUTO DIFF WBC: CPT

## 2021-09-20 PROCEDURE — 80202 ASSAY OF VANCOMYCIN: CPT

## 2021-09-20 PROCEDURE — 82962 GLUCOSE BLOOD TEST: CPT

## 2021-09-20 PROCEDURE — 81001 URINALYSIS AUTO W/SCOPE: CPT

## 2021-09-20 PROCEDURE — 84105 ASSAY OF URINE PHOSPHORUS: CPT

## 2021-09-20 PROCEDURE — 84300 ASSAY OF URINE SODIUM: CPT

## 2021-09-20 PROCEDURE — 87521 HEPATITIS C PROBE&RVRS TRNSC: CPT

## 2021-09-20 PROCEDURE — 93308 TTE F-UP OR LMTD: CPT

## 2021-09-20 PROCEDURE — 83986 ASSAY PH BODY FLUID NOS: CPT

## 2021-09-20 PROCEDURE — 93971 EXTREMITY STUDY: CPT

## 2021-09-20 PROCEDURE — 93321 DOPPLER ECHO F-UP/LMTD STD: CPT

## 2021-09-20 PROCEDURE — 80048 BASIC METABOLIC PNL TOTAL CA: CPT

## 2021-09-20 PROCEDURE — 82570 ASSAY OF URINE CREATININE: CPT

## 2021-09-20 RX ORDER — GLIMEPIRIDE 1 MG
1 TABLET ORAL
Qty: 0 | Refills: 0 | DISCHARGE
Start: 2021-09-20

## 2021-09-20 RX ORDER — LINEZOLID 600 MG/300ML
1 INJECTION, SOLUTION INTRAVENOUS
Qty: 24 | Refills: 0
Start: 2021-09-20 | End: 2021-10-01

## 2021-09-20 RX ORDER — ERGOCALCIFEROL 1.25 MG/1
1 CAPSULE ORAL
Qty: 1 | Refills: 0
Start: 2021-09-20 | End: 2021-10-19

## 2021-09-20 RX ORDER — SITAGLIPTIN AND METFORMIN HYDROCHLORIDE 500; 50 MG/1; MG/1
1 TABLET, FILM COATED ORAL
Qty: 60 | Refills: 0
Start: 2021-09-20 | End: 2021-10-19

## 2021-09-20 RX ORDER — ATORVASTATIN CALCIUM 80 MG/1
1 TABLET, FILM COATED ORAL
Qty: 30 | Refills: 0
Start: 2021-09-20 | End: 2021-10-19

## 2021-09-20 RX ORDER — GABAPENTIN 400 MG/1
1 CAPSULE ORAL
Qty: 0 | Refills: 0 | DISCHARGE

## 2021-09-20 RX ORDER — FINASTERIDE 5 MG/1
1 TABLET, FILM COATED ORAL
Qty: 0 | Refills: 0 | DISCHARGE

## 2021-09-20 RX ORDER — TAMSULOSIN HYDROCHLORIDE 0.4 MG/1
1 CAPSULE ORAL
Qty: 30 | Refills: 0
Start: 2021-09-20 | End: 2021-10-19

## 2021-09-20 RX ORDER — GEMFIBROZIL 600 MG
1 TABLET ORAL
Qty: 0 | Refills: 0 | DISCHARGE

## 2021-09-20 RX ORDER — GABAPENTIN 400 MG/1
1 CAPSULE ORAL
Qty: 90 | Refills: 0
Start: 2021-09-20 | End: 2021-10-19

## 2021-09-20 RX ORDER — ASPIRIN/CALCIUM CARB/MAGNESIUM 324 MG
1 TABLET ORAL
Qty: 30 | Refills: 0
Start: 2021-09-20 | End: 2021-10-19

## 2021-09-20 RX ORDER — TAMSULOSIN HYDROCHLORIDE 0.4 MG/1
1 CAPSULE ORAL
Qty: 0 | Refills: 0 | DISCHARGE

## 2021-09-20 RX ORDER — AMIODARONE HYDROCHLORIDE 400 MG/1
1 TABLET ORAL
Qty: 30 | Refills: 0
Start: 2021-09-20 | End: 2021-10-19

## 2021-09-20 RX ORDER — METOPROLOL TARTRATE 50 MG
1 TABLET ORAL
Qty: 60 | Refills: 0
Start: 2021-09-20 | End: 2021-10-19

## 2021-09-20 RX ORDER — ERGOCALCIFEROL 1.25 MG/1
1 CAPSULE ORAL
Qty: 0 | Refills: 0 | DISCHARGE

## 2021-09-20 RX ORDER — GEMFIBROZIL 600 MG
1 TABLET ORAL
Qty: 60 | Refills: 0
Start: 2021-09-20 | End: 2021-10-19

## 2021-09-20 RX ORDER — FINASTERIDE 5 MG/1
1 TABLET, FILM COATED ORAL
Qty: 30 | Refills: 0
Start: 2021-09-20 | End: 2021-10-19

## 2021-09-20 RX ADMIN — FINASTERIDE 5 MILLIGRAM(S): 5 TABLET, FILM COATED ORAL at 11:30

## 2021-09-20 RX ADMIN — SODIUM CHLORIDE 3 MILLILITER(S): 9 INJECTION INTRAMUSCULAR; INTRAVENOUS; SUBCUTANEOUS at 05:46

## 2021-09-20 RX ADMIN — OXYCODONE AND ACETAMINOPHEN 1 TABLET(S): 5; 325 TABLET ORAL at 06:19

## 2021-09-20 RX ADMIN — Medication 2 MILLIGRAM(S): at 08:13

## 2021-09-20 RX ADMIN — Medication 600 MILLIGRAM(S): at 05:17

## 2021-09-20 RX ADMIN — GABAPENTIN 300 MILLIGRAM(S): 400 CAPSULE ORAL at 05:17

## 2021-09-20 RX ADMIN — AMIODARONE HYDROCHLORIDE 200 MILLIGRAM(S): 400 TABLET ORAL at 05:17

## 2021-09-20 RX ADMIN — PANTOPRAZOLE SODIUM 40 MILLIGRAM(S): 20 TABLET, DELAYED RELEASE ORAL at 05:16

## 2021-09-20 RX ADMIN — LINEZOLID 600 MILLIGRAM(S): 600 INJECTION, SOLUTION INTRAVENOUS at 05:16

## 2021-09-20 RX ADMIN — HEPARIN SODIUM 5000 UNIT(S): 5000 INJECTION INTRAVENOUS; SUBCUTANEOUS at 05:16

## 2021-09-20 RX ADMIN — OXYCODONE AND ACETAMINOPHEN 1 TABLET(S): 5; 325 TABLET ORAL at 05:28

## 2021-09-20 RX ADMIN — Medication 325 MILLIGRAM(S): at 11:30

## 2021-09-20 RX ADMIN — Medication 25 MILLIGRAM(S): at 05:17

## 2021-09-20 NOTE — PROGRESS NOTE ADULT - PROBLEM SELECTOR PROBLEM 1
DM (diabetes mellitus)

## 2021-09-20 NOTE — DISCHARGE NOTE PROVIDER - NSDCFUADDINST_GEN_ALL_CORE_FT
Resume activity as tolerated  Record and log glucose finger stick values  Resume low cholesterol low sodium diet, consistent carbohydrate diet  Shower daily and wash all incisions with soap and water  Ambulate at least four times daily  Use incentive spirometer every hour during the day  Record daily weight and report changes greater than 3lbs  Please follow up with your cardiologist in 2 weeks  Please follow up with Dr Valerio in 2 weeks  Please follow up with Dr Hill in 2 weeks  Resume activity as tolerated  Record and log glucose finger stick values  Resume low cholesterol low sodium diet, consistent carbohydrate diet  Shower daily and wash all incisions with soap and water  Ambulate at least four times daily  Use incentive spirometer every hour during the day  Record daily weight and report changes greater than 3lbs  Please follow up with your cardiologist in 2 weeks  Please follow up with Dr Valerio in 2 weeks  Please follow up with Dr Hill in 2 weeks   Do not take Janumet unless instructed

## 2021-09-20 NOTE — PROGRESS NOTE ADULT - REASON FOR ADMISSION
LLE edema/erythema
LLE edema/erythema  8/27 C3LIMA
LLE edema/erythema

## 2021-09-20 NOTE — DISCHARGE NOTE PROVIDER - NSDCPNSUBOBJ_GEN_ALL_CORE
VITAL SIGNS    Telemetry:  SR 70's  Vital Signs Last 24 Hrs  T(C): 37 (21 @ 04:40), Max: 37 (21 @ 04:40)  T(F): 98.6 (21 @ 04:40), Max: 98.6 (21 @ 04:40)  HR: 74 (21 @ 04:40) (69 - 74)  BP: 106/58 (21 @ 04:40) (97/59 - 118/72)  RR: 18 (21 @ 04:40) (18 - 18)  SpO2: 99% (21 @ 04:40) (99% - 100%)             @ 07:01  -   @ 07:00  --------------------------------------------------------  IN: 200 mL / OUT: 2200 mL / NET: -2000 mL       Daily     Daily Weight in k (20 Sep 2021 08:38)  Admit Wt: Drug Dosing Weight  Height (cm): 167.6 (11 Sep 2021 08:11)  Weight (kg): 77.1 (10 Sep 2021 22:12)  BMI (kg/m2): 27.4 (11 Sep 2021 08:11)  BSA (m2): 1.87 (11 Sep 2021 08:11)      CAPILLARY BLOOD GLUCOSE      POCT Blood Glucose.: 134 mg/dL (20 Sep 2021 07:45)  POCT Blood Glucose.: 228 mg/dL (19 Sep 2021 21:35)  POCT Blood Glucose.: 247 mg/dL (19 Sep 2021 16:43)  POCT Blood Glucose.: 145 mg/dL (19 Sep 2021 12:00)          MEDICATIONS  acetaminophen   Tablet .. 650 milliGRAM(s) Oral every 6 hours PRN  aMIOdarone    Tablet 200 milliGRAM(s) Oral daily  aspirin enteric coated 325 milliGRAM(s) Oral daily  atorvastatin 80 milliGRAM(s) Oral at bedtime  bisacodyl 5 milliGRAM(s) Oral at bedtime  dextrose 40% Gel 15 Gram(s) Oral once  dextrose 5%. 1000 milliLiter(s) IV Continuous <Continuous>  dextrose 5%. 1000 milliLiter(s) IV Continuous <Continuous>  dextrose 50% Injectable 25 Gram(s) IV Push once  dextrose 50% Injectable 12.5 Gram(s) IV Push once  dextrose 50% Injectable 25 Gram(s) IV Push once  finasteride 5 milliGRAM(s) Oral daily  gabapentin 300 milliGRAM(s) Oral three times a day  gemfibrozil 600 milliGRAM(s) Oral two times a day  glimepiride 2 milliGRAM(s) Oral <User Schedule>  glucagon  Injectable 1 milliGRAM(s) IntraMuscular once  heparin   Injectable 5000 Unit(s) SubCutaneous every 8 hours  insulin lispro (ADMELOG) corrective regimen sliding scale   SubCutaneous three times a day before meals  insulin lispro (ADMELOG) corrective regimen sliding scale   SubCutaneous at bedtime  linezolid    Tablet 600 milliGRAM(s) Oral every 12 hours  metoprolol tartrate 25 milliGRAM(s) Oral every 12 hours  oxycodone    5 mG/acetaminophen 325 mG 1 Tablet(s) Oral every 4 hours PRN  pantoprazole    Tablet 40 milliGRAM(s) Oral before breakfast  polyethylene glycol 3350 17 Gram(s) Oral daily  senna 2 Tablet(s) Oral at bedtime  sodium chloride 0.9% lock flush 3 milliLiter(s) IV Push every 8 hours  sodium chloride 0.9%. 1000 milliLiter(s) IV Continuous <Continuous>  tamsulosin 0.4 milliGRAM(s) Oral at bedtime      >>> <<<  PHYSICAL EXAM  Subjective:  Neurology: alert and oriented x 3, nonfocal, no gross deficits  CV :  Sternal Wound :  CDI , Stable  Lungs:  Abdomen: soft, NT,ND, ( +)BM  :  voiding  Extremities: trace erythema, trace edema    LABS      136  |  104  |  26<H>  ----------------------------<  159<H>  4.1   |  18<L>  |  1.13    Ca    9.1      20 Sep 2021 05:45                                   9.9    4.53  )-----------( 462      ( 20 Sep 2021 05:45 )             32.2                 PAST MEDICAL & SURGICAL HISTORY:  T2DM (type 2 diabetes mellitus)  c/b peripheral neuropathy    HTN (hypertension)    HLD (hyperlipidemia)    BPH (benign prostatic hyperplasia)    H/O shoulder surgery  right    H/O arthroscopy of knee    S/P arthroscopy of right knee

## 2021-09-20 NOTE — PROGRESS NOTE ADULT - ASSESSMENT
66M w/ HTN, DM2, and CAD-CABG 8/27/21, 9/11/21 readmitted with LLE cellulitis; of late c/b ANA    (1)Renal - ANA - urine studies not consistent with prerenal azotemia; also not consistent with AIN. Most likely culprit for the ANA here is Vancomycin. Creatinine improving     RECOMMEND:  (1)Off abx at present   (2)No IVF/No diuretics for now;  No objections to diuretics but the right leg has no edema(trace in the left leg)  (3)Dose new meds for GFR 35-45ml/min  (4)If for discharge, would repeat BMP in 3-5 days; could f/u at our office in 1-2 weeks       [facility]   [facility]   [phone]

## 2021-09-20 NOTE — DISCHARGE NOTE PROVIDER - CARE PROVIDERS DIRECT ADDRESSES
[email or web address]

## 2021-09-20 NOTE — PROGRESS NOTE ADULT - PROBLEM SELECTOR PROBLEM 4
Hypertension
BPH (benign prostatic hyperplasia)
Hypertension
BPH (benign prostatic hyperplasia)
Hypertension
Hypertension
BPH (benign prostatic hyperplasia)
Hypertension
BPH (benign prostatic hyperplasia)
BPH (benign prostatic hyperplasia)
Hypertension

## 2021-09-20 NOTE — PROGRESS NOTE ADULT - SUBJECTIVE AND OBJECTIVE BOX
Chief complaint  Patient is a 66y old  Male who presents with a chief complaint of LLE edema/erythema (20 Sep 2021 08:58)   Review of systems  Patient ambulating in room, well-appearing, no hypoglycemic episodes. Planning DC.    Labs and Fingersticks  CAPILLARY BLOOD GLUCOSE      POCT Blood Glucose.: 134 mg/dL (20 Sep 2021 07:45)  POCT Blood Glucose.: 228 mg/dL (19 Sep 2021 21:35)  POCT Blood Glucose.: 247 mg/dL (19 Sep 2021 16:43)      Anion Gap, Serum: 14 (09-20 @ 05:45)  Anion Gap, Serum: 18 *H* (09-19 @ 04:50)      Calcium, Total Serum: 9.1 (09-20 @ 05:45)  Calcium, Total Serum: 9.2 (09-19 @ 04:50)          09-20    136  |  104  |  26<H>  ----------------------------<  159<H>  4.1   |  18<L>  |  1.13    Ca    9.1      20 Sep 2021 05:45                          9.9    4.53  )-----------( 462      ( 20 Sep 2021 05:45 )             32.2     Medications  MEDICATIONS  (STANDING):  aMIOdarone    Tablet 200 milliGRAM(s) Oral daily  aspirin enteric coated 325 milliGRAM(s) Oral daily  atorvastatin 80 milliGRAM(s) Oral at bedtime  bisacodyl 5 milliGRAM(s) Oral at bedtime  dextrose 40% Gel 15 Gram(s) Oral once  dextrose 5%. 1000 milliLiter(s) (50 mL/Hr) IV Continuous <Continuous>  dextrose 5%. 1000 milliLiter(s) (100 mL/Hr) IV Continuous <Continuous>  dextrose 50% Injectable 12.5 Gram(s) IV Push once  dextrose 50% Injectable 25 Gram(s) IV Push once  dextrose 50% Injectable 25 Gram(s) IV Push once  finasteride 5 milliGRAM(s) Oral daily  gabapentin 300 milliGRAM(s) Oral three times a day  gemfibrozil 600 milliGRAM(s) Oral two times a day  glimepiride 2 milliGRAM(s) Oral <User Schedule>  glucagon  Injectable 1 milliGRAM(s) IntraMuscular once  heparin   Injectable 5000 Unit(s) SubCutaneous every 8 hours  insulin lispro (ADMELOG) corrective regimen sliding scale   SubCutaneous three times a day before meals  insulin lispro (ADMELOG) corrective regimen sliding scale   SubCutaneous at bedtime  linezolid    Tablet 600 milliGRAM(s) Oral every 12 hours  metoprolol tartrate 25 milliGRAM(s) Oral every 12 hours  pantoprazole    Tablet 40 milliGRAM(s) Oral before breakfast  polyethylene glycol 3350 17 Gram(s) Oral daily  senna 2 Tablet(s) Oral at bedtime  sodium chloride 0.9% lock flush 3 milliLiter(s) IV Push every 8 hours  sodium chloride 0.9%. 1000 milliLiter(s) (50 mL/Hr) IV Continuous <Continuous>  tamsulosin 0.4 milliGRAM(s) Oral at bedtime      Physical Exam  General: Patient comfortable in bed  Vital Signs Last 12 Hrs  T(F): 97.5 (09-20-21 @ 11:33), Max: 98.6 (09-20-21 @ 04:40)  HR: 69 (09-20-21 @ 11:33) (69 - 74)  BP: 100/61 (09-20-21 @ 11:33) (100/61 - 106/58)  BP(mean): --  RR: 18 (09-20-21 @ 11:33) (18 - 18)  SpO2: 99% (09-20-21 @ 11:33) (99% - 99%)  Neck: No palpable thyroid nodules.  CVS: S1S2, No murmurs  Respiratory: No wheezing, no crepitations  GI: Abdomen soft, bowel sounds positive  Musculoskeletal:  edema lower extremities.   Skin: No skin rashes, no ecchymosis    Diagnostics

## 2021-09-20 NOTE — PROGRESS NOTE ADULT - ASSESSMENT
Patient is calling to ask can the doctor put an order in for her to get a knee scooter. Patient states she have carpal tunnel in both hands and will not be able to use crutches. Patient can be reached at [phone].     Assessment  DMT2: 66y Male with DM T2 with hyperglycemia, was on oral meds at home, now on Glimepiride and insulin coverage, blood sugars are fluctuating though improving today, no hypoglycemic episodes. Patient is eating meals with good appetite, well-appearing, planning DC home, adamantly refusing insulin as outpatient.  CAD: s/p CABG, on medications, stable, monitored.  ANA: On antibiotics and diuretics. Unlikely diabetes medication causing ANA, renal team on board.  Wound Infection: On meds, monitored.  HTN: Controlled,  on antihypertensive medications.        Cb Hill MD  Cell: [phone]  Office: [phone]

## 2021-09-20 NOTE — DISCHARGE NOTE PROVIDER - PROVIDER TOKENS
PROVIDER:[TOKEN:[[id number]],FOLLOWUP:[Routine]],PROVIDER:[TOKEN:[[id number]],FOLLOWUP:[2 weeks]]

## 2021-09-20 NOTE — DISCHARGE NOTE PROVIDER - NSDCMRMEDTOKEN_GEN_ALL_CORE_FT
acetaminophen 325 mg oral tablet: 2 tab(s) orally every 6 hours, As needed, Temp greater or equal to 38C (100.4F), Mild Pain (1 - 3)  amiodarone 200 mg oral tablet: 1 tab(s) orally once a day  aspirin 325 mg oral delayed release tablet: 1 tab(s) orally once a day  atorvastatin 80 mg oral tablet: 1 tab(s) orally once a day (at bedtime)  Drisdol 1.25 mg (50,000 intl units) oral capsule: 1 cap(s) orally once a month  Flomax 0.4 mg oral capsule: 1 cap(s) orally once a day  gemfibrozil 600 mg oral tablet: 1 tab(s) orally 2 times a day  glimepiride 2 mg oral tablet: 1 tab(s) orally 2 times a day  linezolid 600 mg oral tablet: 1 tab(s) orally every 12 hours  metoprolol tartrate 25 mg oral tablet: 1 tab(s) orally 2 times a day  Neurontin 300 mg oral capsule: 1 cap(s) orally 3 times a day  pantoprazole 40 mg oral delayed release tablet: 1 tab(s) orally once a day (before a meal)  Proscar 5 mg oral tablet: 1 tab(s) orally once a day   acetaminophen 325 mg oral tablet: 2 tab(s) orally every 6 hours, As needed, Temp greater or equal to 38C (100.4F), Mild Pain (1 - 3)  amiodarone 200 mg oral tablet: 1 tab(s) orally once a day  aspirin 325 mg oral delayed release tablet: 1 tab(s) orally once a day  atorvastatin 80 mg oral tablet: 1 tab(s) orally once a day (at bedtime)  Drisdol 1.25 mg (50,000 intl units) oral capsule: 1 cap(s) orally once a month  Flomax 0.4 mg oral capsule: 1 cap(s) orally once a day  gemfibrozil 600 mg oral tablet: 1 tab(s) orally 2 times a day  glimepiride 2 mg oral tablet: 1 tab(s) orally 2 times a day  Janumet 50 mg-1000 mg oral tablet: 1 tab(s) orally 2 times a day     linezolid 600 mg oral tablet: 1 tab(s) orally every 12 hours  metoprolol tartrate 25 mg oral tablet: 1 tab(s) orally 2 times a day  Neurontin 300 mg oral capsule: 1 cap(s) orally 3 times a day  pantoprazole 40 mg oral delayed release tablet: 1 tab(s) orally once a day (before a meal)  Proscar 5 mg oral tablet: 1 tab(s) orally once a day

## 2021-09-20 NOTE — DISCHARGE NOTE PROVIDER - HOSPITAL COURSE
64 y/o M w/ PMHx of T2DM c/b peripheral neuropathy, HLD, HTN, BPH, and CAD s/p C3L w/ Dr. Valerio 8/27/21 (d/c'd home 9/6) presents to [facility] ED c/o worsening LLE redness, swelling, and pain x 3-4 days. He states that his leg started feeling "hard" and warm today, which prompted him to call the CTS answering service. After d/w Dr. Valerio, decision was made for the pt to come to the ED for further evaluation and likely admission for further workup and management. Additionally, pt reports having an open wound on his L shin that has started to have bloody drainage since he was d/c'd home.     Pt seen and evaluated on arrival to ED - VSS, NAD. Afebrile, no leukocytosis, nontoxic appearing. XR tib/fib showed no cortical erosions or gas tracking. LLE duplex negative for DVT, (+) L greater saphenous vein thrombosis - likely residual as pt had L SVG harvest for CABG, no indication for AC as per Dr. Valerio. BCx x 2 sent in ED - will f/u. D/w Dr. Valerio - will admit pt for further workup and management. Per Dr. Valerio, pt started on Zinacef for ppx - 1st dose given in ED. Will otherwise resume home medication regimen.   9/11 HD stable SR 60's.  Vancomycin added.  Continue zinacef.  Ace wrap LLE.  Neg for DVT  9/12 Premeal/lantus added  Vanco/Zinacef LLE cellulitis>Vanco level after 4th dose  9/13 LLE improved edema, wd with scant serous drainage, Cont abx  9/14 Hyperglycemia, endo consult, increase coverage, Cont iv abx for now. Ace wrap  9/15 VSS,  Insulin requirements increased for hyperglycemia. Continue IV Vanco through 9/17 for 7 day course total. Plan to d/c patient on PO Zyvox x 2 weeks on d/c per Dr. Valerio.  9/16 VSS, FS better controlled 109 - 167, continue on IV Vanco daily and plan to d/c home on po zyvox after 7 day course.   9/17 VSS; wbc 5 and pt afebrile; continue abx as per ID; continue diuresis; pt refusing insulin for at home; pt changed to po janumet and glimeperide 2 mg po bid as per endo  discharge planning- home in am sat if bs stable overnight   9/18 VSS; RSR 60-80; bs controlled this am; bun/cr increased this am 33/1.5 - repeat bmp 35/1.7; diuretics d/c as well as janumet; PVR bladder scan ordered 60 cc noted;  renal consult called with Dr. Fitzpatrick; wbc 6 and pt afebrile; d/c iv abx and change to po zyvox; ck vanco trough; renal sono; IVF; ck ua and urine lytes   pt belligerent at times - refusing to follow medical advice and orders; pt refusing insulin  - endo following   9/19 VSS Patient clear by renal for discharge. Ok to DC'd renal US  Will continue on Amaryl 2mg PO BID for now.  Possible resumption of Janumet prior to DC, [place] following.    9/20 Creatinine 1.13. Glucose controlled on present antidiabetic agents. Continue with Zyvoxx to complete 14 day course  Patient would benefit from insulin as outpatient, however he is adamantly refusing at this time. Patient was switched oral DM meds, sugars within acceptable range. Will continue insulin coverage for now. Patient counseled  to adjust medications depending of FS numbers.  Endo FU 2 weeks  Patient counseled for compliance with consistent low carb diet and physical activity as tolerated outpatient 66 y/o M w/ PMHx of T2DM c/b peripheral neuropathy, HLD, HTN, BPH, and CAD s/p C3L w/ Dr. Valerio 8/27/21 (d/c'd home 9/6) presents to [facility] ED c/o worsening LLE redness, swelling, and pain x 3-4 days. He states that his leg started feeling "hard" and warm today, which prompted him to call the CTS answering service. After d/w Dr. Valerio, decision was made for the pt to come to the ED for further evaluation and likely admission for further workup and management. Additionally, pt reports having an open wound on his L shin that has started to have bloody drainage since he was d/c'd home.     Pt seen and evaluated on arrival to ED - VSS, NAD. Afebrile, no leukocytosis, nontoxic appearing. XR tib/fib showed no cortical erosions or gas tracking. LLE duplex negative for DVT, (+) L greater saphenous vein thrombosis - likely residual as pt had L SVG harvest for CABG, no indication for AC as per Dr. Valerio. BCx x 2 sent in ED - will f/u. D/w Dr. Valerio - will admit pt for further workup and management. Per Dr. Valerio, pt started on Zinacef for ppx - 1st dose given in ED. Will otherwise resume home medication regimen.   9/11 HD stable SR 60's.  Vancomycin added.  Continue zinacef.  Ace wrap LLE.  Neg for DVT  9/12 Premeal/lantus added  Vanco/Zinacef LLE cellulitis>Vanco level after 4th dose  9/13 LLE improved edema, wd with scant serous drainage, Cont abx  9/14 Hyperglycemia, endo consult, increase coverage, Cont iv abx for now. Ace wrap  9/15 VSS,  Insulin requirements increased for hyperglycemia. Continue IV Vanco through 9/17 for 7 day course total. Plan to d/c patient on PO Zyvox x 2 weeks on d/c per Dr. Valerio.  9/16 VSS, FS better controlled 109 - 167, continue on IV Vanco daily and plan to d/c home on po zyvox after 7 day course.   9/17 VSS; wbc 5 and pt afebrile; continue abx as per ID; continue diuresis; pt refusing insulin for at home; pt changed to po janumet and glimeperide 2 mg po bid as per endo  discharge planning- home in am sat if bs stable overnight   9/18 VSS; RSR 60-80; bs controlled this am; bun/cr increased this am 33/1.5 - repeat bmp 35/1.7; diuretics d/c as well as janumet; PVR bladder scan ordered 60 cc noted;  renal consult called with Dr. Fitzpatrick; wbc 6 and pt afebrile; d/c iv abx and change to po zyvox; ck vanco trough; renal sono; IVF; ck ua and urine lytes   pt belligerent at times - refusing to follow medical advice and orders; pt refusing insulin  - endo following   9/19 VSS Patient clear by renal for discharge. Ok to DC'd renal US  Will continue on Amaryl 2mg PO BID for now.  Possible resumption of Janumet prior to DC, [place] following.    9/20 Creatinine 1.13. Glucose controlled on present antidiabetic agents. Continue with Zyvoxx to complete 14 day course  Patient would benefit from insulin as outpatient, however he is adamantly refusing at this time. Patient was switched oral DM meds, sugars within acceptable range. Will continue insulin coverage for now. Patient counseled  to adjust medications depending of FS numbers. Pt instructed not to take Janumet  Endo FU 2 weeks  Patient counseled for compliance with consistent low carb diet and physical activity as tolerated outpatient

## 2021-09-20 NOTE — DISCHARGE NOTE PROVIDER - NSDCCPCAREPLAN_GEN_ALL_CORE_FT
PRINCIPAL DISCHARGE DIAGNOSIS  Diagnosis: Cellulitis  Assessment and Plan of Treatment: complete antiobitic course

## 2021-09-20 NOTE — PROGRESS NOTE ADULT - PROBLEM SELECTOR PLAN 1
Patient would benefit from insulin as outpatient, however he is adamantly refusing at this time. Patient was switched to oral DM meds, sugars improving and trending within acceptable range today. Will continue current regimen for now, continue monitoring FS and FU.  If blood sugars and Cr remain stable, he may be DC on the following PO hypoglycemic regimen:  -Glimepiride 2mg BID with meals (or once depending on blood sugars)  -Janumet 50/1000 BID  -Endo FU 2 weeks  Discussed plan with patient and primary team. Patient counseled  to adjust medications depending on FS numbers.  Patient counseled for compliance with consistent low carb diet and physical activity as tolerated outpatient.

## 2021-09-20 NOTE — DISCHARGE NOTE PROVIDER - CARE PROVIDER_API CALL
Deep Valerio)  Surgery; Surgical Critical Care; Thoracic and Cardiac Surgery  [address] [place] [id number]  Phone: [phone]  Fax: [phone]  Follow Up Time: Routine    C[place] [facility])  EndocrinologyMetabDiabetes; Internal Medicine  [address] [place] [address]  Phone: [phone]  Fax: [phone]  Follow Up Time: 2 weeks

## 2021-09-20 NOTE — PROGRESS NOTE ADULT - PROBLEM SELECTOR PROBLEM 2
S/P CABG x 3

## 2021-09-20 NOTE — PROGRESS NOTE ADULT - PROVIDER SPECIALTY LIST ADULT
CT Surgery
Nephrology
Nephrology
CT Surgery
Endocrinology
CT Surgery
Endocrinology
CT Surgery
Endocrinology
Endocrinology
CT Surgery
Endocrinology
Endocrinology

## 2021-09-20 NOTE — PROGRESS NOTE ADULT - SUBJECTIVE AND OBJECTIVE BOX
NEPHROLOGY-[facility] [phone]        Patient seen and examined in bed.  He was in good spirits         MEDICATIONS  (STANDING):  aMIOdarone    Tablet 200 milliGRAM(s) Oral daily  aspirin enteric coated 325 milliGRAM(s) Oral daily  atorvastatin 80 milliGRAM(s) Oral at bedtime  bisacodyl 5 milliGRAM(s) Oral at bedtime  dextrose 40% Gel 15 Gram(s) Oral once  dextrose 5%. 1000 milliLiter(s) (50 mL/Hr) IV Continuous <Continuous>  dextrose 5%. 1000 milliLiter(s) (100 mL/Hr) IV Continuous <Continuous>  dextrose 50% Injectable 12.5 Gram(s) IV Push once  dextrose 50% Injectable 25 Gram(s) IV Push once  dextrose 50% Injectable 25 Gram(s) IV Push once  finasteride 5 milliGRAM(s) Oral daily  gabapentin 300 milliGRAM(s) Oral three times a day  gemfibrozil 600 milliGRAM(s) Oral two times a day  glimepiride 2 milliGRAM(s) Oral <User Schedule>  glucagon  Injectable 1 milliGRAM(s) IntraMuscular once  heparin   Injectable 5000 Unit(s) SubCutaneous every 8 hours  insulin lispro (ADMELOG) corrective regimen sliding scale   SubCutaneous three times a day before meals  insulin lispro (ADMELOG) corrective regimen sliding scale   SubCutaneous at bedtime  linezolid    Tablet 600 milliGRAM(s) Oral every 12 hours  metoprolol tartrate 25 milliGRAM(s) Oral every 12 hours  pantoprazole    Tablet 40 milliGRAM(s) Oral before breakfast  polyethylene glycol 3350 17 Gram(s) Oral daily  senna 2 Tablet(s) Oral at bedtime  sodium chloride 0.9% lock flush 3 milliLiter(s) IV Push every 8 hours  sodium chloride 0.9%. 1000 milliLiter(s) (50 mL/Hr) IV Continuous <Continuous>  tamsulosin 0.4 milliGRAM(s) Oral at bedtime      VITAL:  T(C): , Max: 37 (21 @ 04:40)  T(F): , Max: 98.6 (21 @ 04:40)  HR: 69 (21 @ 11:33)  BP: 100/61 (21 @ 11:33)  BP(mean): --  RR: 18 (21 @ 11:33)  SpO2: 99% (21 @ 11:33)  Wt(kg): --    I and O's:     @ 07:01  -   @ 07:00  --------------------------------------------------------  IN: 200 mL / OUT: 2200 mL / NET: -2000 mL     @ 07:01  -   @ 13:21  --------------------------------------------------------  IN: 560 mL / OUT: 500 mL / NET: 60 mL          PHYSICAL EXAM:    Constitutional: NAD  Neck:  No JVD  Respiratory: CTAB/L  Cardiovascular: S1 and S2  Gastrointestinal: BS+, soft, NT/ND  Extremities: No peripheral edema  Neurological: A/O x 3, no focal deficits  Psychiatric: Normal mood, normal affect  : No Peters  Skin: No rashes  Access: Not applicable    LABS:                        9.9    4.53  )-----------( 462      ( 20 Sep 2021 05:45 )             32.2         136  |  104  |  26<H>  ----------------------------<  159<H>  4.1   |  18<L>  |  1.13    Ca    9.1      20 Sep 2021 05:45            Urine Studies:  Urinalysis Basic - ( 18 Sep 2021 13:42 )    Color: Light Yellow / Appearance: Clear / S.009 / pH: x  Gluc: x / Ketone: Negative  / Bili: Negative / Urobili: Negative   Blood: x / Protein: Negative / Nitrite: Negative   Leuk Esterase: Negative / RBC: 1 /hpf / WBC 1 /HPF   Sq Epi: x / Non Sq Epi: 1 /hpf / Bacteria: Negative      Chloride, Random Urine: 110 mmol/L ( @ 13:42)  Creatinine, Random Urine: 32 mg/dL ( @ 13:42)  Sodium, Random Urine: 107 mmol/L ( @ 13:42)  Potassium, Random Urine: 26 mmol/L ( @ 13:42)        RADIOLOGY & ADDITIONAL STUDIES:

## 2021-09-20 NOTE — PROGRESS NOTE ADULT - PROBLEM SELECTOR PROBLEM 3
Wound infection

## 2021-09-21 RX ORDER — GLIMEPIRIDE 1 MG
1 TABLET ORAL
Qty: 30 | Refills: 0
Start: 2021-09-21 | End: 2021-10-20

## 2021-09-28 ENCOUNTER — APPOINTMENT (OUTPATIENT)
Dept: CARDIOTHORACIC SURGERY | Facility: CLINIC | Age: 66
End: 2021-09-28
Payer: COMMERCIAL

## 2021-09-28 VITALS
RESPIRATION RATE: 14 BRPM | SYSTOLIC BLOOD PRESSURE: 137 MMHG | OXYGEN SATURATION: 100 % | TEMPERATURE: 98 F | WEIGHT: 194 LBS | HEART RATE: 89 BPM | DIASTOLIC BLOOD PRESSURE: 79 MMHG

## 2021-09-28 DIAGNOSIS — J02.9 ACUTE PHARYNGITIS, UNSPECIFIED: ICD-10-CM

## 2021-09-28 DIAGNOSIS — E78.5 HYPERLIPIDEMIA, UNSPECIFIED: ICD-10-CM

## 2021-09-28 PROCEDURE — 99024 POSTOP FOLLOW-UP VISIT: CPT

## 2021-09-28 RX ORDER — GEMFIBROZIL 600 MG/1
600 TABLET, FILM COATED ORAL TWICE DAILY
Qty: 240 | Refills: 0 | Status: ACTIVE | COMMUNITY
Start: 2021-09-07 | End: 1900-01-01

## 2021-09-28 RX ORDER — BENZOCAINE/MENTH/CETYLPYRD CL 15 MG-4 MG
15-4 LOZENGE MUCOUS MEMBRANE EVERY 6 HOURS
Qty: 1 | Refills: 0 | Status: ACTIVE | COMMUNITY
Start: 2021-09-28 | End: 1900-01-01

## 2021-09-28 RX ORDER — POLYETHYLENE GLYCOL 3350 17 G/17G
17 POWDER, FOR SOLUTION ORAL
Refills: 0 | Status: COMPLETED | COMMUNITY
Start: 2021-09-07 | End: 2021-09-28

## 2021-09-28 RX ORDER — AMIODARONE HYDROCHLORIDE 200 MG/1
200 TABLET ORAL DAILY
Qty: 60 | Refills: 0 | Status: ACTIVE | COMMUNITY
Start: 2021-09-07 | End: 1900-01-01

## 2021-09-28 RX ORDER — ATORVASTATIN CALCIUM 80 MG/1
80 TABLET, FILM COATED ORAL DAILY
Qty: 90 | Refills: 3 | Status: ACTIVE | COMMUNITY
Start: 2021-09-07 | End: 1900-01-01

## 2021-09-28 RX ORDER — FINASTERIDE 5 MG/1
5 TABLET, FILM COATED ORAL DAILY
Qty: 90 | Refills: 0 | Status: ACTIVE | COMMUNITY
Start: 2021-09-07 | End: 1900-01-01

## 2021-09-28 RX ORDER — FUROSEMIDE 40 MG/1
40 TABLET ORAL DAILY
Refills: 0 | Status: COMPLETED | COMMUNITY
Start: 2021-09-07 | End: 2021-09-28

## 2021-09-28 RX ORDER — METOPROLOL TARTRATE 25 MG/1
25 TABLET, FILM COATED ORAL TWICE DAILY
Qty: 240 | Refills: 0 | Status: ACTIVE | COMMUNITY
Start: 2021-09-07 | End: 1900-01-01

## 2021-09-28 RX ORDER — NYSTATIN 100000 [USP'U]/ML
100000 SUSPENSION ORAL
Qty: 1 | Refills: 0 | Status: ACTIVE | COMMUNITY
Start: 2021-09-28 | End: 1900-01-01

## 2021-09-28 RX ORDER — GLIMEPIRIDE 2 MG/1
2 TABLET ORAL
Qty: 240 | Refills: 0 | Status: ACTIVE | COMMUNITY
Start: 2021-09-07 | End: 1900-01-01

## 2021-09-28 RX ORDER — TAMSULOSIN HYDROCHLORIDE 0.4 MG/1
0.4 CAPSULE ORAL
Qty: 90 | Refills: 0 | Status: ACTIVE | COMMUNITY
Start: 2021-09-28 | End: 1900-01-01

## 2021-10-05 ENCOUNTER — TRANSCRIPTION ENCOUNTER (OUTPATIENT)
Age: 66
End: 2021-10-05

## 2021-10-22 ENCOUNTER — APPOINTMENT (OUTPATIENT)
Dept: CARDIOTHORACIC SURGERY | Facility: CLINIC | Age: 66
End: 2021-10-22

## 2021-10-22 DIAGNOSIS — Z95.1 PRESENCE OF AORTOCORONARY BYPASS GRAFT: ICD-10-CM

## 2021-11-26 ENCOUNTER — APPOINTMENT (OUTPATIENT)
Dept: CARDIOTHORACIC SURGERY | Facility: CLINIC | Age: 66
End: 2021-11-26

## 2022-01-01 NOTE — PHYSICAL THERAPY INITIAL EVALUATION ADULT - BED MOBILITY TRAINING, PT EVAL
(2) spontaneous and intermittent (24 hrs old)
GOAL: Pt will perform all bed mobility independently to prevent skin breakdown in 2 weeks.

## 2022-03-15 NOTE — DISCHARGE NOTE PROVIDER - NPI NUMBER (FOR SYSADMIN USE ONLY) :
Thanks I put orders in  [[id number]] [[id number]],[[id number]],[UNKNOWN]

## 2023-09-15 NOTE — PHYSICAL THERAPY INITIAL EVALUATION ADULT - SIT-TO-STAND BALANCE
good balance 168.4 Closure 2 Information: This tab is for additional flaps and grafts, including complex repair and grafts and complex repair and flaps. You can also specify a different location for the additional defect, if the location is the same you do not need to select a new one. We will insert the automated text for the repair you select below just as we do for solitary flaps and grafts. Please note that at this time if you select a location with a different insurance zone you will need to override the ICD10 and CPT if appropriate.

## 2024-02-23 NOTE — CONSULT NOTE ADULT - PROBLEM SELECTOR RECOMMENDATION 2
Suggest to continue medications, monitoring, FU primary team recommendations.
[[id number]]
Start Statin Lipitor 40 mg PO HS   Continue with gemfibrozil 600 mg PO BID

## 2024-08-12 NOTE — ED ADULT NURSE NOTE - TEMPLATE LIST FOR HEAD TO TOE ASSESSMENT
[Alert] : alert [Well Nourished] : well nourished [No Acute Distress] : no acute distress [Well Developed] : well developed [Normal Sclera/Conjunctiva] : normal sclera/conjunctiva [EOMI] : extra ocular movement intact [No Proptosis] : no proptosis [Normal Oropharynx] : the oropharynx was normal [Thyroid Not Enlarged] : the thyroid was not enlarged [No Thyroid Nodules] : no palpable thyroid nodules [No Respiratory Distress] : no respiratory distress [No Accessory Muscle Use] : no accessory muscle use [Clear to Auscultation] : lungs were clear to auscultation bilaterally [Normal S1, S2] : normal S1 and S2 [Normal Rate] : heart rate was normal [Regular Rhythm] : with a regular rhythm [No Edema] : no peripheral edema [Pedal Pulses Normal] : the pedal pulses are present [Normal Bowel Sounds] : normal bowel sounds [Not Tender] : non-tender [Not Distended] : not distended [Soft] : abdomen soft [Normal Anterior Cervical Nodes] : no anterior cervical lymphadenopathy [Normal Posterior Cervical Nodes] : no posterior cervical lymphadenopathy [No Spinal Tenderness] : no spinal tenderness [Spine Straight] : spine straight [No Stigmata of Cushings Syndrome] : no stigmata of Cushings Syndrome [Normal Gait] : normal gait [Normal Strength/Tone] : muscle strength and tone were normal [No Rash] : no rash [Acanthosis Nigricans] : no acanthosis nigricans [Normal Reflexes] : deep tendon reflexes were 2+ and symmetric [No Tremors] : no tremors [Oriented x3] : oriented to person, place, and time [de-identified] : Patches of decrease hair bilaterally on the lateral frontal scalp, baby hairs seen growing in the hairline General